# Patient Record
Sex: MALE | Race: WHITE | Employment: FULL TIME | URBAN - METROPOLITAN AREA
[De-identification: names, ages, dates, MRNs, and addresses within clinical notes are randomized per-mention and may not be internally consistent; named-entity substitution may affect disease eponyms.]

---

## 2017-07-20 ENCOUNTER — RECORDS - HEALTHEAST (OUTPATIENT)
Dept: LAB | Facility: CLINIC | Age: 57
End: 2017-07-20

## 2017-07-20 LAB
CHOLEST SERPL-MCNC: 205 MG/DL
FASTING STATUS PATIENT QL REPORTED: ABNORMAL
HDLC SERPL-MCNC: 45 MG/DL
LDLC SERPL CALC-MCNC: 147 MG/DL
PSA SERPL-MCNC: 0.8 NG/ML (ref 0–3.5)
TRIGL SERPL-MCNC: 64 MG/DL

## 2018-01-30 ENCOUNTER — OFFICE VISIT (OUTPATIENT)
Dept: FAMILY MEDICINE | Facility: CLINIC | Age: 58
End: 2018-01-30
Payer: COMMERCIAL

## 2018-01-30 VITALS
OXYGEN SATURATION: 98 % | SYSTOLIC BLOOD PRESSURE: 138 MMHG | HEIGHT: 72 IN | DIASTOLIC BLOOD PRESSURE: 99 MMHG | WEIGHT: 249.5 LBS | BODY MASS INDEX: 33.79 KG/M2 | TEMPERATURE: 98.2 F | HEART RATE: 76 BPM

## 2018-01-30 DIAGNOSIS — J20.9 ACUTE BRONCHITIS WITH SYMPTOMS > 10 DAYS: Primary | ICD-10-CM

## 2018-01-30 PROCEDURE — 99213 OFFICE O/P EST LOW 20 MIN: CPT | Performed by: FAMILY MEDICINE

## 2018-01-30 RX ORDER — CODEINE PHOSPHATE AND GUAIFENESIN 10; 100 MG/5ML; MG/5ML
1 SOLUTION ORAL EVERY 4 HOURS PRN
Qty: 120 ML | Refills: 0 | Status: SHIPPED | OUTPATIENT
Start: 2018-01-30 | End: 2019-09-25

## 2018-01-30 RX ORDER — BENZONATATE 200 MG/1
200 CAPSULE ORAL 3 TIMES DAILY PRN
Qty: 21 CAPSULE | Refills: 0 | Status: SHIPPED | OUTPATIENT
Start: 2018-01-30 | End: 2019-09-25

## 2018-01-30 NOTE — MR AVS SNAPSHOT
"              After Visit Summary   2018    Lance Hogue    MRN: [id number]           Patient Information     Date Of Birth          1960        Visit Information        Provider Department      2018 3:30 PM Aline De Guzman MD [facility]        Today's Diagnoses     Acute bronchitis with symptoms > 10 days    -  1       Follow-ups after your visit        Who to contact     Normal or non-critical lab and imaging results will be communicated to you by MyChart, letter or phone within 4 business days after the clinic has received the results. If you do not hear from us within 7 days, please contact the clinic through MyChart or phone. If you have a critical or abnormal lab result, we will notify you by phone as soon as possible.  Submit refill requests through [employer] or call your pharmacy and they will forward the refill request to us. Please allow 3 business days for your refill to be completed.          If you need to speak with a  for additional information , please call: [phone]             Additional Information About Your Visit        [employer] Information     [employer] lets you send messages to your doctor, view your test results, renew your prescriptions, schedule appointments and more. To sign up, go to [email or web address] . Click on \"Log in\" on the left side of the screen, which will take you to the Welcome page. Then click on \"Sign up Now\" on the right side of the page.     You will be asked to enter the access code listed below, as well as some personal information. Please follow the directions to create your username and password.     Your access code is: [email or web address]  Expires: 2018  4:06 PM     Your access code will  in 90 days. If you need help or a new code, please call your [place] clinic or [phone].        Care EveryWhere ID     This is your Care EveryWhere ID. This could be used by other organizations to access your [facility] " "medical records  [id number]        Your Vitals Were     Pulse Temperature Height Pulse Oximetry BMI (Body Mass Index)       76 98.2  F (36.8  C) (Tympanic) 5' 11.5\" (1.816 m) 98% 34.31 kg/m2        Blood Pressure from Last 3 Encounters:   01/30/18 (!) 138/99    Weight from Last 3 Encounters:   01/30/18 249 lb 8 oz (113.2 kg)              Today, you had the following     No orders found for display         Today's Medication Changes          These changes are accurate as of 1/30/18  4:06 PM.  If you have any questions, ask your nurse or doctor.               Start taking these medicines.        Dose/Directions    benzonatate 200 MG capsule   Commonly known as:  TESSALON   Used for:  Acute bronchitis with symptoms > 10 days   Started by:  Aline De Guzman MD        Dose:  200 mg   Take 1 capsule (200 mg) by mouth 3 times daily as needed for cough   Quantity:  21 capsule   Refills:  0       guaiFENesin-codeine 100-10 MG/5ML Soln solution   Commonly known as:  ROBITUSSIN AC   Used for:  Acute bronchitis with symptoms > 10 days   Started by:  Aline De Guzman MD        Dose:  1 tsp.   Take 5 mLs by mouth every 4 hours as needed for cough   Quantity:  120 mL   Refills:  0            Where to get your medicines      These medications were sent to [facility] [address] [address] [facility]  [address] [facility] [address]     Phone:  [phone]     benzonatate 200 MG capsule         Some of these will need a paper prescription and others can be bought over the counter.  Ask your nurse if you have questions.     Bring a paper prescription for each of these medications     guaiFENesin-codeine 100-10 MG/5ML Soln solution                Primary Care Provider    None Specified       No primary provider on file.        Equal Access to Services     JESSICA MATHEWS AH: Joshua Figueroa, ajay wang, erwin kaaldayana cash, loy shirley. So wa " [phone].    ATENCIÓN: Si alex velazquez, tiene a hardin disposición servicios gratuitos de asistencia lingüística. Jarad valera [phone].    We comply with applicable federal civil rights laws and [place] laws. We do not discriminate on the basis of race, color, national origin, age, disability, sex, sexual orientation, or gender identity.            Thank you!     Thank you for choosing [facility]  for your care. Our goal is always to provide you with excellent care. Hearing back from our patients is one way we can continue to improve our services. Please take a few minutes to complete the written survey that you may receive in the mail after your visit with us. Thank you!             Your Updated Medication List - Protect others around you: Learn how to safely use, store and throw away your medicines at www.disposemymeds.org.          This list is accurate as of 1/30/18  4:06 PM.  Always use your most recent med list.                   Brand Name Dispense Instructions for use Diagnosis    ATORVASTATIN CALCIUM PO           benzonatate 200 MG capsule    TESSALON    21 capsule    Take 1 capsule (200 mg) by mouth 3 times daily as needed for cough    Acute bronchitis with symptoms > 10 days       guaiFENesin-codeine 100-10 MG/5ML Soln solution    ROBITUSSIN AC    120 mL    Take 5 mLs by mouth every 4 hours as needed for cough    Acute bronchitis with symptoms > 10 days

## 2018-01-30 NOTE — PROGRESS NOTES
"  SUBJECTIVE:   Lance Hogue is a 57 year old male who presents to clinic today for the following health issues:      ENT Symptoms             Symptoms: cc Present Absent Comment   Fever/Chills   x    Fatigue   x    Muscle Aches   x    Eye Irritation   x    Sneezing   x    Nasal Jett/Drg   x    Sinus Pressure/Pain   x    Loss of smell   x    Dental pain   x    Sore Throat   x    Swollen Glands   x    Ear Pain/Fullness   x    Cough  x     Wheeze   x    Chest Pain  x  Chest congestion    Shortness of breath   x    Rash   x    Other   x      Symptom duration:  past 3 weeks    Symptom severity:  moderate    Treatments tried:  OTC cough medication and cough drops    Contacts:  none     Cough started 3 weeks ago, typical cold symptoms  No wheezing or shortness of breath  Says symptoms are getting better but his wife wanted him to be evaluated.     No cp   No f/c     Review of systems:  No n/v   No d/c   No rash     Problem list and histories reviewed & adjusted, as indicated.  Additional history: as documented    There is no problem list on file for this patient.    Current Outpatient Prescriptions   Medication     ATORVASTATIN CALCIUM PO     No current facility-administered medications for this visit.         Allergies   Allergen Reactions     Chocolate Diarrhea and Cramps     BP (!) 138/99 (BP Location: Right arm, Patient Position: Sitting, Cuff Size: Adult Large)  Pulse 76  Temp 98.2  F (36.8  C) (Tympanic)  Ht 5' 11.5\" (1.816 m)  Wt 249 lb 8 oz (113.2 kg)  SpO2 98%  BMI 34.31 kg/m2   BP Readings from Last 3 Encounters:   01/30/18 (!) 138/99       GENERAL - Pt is alert and oriented in no acute distress.  Affect is appropriate. Good eye contact.  HEET - Head is normocephalic, atraumatic.    PERRLA,EEMI. Conjunctiva are free of icterus or erythema.    TMs bilaterally normal.  Oropharynx free of masses and lesions, no tonsillar exudate or petechiae.    NECK - Neck is supple w/o LA or thyromegaly  RESPIRATORY - " Clear to auscultation bilaterally.  No wheezing noted  CV - RRR, no murmurs, rubs, gallops.     Assessment/Plan -  (J20.9) Acute bronchitis with symptoms > 10 days  (primary encounter diagnosis)  Comment: symptoms are improving overall and vitals and exam are normal. He is comfortable continuing to monitor the cough and will treat symptomatically with prn perles during the day and prn cough syrup at night. The patient indicates understanding of these issues and agrees with the plan. Return to clinic  If symptoms persist/change/worsen.   Plan: benzonatate (TESSALON) 200 MG capsule,         guaiFENesin-codeine (ROBITUSSIN AC) 100-10         MG/5ML SOLN solution            SANIA De Gumzan MD

## 2018-01-30 NOTE — NURSING NOTE
"Chief Complaint   Patient presents with     Cough       Initial BP (!) 138/99 (BP Location: Right arm, Patient Position: Sitting, Cuff Size: Adult Large)  Pulse 76  Temp 98.2  F (36.8  C) (Tympanic)  Ht 5' 11.5\" (1.816 m)  Wt 249 lb 8 oz (113.2 kg)  SpO2 98%  BMI 34.31 kg/m2 Estimated body mass index is 34.31 kg/(m^2) as calculated from the following:    Height as of this encounter: 5' 11.5\" (1.816 m).    Weight as of this encounter: 249 lb 8 oz (113.2 kg).  Medication Reconciliation: complete   Mdady Moreno LPN    "

## 2018-05-17 ENCOUNTER — TELEPHONE (OUTPATIENT)
Dept: OTHER | Facility: CLINIC | Age: 58
End: 2018-05-17

## 2018-05-17 NOTE — TELEPHONE ENCOUNTER
5/17/2018    Call Regarding Onboarding [employer] CHOICES    Attempt 1    Message on voicemail     Comments:           Outreach   AT

## 2018-06-08 NOTE — TELEPHONE ENCOUNTER
06/08/2018    Call Regarding Onboarding [employer] choices     Attempt 2    Message on voicemail    Comments:       Outreach   Nevaeh Renteria

## 2018-08-30 ENCOUNTER — RECORDS - HEALTHEAST (OUTPATIENT)
Dept: LAB | Facility: CLINIC | Age: 58
End: 2018-08-30

## 2018-08-30 LAB
CHOLEST SERPL-MCNC: 190 MG/DL
FASTING STATUS PATIENT QL REPORTED: ABNORMAL
HDLC SERPL-MCNC: 44 MG/DL
LDLC SERPL CALC-MCNC: 133 MG/DL
PSA SERPL-MCNC: 0.8 NG/ML (ref 0–3.5)
TRIGL SERPL-MCNC: 66 MG/DL

## 2019-09-25 ENCOUNTER — OFFICE VISIT (OUTPATIENT)
Dept: FAMILY MEDICINE | Facility: CLINIC | Age: 59
End: 2019-09-25
Payer: COMMERCIAL

## 2019-09-25 VITALS
HEART RATE: 64 BPM | WEIGHT: 243.9 LBS | TEMPERATURE: 97.5 F | BODY MASS INDEX: 33.03 KG/M2 | DIASTOLIC BLOOD PRESSURE: 86 MMHG | SYSTOLIC BLOOD PRESSURE: 136 MMHG | RESPIRATION RATE: 16 BRPM | HEIGHT: 72 IN

## 2019-09-25 DIAGNOSIS — N52.03 COMBINED ARTERIAL INSUFFICIENCY AND CORPORO-VENOUS OCCLUSIVE ERECTILE DYSFUNCTION: ICD-10-CM

## 2019-09-25 DIAGNOSIS — Z11.4 SCREENING FOR HIV (HUMAN IMMUNODEFICIENCY VIRUS): Primary | ICD-10-CM

## 2019-09-25 DIAGNOSIS — Z00.00 ROUTINE GENERAL MEDICAL EXAMINATION AT A HEALTH CARE FACILITY: ICD-10-CM

## 2019-09-25 DIAGNOSIS — Z00.01 ENCOUNTER FOR ROUTINE ADULT MEDICAL EXAM WITH ABNORMAL FINDINGS: ICD-10-CM

## 2019-09-25 LAB
BASOPHILS # BLD AUTO: 0 10E9/L (ref 0–0.2)
BASOPHILS NFR BLD AUTO: 0.3 %
DIFFERENTIAL METHOD BLD: ABNORMAL
EOSINOPHIL # BLD AUTO: 0.1 10E9/L (ref 0–0.7)
EOSINOPHIL NFR BLD AUTO: 1.6 %
ERYTHROCYTE [DISTWIDTH] IN BLOOD BY AUTOMATED COUNT: 12.2 % (ref 10–15)
GLUCOSE SERPL-MCNC: 89 MG/DL (ref 70–99)
HCT VFR BLD AUTO: 44.9 % (ref 40–53)
HGB BLD-MCNC: 15.1 G/DL (ref 13.3–17.7)
LYMPHOCYTES # BLD AUTO: 1.5 10E9/L (ref 0.8–5.3)
LYMPHOCYTES NFR BLD AUTO: 23.6 %
MCH RBC QN AUTO: 33.5 PG (ref 26.5–33)
MCHC RBC AUTO-ENTMCNC: 33.6 G/DL (ref 31.5–36.5)
MCV RBC AUTO: 100 FL (ref 78–100)
MONOCYTES # BLD AUTO: 0.7 10E9/L (ref 0–1.3)
MONOCYTES NFR BLD AUTO: 10.5 %
NEUTROPHILS # BLD AUTO: 4.1 10E9/L (ref 1.6–8.3)
NEUTROPHILS NFR BLD AUTO: 64 %
PLATELET # BLD AUTO: 162 10E9/L (ref 150–450)
RBC # BLD AUTO: 4.51 10E12/L (ref 4.4–5.9)
WBC # BLD AUTO: 6.4 10E9/L (ref 4–11)

## 2019-09-25 PROCEDURE — 36415 COLL VENOUS BLD VENIPUNCTURE: CPT | Performed by: FAMILY MEDICINE

## 2019-09-25 PROCEDURE — 85025 COMPLETE CBC W/AUTO DIFF WBC: CPT | Performed by: FAMILY MEDICINE

## 2019-09-25 PROCEDURE — 99396 PREV VISIT EST AGE 40-64: CPT | Performed by: FAMILY MEDICINE

## 2019-09-25 PROCEDURE — 82947 ASSAY GLUCOSE BLOOD QUANT: CPT | Performed by: FAMILY MEDICINE

## 2019-09-25 RX ORDER — ATORVASTATIN CALCIUM 40 MG/1
40 TABLET, FILM COATED ORAL DAILY
Qty: 90 TABLET | Refills: 3 | Status: SHIPPED | OUTPATIENT
Start: 2019-09-25 | End: 2020-09-28

## 2019-09-25 RX ORDER — TADALAFIL 5 MG/1
5 TABLET ORAL EVERY 24 HOURS
COMMUNITY
End: 2019-09-25

## 2019-09-25 RX ORDER — TADALAFIL 5 MG/1
5 TABLET ORAL EVERY 24 HOURS
Qty: 30 TABLET | Refills: 3 | Status: SHIPPED | OUTPATIENT
Start: 2019-09-25 | End: 2020-08-18

## 2019-09-25 ASSESSMENT — PAIN SCALES - GENERAL: PAINLEVEL: NO PAIN (0)

## 2019-09-25 ASSESSMENT — MIFFLIN-ST. JEOR: SCORE: 1969.32

## 2019-09-25 NOTE — PROGRESS NOTES
SUBJECTIVE:   CC: Lance Hogue is an 59 year old male who presents for preventive health visit.     Healthy Habits:    Do you get at least three servings of calcium containing foods daily (dairy, green leafy vegetables, etc.)? no, taking calcium and/or vitamin D supplement: no    Amount of exercise or daily activities, outside of work: 2-3 day(s) per week    Problems taking medications regularly No    Medication side effects: No    Have you had an eye exam in the past two years? yes    Do you see a dentist twice per year? yes    Do you have sleep apnea, excessive snoring or daytime drowsiness?no    Wt Readings from Last 10 Encounters:   09/25/19 110.6 kg (243 lb 14.4 oz)   01/30/18 113.2 kg (249 lb 8 oz)     Patient informed that anything we discuss that is not related to preventative medicine, may be billed for; patient verbalizes understanding.      Today's PHQ-2 Score:   PHQ-2 ( 1999 Pfizer) 9/25/2019 1/30/2018   Q1: Little interest or pleasure in doing things 0 0   Q2: Feeling down, depressed or hopeless 0 0   PHQ-2 Score 0 0     Abuse: Current or Past(Physical, Sexual or Emotional)- No  Do you feel safe in your environment? Yes    Social History     Tobacco Use     Smoking status: Never Smoker     Smokeless tobacco: Former User     Types: Snuff   Substance Use Topics     Alcohol use: Yes     If you drink alcohol do you typically have >3 drinks per day or >7 drinks per week? No                      Last PSA: No results found for: PSA    Reviewed orders with patient. Reviewed health maintenance and updated orders accordingly - Yes  Lab work is in process  Labs reviewed in EPIC  BP Readings from Last 3 Encounters:   09/25/19 (!) 139/93   01/30/18 (!) 138/99    Wt Readings from Last 3 Encounters:   09/25/19 110.6 kg (243 lb 14.4 oz)   01/30/18 113.2 kg (249 lb 8 oz)                There is no problem list on file for this patient.    History reviewed. No pertinent surgical history.    Social History      Tobacco Use     Smoking status: Never Smoker     Smokeless tobacco: Former User     Types: Snuff   Substance Use Topics     Alcohol use: Yes     Family History   Problem Relation Age of Onset     Alzheimer Disease Mother      Cancer Mother      Other - See Comments Father         blood disease - myldisplasia     Cancer Paternal Grandmother      Cancer Paternal Grandfather          Current Outpatient Medications   Medication Sig Dispense Refill     ATORVASTATIN CALCIUM PO        tadalafil (CIALIS) 5 MG tablet Take 5 mg by mouth every 24 hours         Reviewed and updated as needed this visit by clinical staff  Tobacco  Allergies  Meds  Med Hx  Surg Hx  Fam Hx  Soc Hx        Reviewed and updated as needed this visit by Provider          ROS:  CONSTITUTIONAL: NEGATIVE for fever, chills, change in weight  INTEGUMENTARY/SKIN: NEGATIVE for worrisome rashes, moles or lesions  EYES: NEGATIVE for vision changes or irritation  ENT: NEGATIVE for ear, mouth and throat problems  RESP: NEGATIVE for significant cough or SOB  CV: NEGATIVE for chest pain, palpitations or peripheral edema  GI: NEGATIVE for nausea, abdominal pain, heartburn, or change in bowel habits   male: negative for dysuria, hematuria, decreased urinary stream, erectile dysfunction, urethral discharge  MUSCULOSKELETAL: NEGATIVE for significant arthralgias or myalgia  NEURO: NEGATIVE for weakness, dizziness or paresthesias  PSYCHIATRIC: NEGATIVE for changes in mood or affect    OBJECTIVE:   BP (!) 139/93 (BP Location: Right arm, Patient Position: Sitting, Cuff Size: Adult Large)   Pulse 64   Temp 97.5  F (36.4  C) (Tympanic)   Resp 16   Ht 1.829 m (6')   Wt 110.6 kg (243 lb 14.4 oz)   BMI 33.08 kg/m    EXAM:  GENERAL: healthy, alert and no distress  NECK: no adenopathy, no asymmetry, masses, or scars and thyroid normal to palpation  RESP: lungs clear to auscultation - no rales, rhonchi or wheezes  CV: regular rate and rhythm, normal S1 S2, no S3  or S4, no murmur, click or rub, no peripheral edema and peripheral pulses strong  ABDOMEN: soft, nontender, no hepatosplenomegaly, no masses and bowel sounds normal  MS: no gross musculoskeletal defects noted, no edema    Diagnostic Test Results:  Labs reviewed in Epic    ASSESSMENT/PLAN:   1. Routine general medical examination at a health care facility    - GLUCOSE  - Lipid panel reflex to direct LDL Fasting; Future    2. Encounter for routine adult medical exam with abnormal findings    (Z00.01) Encounter for routine adult medical exam with abnormal findings  Comment:  Good control.  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  Continue currant medications, continue to monito   Plan: tadalafil (CIALIS) 5 MG tablet        3. Screening for HIV (human immunodeficiency virus)    - HIV Screening; Future    COUNSELING:  Reviewed preventive health counseling, as reflected in patient instructions    Estimated body mass index is 33.08 kg/m  as calculated from the following:    Height as of this encounter: 1.829 m (6').    Weight as of this encounter: 110.6 kg (243 lb 14.4 oz).    Weight management plan: Discussed healthy diet and exercise guidelines     reports that he has never smoked. He has quit using smokeless tobacco.  His smokeless tobacco use included snuff.        Counseling Resources:  ATP IV Guidelines  Pooled Cohorts Equation Calculator  FRAX Risk Assessment  ICSI Preventive Guidelines  Dietary Guidelines for Americans, 2010  USDA's MyPlate  ASA Prophylaxis  Lung CA Screening    Brian Perez MD  [facility]3]

## 2019-09-26 ENCOUNTER — TELEPHONE (OUTPATIENT)
Dept: FAMILY MEDICINE | Facility: CLINIC | Age: 59
End: 2019-09-26

## 2019-09-26 NOTE — TELEPHONE ENCOUNTER
Prior Authorization Retail Medication Request    Medication/Dose: Tadalafil  ICD code (if different than what is on RX):  Combined arterial insufficiency and corporo-venous occlusive erectile dysfunction [N52.03]  Previously Tried and Failed:  na  Rationale:      Insurance Name:  [employer]  Insurance ID:  [id number]      Pharmacy Information (if different than what is on RX)  [facility]  Phone:  [phone]

## 2019-10-01 NOTE — TELEPHONE ENCOUNTER
Central Prior Authorization Team   Phone: [phone]    PA Initiation    Medication: Tadalafil  Insurance Company: [employer] - Phone [phone] Fax [phone]  Pharmacy Filling the Rx: [id number] - [place], [place] - [address] [place] [address] E AT [facility] [address] [facility]  Filling Pharmacy Phone: [phone]  Filling Pharmacy Fax:    Start Date: 10/1/2019    Tracking Number is [id number]

## 2019-10-08 NOTE — TELEPHONE ENCOUNTER
Central Prior Authorization Team   Phone: [phone]    PRIOR AUTHORIZATION DENIED    Medication: Tadalafil    Denial Date: 10/4/2019    Denial Rational: This member's group does not have coverage for erectile dysfunction medications. Prior authorization is not possible. The patient may certainly get the medication, but would be responsible for 100% of the cost.    Appeal Information: N/A

## 2020-08-13 DIAGNOSIS — N52.03 COMBINED ARTERIAL INSUFFICIENCY AND CORPORO-VENOUS OCCLUSIVE ERECTILE DYSFUNCTION: ICD-10-CM

## 2020-08-18 RX ORDER — TADALAFIL 5 MG/1
5 TABLET ORAL EVERY 24 HOURS
Qty: 30 TABLET | Refills: 0 | Status: SHIPPED | OUTPATIENT
Start: 2020-08-18 | End: 2020-09-28

## 2020-09-28 ENCOUNTER — OFFICE VISIT (OUTPATIENT)
Dept: FAMILY MEDICINE | Facility: CLINIC | Age: 60
End: 2020-09-28
Payer: COMMERCIAL

## 2020-09-28 VITALS
BODY MASS INDEX: 32.47 KG/M2 | TEMPERATURE: 98.1 F | DIASTOLIC BLOOD PRESSURE: 95 MMHG | WEIGHT: 239.7 LBS | HEIGHT: 72 IN | HEART RATE: 67 BPM | SYSTOLIC BLOOD PRESSURE: 148 MMHG

## 2020-09-28 DIAGNOSIS — Z12.5 SCREENING FOR PROSTATE CANCER: Primary | ICD-10-CM

## 2020-09-28 DIAGNOSIS — Z11.59 NEED FOR HEPATITIS C SCREENING TEST: ICD-10-CM

## 2020-09-28 DIAGNOSIS — Z00.00 ROUTINE GENERAL MEDICAL EXAMINATION AT A HEALTH CARE FACILITY: ICD-10-CM

## 2020-09-28 DIAGNOSIS — Z11.4 SCREENING FOR HIV (HUMAN IMMUNODEFICIENCY VIRUS): ICD-10-CM

## 2020-09-28 DIAGNOSIS — Z00.01 ENCOUNTER FOR ROUTINE ADULT MEDICAL EXAM WITH ABNORMAL FINDINGS: ICD-10-CM

## 2020-09-28 DIAGNOSIS — Z13.220 SCREENING FOR HYPERLIPIDEMIA: ICD-10-CM

## 2020-09-28 DIAGNOSIS — N52.03 COMBINED ARTERIAL INSUFFICIENCY AND CORPORO-VENOUS OCCLUSIVE ERECTILE DYSFUNCTION: ICD-10-CM

## 2020-09-28 LAB
CHOLEST SERPL-MCNC: 198 MG/DL
HDLC SERPL-MCNC: 61 MG/DL
LDLC SERPL CALC-MCNC: 116 MG/DL
NONHDLC SERPL-MCNC: 137 MG/DL
PSA SERPL-ACNC: 1.34 UG/L (ref 0–4)
TRIGL SERPL-MCNC: 103 MG/DL

## 2020-09-28 PROCEDURE — 86803 HEPATITIS C AB TEST: CPT | Performed by: FAMILY MEDICINE

## 2020-09-28 PROCEDURE — G0103 PSA SCREENING: HCPCS | Performed by: FAMILY MEDICINE

## 2020-09-28 PROCEDURE — 99396 PREV VISIT EST AGE 40-64: CPT | Performed by: FAMILY MEDICINE

## 2020-09-28 PROCEDURE — 80061 LIPID PANEL: CPT | Performed by: FAMILY MEDICINE

## 2020-09-28 PROCEDURE — 87389 HIV-1 AG W/HIV-1&-2 AB AG IA: CPT | Performed by: FAMILY MEDICINE

## 2020-09-28 PROCEDURE — 36415 COLL VENOUS BLD VENIPUNCTURE: CPT | Performed by: FAMILY MEDICINE

## 2020-09-28 RX ORDER — TADALAFIL 5 MG/1
5 TABLET ORAL EVERY 24 HOURS
Qty: 30 TABLET | Refills: 0 | Status: SHIPPED | OUTPATIENT
Start: 2020-09-28 | End: 2021-01-11

## 2020-09-28 RX ORDER — ATORVASTATIN CALCIUM 40 MG/1
40 TABLET, FILM COATED ORAL DAILY
Qty: 90 TABLET | Refills: 3 | Status: SHIPPED | OUTPATIENT
Start: 2020-09-28 | End: 2021-10-06

## 2020-09-28 ASSESSMENT — MIFFLIN-ST. JEOR: SCORE: 1945.27

## 2020-09-28 ASSESSMENT — PAIN SCALES - GENERAL: PAINLEVEL: MODERATE PAIN (5)

## 2020-09-28 NOTE — PROGRESS NOTES
3  SUBJECTIVE:   CC: Lance Hogue is an 60 year old male who presents for preventive health visit.     Patient has been advised of split billing requirements and indicates understanding: Yes     Healthy Habits:    Do you get at least three servings of calcium containing foods daily (dairy, green leafy vegetables, etc.)? yes    Amount of exercise or daily activities, outside of work: 7 day(s) per week    Problems taking medications regularly No    Medication side effects: No    Have you had an eye exam in the past two years? no    Do you see a dentist twice per year? yes    Do you have sleep apnea, excessive snoring or daytime drowsiness?no    -He I having some stuff going on with his right knee and he is wanting to discuss this with you.    Today's PHQ-2 Score:   PHQ-2 ( 1999 Pfizer) 9/28/2020 9/25/2019   Q1: Little interest or pleasure in doing things 0 0   Q2: Feeling down, depressed or hopeless 0 0   PHQ-2 Score 0 0     Abuse: Current or Past(Physical, Sexual or Emotional)- No  Do you feel safe in your environment? Yes    Have you ever done Advance Care Planning? (For example, a Health Directive, POLST, or a discussion with a medical provider or your loved ones about your wishes): No, advance care planning information given to patient to review.  Patient declined advance care planning discussion at this time.    Social History     Tobacco Use     Smoking status: Never Smoker     Smokeless tobacco: Former User     Types: Snuff   Substance Use Topics     Alcohol use: Yes     If you drink alcohol do you typically have >3 drinks per day or >7 drinks per week? No                      Last PSA: No results found for: PSA    Reviewed orders with patient. Reviewed health maintenance and updated orders accordingly -  There is no problem list on file for this patient.    History reviewed. No pertinent surgical history.    Social History     Tobacco Use     Smoking status: Never Smoker     Smokeless tobacco: Former User      Types: Snuff   Substance Use Topics     Alcohol use: Yes     Family History   Problem Relation Age of Onset     Alzheimer Disease Mother      Cancer Mother      Other - See Comments Father         blood disease - myldisplasia     Cancer Paternal Grandmother      Cancer Paternal Grandfather          Current Outpatient Medications   Medication Sig Dispense Refill     atorvastatin (LIPITOR) 40 MG tablet Take 1 tablet (40 mg) by mouth daily 90 tablet 3     tadalafil (CIALIS) 5 MG tablet Take 1 tablet (5 mg) by mouth every 24 hours 30 tablet 0     Allergies   Allergen Reactions     Chocolate Diarrhea and Cramps       Reviewed and updated as needed this visit by clinical staff  Tobacco  Allergies  Meds  Med Hx  Surg Hx  Fam Hx  Soc Hx      Reviewed and updated as needed this visit by Provider       ROS:  CONSTITUTIONAL: NEGATIVE for fever, chills, change in weight  INTEGUMENTARY/SKIN: NEGATIVE for worrisome rashes, moles or lesions  EYES: NEGATIVE for vision changes or irritation  ENT: NEGATIVE for ear, mouth and throat problems  RESP: NEGATIVE for significant cough or SOB  CV: NEGATIVE for chest pain, palpitations or peripheral edema  GI: NEGATIVE for nausea, abdominal pain, heartburn, or change in bowel habits   male: negative for dysuria, hematuria, decreased urinary stream, erectile dysfunction, urethral discharge  MUSCULOSKELETAL: NEGATIVE for significant arthralgias or myalgia  NEURO: NEGATIVE for weakness, dizziness or paresthesias  PSYCHIATRIC: NEGATIVE for changes in mood or affect    OBJECTIVE:   BP (!) 148/95   Pulse 67   Temp 98.1  F (36.7  C) (Tympanic)   Ht 1.829 m (6')   Wt 108.7 kg (239 lb 11.2 oz)   BMI 32.51 kg/m    EXAM:  GENERAL: healthy, alert and no distress  NECK: no adenopathy, no asymmetry, masses, or scars and thyroid normal to palpation  RESP: lungs clear to auscultation - no rales, rhonchi or wheezes  CV: regular rate and rhythm, normal S1 S2, no S3 or S4, no murmur, click or  rub, no peripheral edema and peripheral pulses strong  ABDOMEN: soft, nontender, no hepatosplenomegaly, no masses and bowel sounds normal  MS: no gross musculoskeletal defects noted, no edema    Diagnostic Test Results:  Labs reviewed in Epic    ASSESSMENT/PLAN:   1. Routine general medical examination at a health care facility      2. Encounter for routine adult medical exam with abnormal findings    - atorvastatin (LIPITOR) 40 MG tablet; Take 1 tablet (40 mg) by mouth daily  Dispense: 90 tablet; Refill: 3    3. Combined arterial insufficiency and corporo-venous occlusive erectile dysfunction  bp recjhec was better buthe is instucted to monitor  - tadalafil (CIALIS) 5 MG tablet; Take 1 tablet (5 mg) by mouth every 24 hours  Dispense: 30 tablet; Refill: 0    4. Need for hepatitis C screening test  - Hepatitis C Screen Reflex to HCV RNA Quant and Genotype    5. Screening for HIV (human immunodeficiency virus)    6. Screening for hyperlipidemia    Patient has been advised of split billing requirements and indicates understanding: Yes  COUNSELING:  Reviewed preventive health counseling, as reflected in patient instructions       Regular exercise       Healthy diet/nutrition       Vision screening       Hearing screening       Colon cancer screening       Prostate cancer screening    Estimated body mass index is 32.51 kg/m  as calculated from the following:    Height as of this encounter: 1.829 m (6').    Weight as of this encounter: 108.7 kg (239 lb 11.2 oz).    Weight management plan: Discussed healthy diet and exercise guidelines    He reports that he has never smoked. He has quit using smokeless tobacco.  His smokeless tobacco use included snuff.      Counseling Resources:  ATP IV Guidelines  Pooled Cohorts Equation Calculator  FRAX Risk Assessment  ICSI Preventive Guidelines  Dietary Guidelines for Americans, 2010  USDA's MyPlate  ASA Prophylaxis  Lung CA Screening    Brian Perez MD  [facility]

## 2020-09-29 LAB
HCV AB SERPL QL IA: NONREACTIVE
HIV 1+2 AB+HIV1 P24 AG SERPL QL IA: NONREACTIVE

## 2020-10-26 ENCOUNTER — TELEPHONE (OUTPATIENT)
Dept: FAMILY MEDICINE | Facility: CLINIC | Age: 60
End: 2020-10-26

## 2020-10-26 DIAGNOSIS — M25.561 ACUTE PAIN OF RIGHT KNEE: Primary | ICD-10-CM

## 2020-10-26 NOTE — TELEPHONE ENCOUNTER
Reason for Call: Request for an order or referral:    Order or referral being requested: Lance was seen on 9/28/20 for yearly PE.  On examination it appeared that he may have a torn meniscus and a referral was suppose to be done.  Referral to orthopedic and spine for screening for prostate cancer but not for his Right knee.  Please review and advise. Thank you..Evi Mccauley    Date needed: as soon as possible    Has the patient been seen by the PCP for this problem? YES    Phone number Patient can be reached at:  Home number on file [phone] (home)    Best Time:  Any time    Can we leave a detailed message on this number?  YES    Call taken on 10/26/2020 at 3:30 PM by Evi Mccauley

## 2020-10-28 ENCOUNTER — ANCILLARY PROCEDURE (OUTPATIENT)
Dept: GENERAL RADIOLOGY | Facility: CLINIC | Age: 60
End: 2020-10-28
Attending: ORTHOPAEDIC SURGERY
Payer: COMMERCIAL

## 2020-10-28 ENCOUNTER — OFFICE VISIT (OUTPATIENT)
Dept: ORTHOPEDICS | Facility: CLINIC | Age: 60
End: 2020-10-28
Attending: FAMILY MEDICINE
Payer: COMMERCIAL

## 2020-10-28 VITALS
BODY MASS INDEX: 31.15 KG/M2 | HEART RATE: 65 BPM | WEIGHT: 230 LBS | SYSTOLIC BLOOD PRESSURE: 165 MMHG | HEIGHT: 72 IN | DIASTOLIC BLOOD PRESSURE: 90 MMHG

## 2020-10-28 DIAGNOSIS — G89.29 CHRONIC PAIN OF RIGHT KNEE: ICD-10-CM

## 2020-10-28 DIAGNOSIS — M25.561 ACUTE PAIN OF RIGHT KNEE: ICD-10-CM

## 2020-10-28 DIAGNOSIS — M17.11 PRIMARY OSTEOARTHRITIS OF RIGHT KNEE: Primary | ICD-10-CM

## 2020-10-28 DIAGNOSIS — M25.561 CHRONIC PAIN OF RIGHT KNEE: ICD-10-CM

## 2020-10-28 PROCEDURE — 73562 X-RAY EXAM OF KNEE 3: CPT | Mod: TC

## 2020-10-28 PROCEDURE — 99203 OFFICE O/P NEW LOW 30 MIN: CPT | Mod: 25 | Performed by: ORTHOPAEDIC SURGERY

## 2020-10-28 PROCEDURE — 20610 DRAIN/INJ JOINT/BURSA W/O US: CPT | Mod: RT | Performed by: ORTHOPAEDIC SURGERY

## 2020-10-28 RX ORDER — BUPIVACAINE HYDROCHLORIDE 2.5 MG/ML
4 INJECTION, SOLUTION INFILTRATION; PERINEURAL
Status: DISCONTINUED | OUTPATIENT
Start: 2020-10-28 | End: 2021-03-04

## 2020-10-28 RX ORDER — METHYLPREDNISOLONE ACETATE 80 MG/ML
80 INJECTION, SUSPENSION INTRA-ARTICULAR; INTRALESIONAL; INTRAMUSCULAR; SOFT TISSUE
Status: DISCONTINUED | OUTPATIENT
Start: 2020-10-28 | End: 2021-03-04

## 2020-10-28 RX ADMIN — BUPIVACAINE HYDROCHLORIDE 4 ML: 2.5 INJECTION, SOLUTION INFILTRATION; PERINEURAL at 14:50

## 2020-10-28 RX ADMIN — METHYLPREDNISOLONE ACETATE 80 MG: 80 INJECTION, SUSPENSION INTRA-ARTICULAR; INTRALESIONAL; INTRAMUSCULAR; SOFT TISSUE at 14:50

## 2020-10-28 ASSESSMENT — MIFFLIN-ST. JEOR: SCORE: 1901.27

## 2020-10-28 ASSESSMENT — PAIN SCALES - GENERAL: PAINLEVEL: SEVERE PAIN (6)

## 2020-10-28 NOTE — PROGRESS NOTES
CHIEF COMPLAINT:   Chief Complaint   Patient presents with     Right Knee - Pain     Onset: about 3 years but has gotten progressively worse. Pain is every day and night, pretty constant. If he bumps it wrong pain can increase. Pain is more medial. Later in the day things get tougher. He wears a brace at work. No tx.    .  Lance Hogue is seen today in the [facility] for evaluation of right knee pain at the request of Dr. Brian Perez            HISTORY OF PRESENT ILLNESS    Lance Hogue is a 60 year old male seen for evaluation of ongoing right knee pain with no known injury.   Pain has been present for 3 years, or so, progressively getting worse, especially the past 6 months. Pain is constant, day and night, gets worse as the day goes on. Aggravated with bumping the knee, catching the foot, prolonged weight bearing activities. Better with rest, elevation, pillow behind the knee. Treatment has been a brace at work, icy hot at night.    denies low back pain, denies numbness and tingling, denies hip pain.    Present symptoms: pain medially  and anteriorly, pain sharp, shooting, dull/achy , moderate pain, mild swelling.    Pain severity: 6/10  Frequency of symptoms: are constant  Exacerbating Factors: weight bearing, stairs down more than up, prolonged standing  Relieving Factors: rest, sitting  Night Pain: Yes  Pain while at rest: No   Numbness or tingling: No   Patient has tried:     NSAIDS: No      Physical Therapy: No      Activity modification: No      Bracing: Yes      Injections: No      Ice: No      Assistive device:  No     Other: icy hot      Other PMH:  has no past medical history on file.  There is no problem list on file for this patient.      Surgical Hx:  has no past surgical history on file.    Medications:   Current Outpatient Medications:      atorvastatin (LIPITOR) 40 MG tablet, Take 1 tablet (40 mg) by mouth daily, Disp: 90 tablet, Rfl: 3     tadalafil  (CIALIS) 5 MG tablet, Take 1 tablet (5 mg) by mouth every 24 hours, Disp: 30 tablet, Rfl: 0    Allergies:   Allergies   Allergen Reactions     Chocolate Diarrhea and Cramps       Social Hx: .   reports that he has never smoked. He has quit using smokeless tobacco.  His smokeless tobacco use included snuff. He reports current alcohol use. He reports that he does not use drugs.    Family Hx: family history includes Alzheimer Disease in his mother; Cancer in his mother, paternal grandfather, and paternal grandmother; Other - See Comments in his father.    REVIEW OF SYSTEMS: 10 point ROS neg other than the symptoms noted above in the HPI and PMH. Notables include  CONSTITUTIONAL:NEGATIVE for fever, chills, change in weight  INTEGUMENTARY/SKIN: NEGATIVE for worrisome rashes, moles or lesions  MUSCULOSKELETAL:See HPI above  NEURO: NEGATIVE for weakness, dizziness or paresthesias    PHYSICAL EXAM:  BP (!) 165/90   Pulse 65   Ht 1.829 m (6')   Wt 104.3 kg (230 lb)   BMI 31.19 kg/m     GENERAL APPEARANCE: healthy, alert, no distress; accompanied by his wife.  SKIN: no suspicious lesions or rashes  NEURO: Normal strength and tone, mentation intact and speech normal  PSYCH:  mentation appears normal and affect normal, not anxious  RESPIRATORY: No increased work of breathing.  HANDS: no clubbing, nail pitting  LYMPH: no palpable popliteal lymphadenopathy.    BILATERAL LOWER EXTREMITIES:  Gait: normal  Alignment: varus  No gross deformities or masses.  No Quad atrophy, strength normal.  Intact sensation deep peroneal nerve, superficial peroneal nerve, med/lat tibial nerve, sural nerve, saphenous nerve  Intact EHL, EDL, TA, FHL, GS, quadriceps hamstrings and hip flexors  Toes warm and well perfused, brisk capillary refill. Palpable 2+ dp pulses.  Bilateral calf soft and nttp or squeeze.  DTRs: achilles 2+, patella 2+.  Edema: trace    LEFT KNEE EXAM:    Skin: intact, no ecchymosis or erythema  Squat: 100 %,  "not limited by pain.     ROM: full extension to 125 flexion  Tight hamstrings on straight leg raise.  Effusion: none  Tender: NTTP med/lat joint line, anterior or posterior knee  McMurrays: negative    MCL: stable, and non-painful at both 0 and 30 degrees knee flexion  Varus stress: stable, and non-painful at both 0 and 30 degrees knee flexion  Lachmans: neg, firm endpoint  Posterior Drawer stable  Patellofemoral joint:                Apprehension: negative              Crepitations: minimal    RIGHT KNEE EXAM:    Skin: intact, no ecchymosis or erythema  Squat: 100 %, not limited by pain.   causes diffuse discomfort.  ROM: full extension to 115 flexion, anterior and medial discomfort.  Tight hamstrings on straight leg raise.  Effusion: trace  Tender: medial joint line, posterior knee   NTTP lateral joint line.  McMurrays: equivocal medially.    MCL: stable, and non-painful at both 0 and 30 degrees knee flexion  Varus stress: stable, and non-painful at both 0 and 30 degrees knee flexion  Lachmans: neg, firm endpoint  Posterior Drawer stable  Patellofemoral joint:                Apprehension: negative              Crepitations: mild   Grind: positive.    X-RAY:  3 views right knee from 10/28/2020 were reviewed in clinic today. On my review, no obvious fractures or dislocations. Moderate medial compartment narrowing with subchondral sclerosis, mild patello-femoral degenerative changes with marginal osteophytes.     ASSESSMENT/PLAN: Lance Hogue is a 60 year old male with chronic right knee pain, primary osteoarthritis.     * reviewed imaging studies with patient, showing arthritic changes, or wearing of the cartilage in the knee. This can be caused by normal \"wear and tear\" over the years or following prior injury to the knee.  * treatment usually nonsurgical to start, then can progress to surgical with total knee arthroplasty once nonsurgical management effective.    Non-surgical treatment for knee arthritis " "includes:    * rest, sitting  * Activity modification - avoid impact activities or activities that aggravate symptoms.  * NSAIDS (non-steroidal anti-inflammatory medications; e.g. Aleve, advil, motrin, ibuprofen) - regular use for inflammation ( twice daily or three times daily), with food, as long as no contra-indications Please discuss with primary care doctor if needed  * ice, 15-20 minutes at a time several times a day or as needed.  * Strengthening of quadriceps muscles  * Physical Therapy for strengthening, stretching and range of motion exercises of legs  * Tylenol as needed for pain, consider Tylenol arthritis or similar  * Weight loss: Weight loss:  Body mass index is 31.19 kg/m .. weight loss benefits, not only for the current pain symptoms, but also overall health. Recommend a good diet plan that works for the patient, with the assistance of a dietician or primary care doctor as needed. Also, a good, low-impact exercise program for at least 20 minutes per day, 3 times per week, such as exercise bike, elliptical , or pool.  * Exercise: low impact such as stationary bike, elliptical, pool.  * Injections: cortisone versus viscosupplementation (hyaluronic acid, \"rooster comb\", \"gel shots\"); risks and perceived benefits discussed today. Patient elects to proceed.  * Bracing: bracing the knee may offer some relief of symptoms when worn and provide some stability.  * over the counter supplements such as glucosamine and chondroitin sulfate may help with joint pain.  * topical ointments may help as well    * return to clinic as needed.      Flash Boggs M.D., M.S.  Dept. of Orthopaedic Surgery  [facility]    Large Joint Injection/Arthocentesis: R knee joint    Date/Time: 10/28/2020 2:50 PM  Performed by: Andrea Alvarez PA  Authorized by: Flash Boggs MD     Indications:  Pain and osteoarthritis  Needle Size:  22 G  Guidance: landmark guided    Approach:  Anteromedial  Location:  " Knee      Medications:  80 mg methylPREDNISolone 80 MG/ML; 4 mL bupivacaine 0.25 %  Outcome:  Tolerated well, no immediate complications  Procedure discussed: discussed risks, benefits, and alternatives    Consent Given by:  Patient  Prep: patient was prepped and draped in usual sterile fashion

## 2020-10-28 NOTE — LETTER
10/28/2020         RE: Lance Hogue  [address]  [place] [address]        Dear Colleague,    Thank you for referring your patient, Lance Hogue, to the [facility] [place]. Please see a copy of my visit note below.    CHIEF COMPLAINT:   Chief Complaint   Patient presents with     Right Knee - Pain     Onset: about 3 years but has gotten progressively worse. Pain is every day and night, pretty constant. If he bumps it wrong pain can increase. Pain is more medial. Later in the day things get tougher. He wears a brace at work. No tx.    .  Lance Hogue is seen today in the [facility] for evaluation of right knee pain at the request of Dr. Brina Perez            HISTORY OF PRESENT ILLNESS    Lance Hogue is a 60 year old male seen for evaluation of ongoing right knee pain with no known injury.   Pain has been present for 3 years, or so, progressively getting worse, especially the past 6 months. Pain is constant, day and night, gets worse as the day goes on. Aggravated with bumping the knee, catching the foot, prolonged weight bearing activities. Better with rest, elevation, pillow behind the knee. Treatment has been a brace at work, icy hot at night.    denies low back pain, denies numbness and tingling, denies hip pain.    Present symptoms: pain medially  and anteriorly, pain sharp, shooting, dull/achy , moderate pain, mild swelling.    Pain severity: 6/10  Frequency of symptoms: are constant  Exacerbating Factors: weight bearing, stairs down more than up, prolonged standing  Relieving Factors: rest, sitting  Night Pain: Yes  Pain while at rest: No   Numbness or tingling: No   Patient has tried:     NSAIDS: No      Physical Therapy: No      Activity modification: No      Bracing: Yes      Injections: No      Ice: No      Assistive device:  No     Other: icy hot      Other PMH:  has no past medical history on file.  There is no  problem list on file for this patient.      Surgical Hx:  has no past surgical history on file.    Medications:   Current Outpatient Medications:      atorvastatin (LIPITOR) 40 MG tablet, Take 1 tablet (40 mg) by mouth daily, Disp: 90 tablet, Rfl: 3     tadalafil (CIALIS) 5 MG tablet, Take 1 tablet (5 mg) by mouth every 24 hours, Disp: 30 tablet, Rfl: 0    Allergies:   Allergies   Allergen Reactions     Chocolate Diarrhea and Cramps       Social Hx: .   reports that he has never smoked. He has quit using smokeless tobacco.  His smokeless tobacco use included snuff. He reports current alcohol use. He reports that he does not use drugs.    Family Hx: family history includes Alzheimer Disease in his mother; Cancer in his mother, paternal grandfather, and paternal grandmother; Other - See Comments in his father.    REVIEW OF SYSTEMS: 10 point ROS neg other than the symptoms noted above in the HPI and PMH. Notables include  CONSTITUTIONAL:NEGATIVE for fever, chills, change in weight  INTEGUMENTARY/SKIN: NEGATIVE for worrisome rashes, moles or lesions  MUSCULOSKELETAL:See HPI above  NEURO: NEGATIVE for weakness, dizziness or paresthesias    PHYSICAL EXAM:  BP (!) 165/90   Pulse 65   Ht 1.829 m (6')   Wt 104.3 kg (230 lb)   BMI 31.19 kg/m     GENERAL APPEARANCE: healthy, alert, no distress; accompanied by his wife.  SKIN: no suspicious lesions or rashes  NEURO: Normal strength and tone, mentation intact and speech normal  PSYCH:  mentation appears normal and affect normal, not anxious  RESPIRATORY: No increased work of breathing.  HANDS: no clubbing, nail pitting  LYMPH: no palpable popliteal lymphadenopathy.    BILATERAL LOWER EXTREMITIES:  Gait: normal  Alignment: varus  No gross deformities or masses.  No Quad atrophy, strength normal.  Intact sensation deep peroneal nerve, superficial peroneal nerve, med/lat tibial nerve, sural nerve, saphenous nerve  Intact EHL, EDL, TA, FHL, GS, quadriceps  hamstrings and hip flexors  Toes warm and well perfused, brisk capillary refill. Palpable 2+ dp pulses.  Bilateral calf soft and nttp or squeeze.  DTRs: achilles 2+, patella 2+.  Edema: trace    LEFT KNEE EXAM:    Skin: intact, no ecchymosis or erythema  Squat: 100 %, not limited by pain.     ROM: full extension to 125 flexion  Tight hamstrings on straight leg raise.  Effusion: none  Tender: NTTP med/lat joint line, anterior or posterior knee  McMurrays: negative    MCL: stable, and non-painful at both 0 and 30 degrees knee flexion  Varus stress: stable, and non-painful at both 0 and 30 degrees knee flexion  Lachmans: neg, firm endpoint  Posterior Drawer stable  Patellofemoral joint:                Apprehension: negative              Crepitations: minimal    RIGHT KNEE EXAM:    Skin: intact, no ecchymosis or erythema  Squat: 100 %, not limited by pain.   causes diffuse discomfort.  ROM: full extension to 115 flexion, anterior and medial discomfort.  Tight hamstrings on straight leg raise.  Effusion: trace  Tender: medial joint line, posterior knee   NTTP lateral joint line.  McMurrays: equivocal medially.    MCL: stable, and non-painful at both 0 and 30 degrees knee flexion  Varus stress: stable, and non-painful at both 0 and 30 degrees knee flexion  Lachmans: neg, firm endpoint  Posterior Drawer stable  Patellofemoral joint:                Apprehension: negative              Crepitations: mild   Grind: positive.    X-RAY:  3 views right knee from 10/28/2020 were reviewed in clinic today. On my review, no obvious fractures or dislocations. Moderate medial compartment narrowing with subchondral sclerosis, mild patello-femoral degenerative changes with marginal osteophytes.     ASSESSMENT/PLAN: Lance Hogue is a 60 year old male with chronic right knee pain, primary osteoarthritis.     * reviewed imaging studies with patient, showing arthritic changes, or wearing of the cartilage in the knee. This can be caused  "by normal \"wear and tear\" over the years or following prior injury to the knee.  * treatment usually nonsurgical to start, then can progress to surgical with total knee arthroplasty once nonsurgical management effective.    Non-surgical treatment for knee arthritis includes:    * rest, sitting  * Activity modification - avoid impact activities or activities that aggravate symptoms.  * NSAIDS (non-steroidal anti-inflammatory medications; e.g. Aleve, advil, motrin, ibuprofen) - regular use for inflammation ( twice daily or three times daily), with food, as long as no contra-indications Please discuss with primary care doctor if needed  * ice, 15-20 minutes at a time several times a day or as needed.  * Strengthening of quadriceps muscles  * Physical Therapy for strengthening, stretching and range of motion exercises of legs  * Tylenol as needed for pain, consider Tylenol arthritis or similar  * Weight loss: Weight loss:  Body mass index is 31.19 kg/m .. weight loss benefits, not only for the current pain symptoms, but also overall health. Recommend a good diet plan that works for the patient, with the assistance of a dietician or primary care doctor as needed. Also, a good, low-impact exercise program for at least 20 minutes per day, 3 times per week, such as exercise bike, elliptical , or pool.  * Exercise: low impact such as stationary bike, elliptical, pool.  * Injections: cortisone versus viscosupplementation (hyaluronic acid, \"rooster comb\", \"gel shots\"); risks and perceived benefits discussed today. Patient elects to proceed.  * Bracing: bracing the knee may offer some relief of symptoms when worn and provide some stability.  * over the counter supplements such as glucosamine and chondroitin sulfate may help with joint pain.  * topical ointments may help as well    * return to clinic as needed.      Flash Boggs M.D., M.S.  Dept. of Orthopaedic Surgery  [facility]    Large Joint " Injection/Arthocentesis: R knee joint    Date/Time: 10/28/2020 2:50 PM  Performed by: Andrea Alvarez PA  Authorized by: Flash Boggs MD     Indications:  Pain and osteoarthritis  Needle Size:  22 G  Guidance: landmark guided    Approach:  Anteromedial  Location:  Knee      Medications:  80 mg methylPREDNISolone 80 MG/ML; 4 mL bupivacaine 0.25 %  Outcome:  Tolerated well, no immediate complications  Procedure discussed: discussed risks, benefits, and alternatives    Consent Given by:  Patient  Prep: patient was prepped and draped in usual sterile fashion              Again, thank you for allowing me to participate in the care of your patient.        Sincerely,        Flash Boggs MD

## 2021-01-11 ENCOUNTER — THERAPY VISIT (OUTPATIENT)
Dept: PHYSICAL THERAPY | Facility: CLINIC | Age: 61
End: 2021-01-11
Attending: PHYSICIAN ASSISTANT
Payer: COMMERCIAL

## 2021-01-11 DIAGNOSIS — M17.11 PRIMARY OSTEOARTHRITIS OF RIGHT KNEE: ICD-10-CM

## 2021-01-11 DIAGNOSIS — G89.29 CHRONIC PAIN OF RIGHT KNEE: ICD-10-CM

## 2021-01-11 DIAGNOSIS — N52.03 COMBINED ARTERIAL INSUFFICIENCY AND CORPORO-VENOUS OCCLUSIVE ERECTILE DYSFUNCTION: ICD-10-CM

## 2021-01-11 DIAGNOSIS — M25.561 CHRONIC PAIN OF RIGHT KNEE: ICD-10-CM

## 2021-01-11 PROCEDURE — 97161 PT EVAL LOW COMPLEX 20 MIN: CPT | Mod: GP | Performed by: PHYSICAL THERAPIST

## 2021-01-11 PROCEDURE — 97110 THERAPEUTIC EXERCISES: CPT | Mod: GP | Performed by: PHYSICAL THERAPIST

## 2021-01-11 RX ORDER — TADALAFIL 5 MG/1
TABLET ORAL
Qty: 30 TABLET | Refills: 0 | Status: SHIPPED | OUTPATIENT
Start: 2021-01-11 | End: 2021-03-04

## 2021-01-11 ASSESSMENT — ACTIVITIES OF DAILY LIVING (ADL)
SIT WITH YOUR KNEE BENT: ACTIVITY IS NOT DIFFICULT
WEAKNESS: THE SYMPTOM AFFECTS MY ACTIVITY MODERATELY
WALK: ACTIVITY IS SOMEWHAT DIFFICULT
GIVING WAY, BUCKLING OR SHIFTING OF KNEE: THE SYMPTOM AFFECTS MY ACTIVITY MODERATELY
KNEE_ACTIVITY_OF_DAILY_LIVING_SCORE: 62.86
KNEEL ON THE FRONT OF YOUR KNEE: ACTIVITY IS NOT DIFFICULT
STAND: ACTIVITY IS NOT DIFFICULT
HOW_WOULD_YOU_RATE_THE_CURRENT_FUNCTION_OF_YOUR_KNEE_DURING_YOUR_USUAL_DAILY_ACTIVITIES_ON_A_SCALE_FROM_0_TO_100_WITH_100_BEING_YOUR_LEVEL_OF_KNEE_FUNCTION_PRIOR_TO_YOUR_INJURY_AND_0_BEING_THE_INABILITY_TO_PERFORM_ANY_OF_YOUR_USUAL_DAILY_ACTIVITIES?: 50
LIMPING: THE SYMPTOM AFFECTS MY ACTIVITY MODERATELY
GO DOWN STAIRS: ACTIVITY IS FAIRLY DIFFICULT
STIFFNESS: THE SYMPTOM AFFECTS MY ACTIVITY SLIGHTLY
SQUAT: ACTIVITY IS SOMEWHAT DIFFICULT
HOW_WOULD_YOU_RATE_THE_OVERALL_FUNCTION_OF_YOUR_KNEE_DURING_YOUR_USUAL_DAILY_ACTIVITIES?: ABNORMAL
PAIN: THE SYMPTOM AFFECTS MY ACTIVITY MODERATELY
GO UP STAIRS: ACTIVITY IS FAIRLY DIFFICULT
KNEE_ACTIVITY_OF_DAILY_LIVING_SUM: 44
SWELLING: I HAVE THE SYMPTOM BUT IT DOES NOT AFFECT MY ACTIVITY
RAW_SCORE: 44
AS_A_RESULT_OF_YOUR_KNEE_INJURY,_HOW_WOULD_YOU_RATE_YOUR_CURRENT_LEVEL_OF_DAILY_ACTIVITY?: NORMAL
RISE FROM A CHAIR: ACTIVITY IS MINIMALLY DIFFICULT

## 2021-01-11 NOTE — PROGRESS NOTES
Patient seen for one time evaluation and treatment.  Patient is opting to continue care through [facility], and will call clinic as needed for follow up.

## 2021-01-11 NOTE — PROGRESS NOTES
[facility] Initial Evaluation  Subjective:  The history is provided by the patient.   Patient Health History  Lance Hogue being seen for R knee.     Date of Onset: unsure; Referral date 10/28/2020.   Problem occurred: over time   Pain is reported as 5/10 on pain scale.  General health as reported by patient is good.     Red flags:  None as reported by patient.  Medical allergies: none.   Surgeries include:  None.    Current medications:  None.    Current occupation is .   Primary job tasks include:  Driving, lifting/carrying and prolonged standing.                  Therapist Generated HPI Evaluation  Problem details: Original injury was mensicus many years ago. Recalls that he had a lot of medial knee OA and bone on bone here. Had injection on 10/28/2020. Moderate benefit from this. PT order for stretching, strengthening and ROM.     .         Type of problem:  Right knee.    This is a new condition.  Condition occurred with:  Degenerative joint disease.    Patient reports pain:  Medial.  Pain is described as aching and is intermittent.  Pain is worse in the P.M..    Associated symptoms:  Loss of motion/stiffness, loss of strength, buckling/giving out and locking. Symptoms are exacerbated by ascending stairs and descending stairs (ascending hills XC skiing; pivoting at work)  and relieved by rest (IcyHot mildly helps).      Barriers include:  None as reported by patient.                        Objective:    Gait:    Gait Type:  Normal                                                           Knee Evaluation:  ROM:  Arom wnl knee: lacking 4 deg ext on R actively, able to achieve 0 passively; full flexion vs L.  Strength wnl knee: 5/5 knee flx & ext, hip abd & flx; 3+/5 B hip ext.            Ligament Testing:  Normal                Special Tests: Special test for knee: mild positive symptoms with Johnna's.      Palpation:      Right knee tenderness present at:  Medial Joint  Line    Mobility Testing:      Patellofemoral Medial:  Right: hypomobile  Patellofemoral Lateral:  Right: hypomobile  Patellofemoral Superior:  Right: hypomobile  Patellofemoral Inferior:  Right: hypomobile  Functional Testing:  : quad dominant form with squat.                      General Evaluation:          Lower Extremity Flexibility:          Hip Flexors:  Decreased flexibility    Quadriceps:  Decreased flexibility                                                                    ROS    Assessment/Plan:    Patient is a 60 year old male with right side knee complaints.    Patient has the following significant findings with corresponding treatment plan.                Diagnosis 1:  R knee pain - OA with history of meniscus injury    Pain -  manual therapy  Decreased ROM/flexibility - manual therapy, therapeutic exercise and therapeutic activity  Decreased joint mobility - manual therapy, therapeutic exercise and therapeutic activity  Decreased strength - therapeutic exercise and therapeutic activities  Decreased function - therapeutic activities    Therapy Evaluation Codes:   1) History comprised of:   Personal factors that impact the plan of care:      Time since onset of symptoms.    Comorbidity factors that impact the plan of care are:      None.     Medications impacting care: None.  2) Examination of Body Systems comprised of:   Body structures and functions that impact the plan of care:      Hip and Knee.   Activity limitations that impact the plan of care are:      Stairs and Walking.  3) Clinical presentation characteristics are:   Stable/Uncomplicated.  4) Decision-Making    Low complexity using standardized patient assessment instrument and/or measureable assessment of functional outcome.  Cumulative Therapy Evaluation is: Low complexity.    Previous and current functional limitations:  (See Goal Flow Sheet for this information)    Short term and Long term goals: (See Goal Flow Sheet for this  information)     Communication ability:  Patient appears to be able to clearly communicate and understand verbal and written communication and follow directions correctly.  Treatment Explanation - The following has been discussed with the patient:   RX ordered/plan of care  Anticipated outcomes  Possible risks and side effects  This patient would benefit from PT intervention to resume normal activities.   Rehab potential is good.    Frequency:  1 X week, once daily  Duration:  for 1 weeks - follow up as needed. Will DC to [facility] per patient request at this point.    Discharge Plan:  Achieve all LTG.  Independent in home treatment program.  Reach maximal therapeutic benefit.    Please refer to the daily flowsheet for treatment today, total treatment time and time spent performing 1:1 timed codes.

## 2021-01-11 NOTE — TELEPHONE ENCOUNTER
Routing refill request to provider for review/approval because:  Would like Provider to decide.  Thank you.  Enzo Lake RN

## 2021-03-04 ENCOUNTER — OFFICE VISIT (OUTPATIENT)
Dept: ORTHOPEDICS | Facility: CLINIC | Age: 61
End: 2021-03-04
Payer: COMMERCIAL

## 2021-03-04 VITALS
HEIGHT: 72 IN | HEART RATE: 66 BPM | DIASTOLIC BLOOD PRESSURE: 94 MMHG | BODY MASS INDEX: 32.66 KG/M2 | WEIGHT: 241.1 LBS | SYSTOLIC BLOOD PRESSURE: 163 MMHG

## 2021-03-04 DIAGNOSIS — M25.561 CHRONIC PAIN OF RIGHT KNEE: ICD-10-CM

## 2021-03-04 DIAGNOSIS — M17.11 PRIMARY OSTEOARTHRITIS OF RIGHT KNEE: Primary | ICD-10-CM

## 2021-03-04 DIAGNOSIS — G89.29 CHRONIC PAIN OF RIGHT KNEE: ICD-10-CM

## 2021-03-04 PROCEDURE — 20610 DRAIN/INJ JOINT/BURSA W/O US: CPT | Mod: RT | Performed by: ORTHOPAEDIC SURGERY

## 2021-03-04 RX ORDER — BUPIVACAINE HYDROCHLORIDE 2.5 MG/ML
4 INJECTION, SOLUTION INFILTRATION; PERINEURAL
Status: DISCONTINUED | OUTPATIENT
Start: 2021-03-04 | End: 2022-08-17

## 2021-03-04 RX ORDER — METHYLPREDNISOLONE ACETATE 80 MG/ML
80 INJECTION, SUSPENSION INTRA-ARTICULAR; INTRALESIONAL; INTRAMUSCULAR; SOFT TISSUE
Status: DISCONTINUED | OUTPATIENT
Start: 2021-03-04 | End: 2022-08-17

## 2021-03-04 RX ADMIN — BUPIVACAINE HYDROCHLORIDE 4 ML: 2.5 INJECTION, SOLUTION INFILTRATION; PERINEURAL at 16:11

## 2021-03-04 RX ADMIN — METHYLPREDNISOLONE ACETATE 80 MG: 80 INJECTION, SUSPENSION INTRA-ARTICULAR; INTRALESIONAL; INTRAMUSCULAR; SOFT TISSUE at 16:11

## 2021-03-04 ASSESSMENT — PAIN SCALES - GENERAL: PAINLEVEL: SEVERE PAIN (7)

## 2021-03-04 ASSESSMENT — MIFFLIN-ST. JEOR: SCORE: 1951.62

## 2021-03-04 NOTE — PROGRESS NOTES
"CHIEF COMPLAINT:   Chief Complaint   Patient presents with     Right Knee - Pain     Last injection: 10/28/20. Injection worked good up until a couple weeks ago. Now he can feel it pop and grind. He would like another injection today.   .      HISTORY OF PRESENT ILLNESS    Lance Hogue is a 60 year old male seen for followup evaluation of ongoing right knee pain with no known injury. Last seen for injections 10/28/2020. Returns today stating the injection worked well up until a couple of weeks ago. Now he can feel the knee \"pop\" and \"grind\" again. He'd like another injection today.     Pain has been present for 3.5 years, or so, progressively getting worse. Pain is constant, day and night, gets worse as the day goes on. Aggravated with bumping the knee, catching the foot, prolonged weight bearing activities. Better with rest, elevation, pillow behind the knee. Treatment has been a brace at work, icy hot at night.    denies low back pain, denies numbness and tingling, denies hip pain.    Present symptoms: pain medially  and anteriorly, pain sharp, shooting, dull/achy , moderate pain, mild swelling.    Pain severity: 7/10  Frequency of symptoms: are constant  Exacerbating Factors: weight bearing, stairs down more than up, prolonged standing  Relieving Factors: rest, sitting  Night Pain: Yes  Pain while at rest: No   Numbness or tingling: No   Patient has tried:     NSAIDS: No      Physical Therapy: Yes      Activity modification: No      Bracing: Yes      Injections: Yes 10/2020      Ice: No      Assistive device:  No     Other: icy hot      Other PMH:  has no past medical history on file.  Patient Active Problem List   Diagnosis     Chronic pain of right knee     Primary osteoarthritis of right knee       Surgical Hx:  has no past surgical history on file.    Medications:   Current Outpatient Medications:      atorvastatin (LIPITOR) 40 MG tablet, Take 1 tablet (40 mg) by mouth daily, Disp: 90 tablet, Rfl: 3     " tadalafil (CIALIS) 5 MG tablet, TAKE ONE TABLET BY MOUTH EVERY 24 HOURS , Disp: 30 tablet, Rfl: 0    Current Facility-Administered Medications:      bupivacaine (MARCAINE) 0.25 % injection 4 mL, 4 mL, , , Flash Boggs MD, 4 mL at 10/28/20 1450     methylPREDNISolone (DEPO-MEDROL) injection 80 mg, 80 mg, , , Flash Boggs MD, 80 mg at 10/28/20 1450    Allergies:   Allergies   Allergen Reactions     Chocolate Diarrhea and Cramps       Social Hx: .   reports that he has never smoked. He has quit using smokeless tobacco.  His smokeless tobacco use included snuff. He reports current alcohol use. He reports that he does not use drugs.    Family Hx: family history includes Alzheimer Disease in his mother; Cancer in his mother, paternal grandfather, and paternal grandmother; Other - See Comments in his father.    REVIEW OF SYSTEMS:   CONSTITUTIONAL:NEGATIVE for fever, chills, change in weight  INTEGUMENTARY/SKIN: NEGATIVE for worrisome rashes, moles or lesions  MUSCULOSKELETAL:See HPI above  NEURO: NEGATIVE for weakness, dizziness or paresthesias    PHYSICAL EXAM:  BP (!) 163/94   Pulse 66   Ht 1.829 m (6')   Wt 109.4 kg (241 lb 1.6 oz)   BMI 32.70 kg/m     GENERAL APPEARANCE: healthy, alert, no distress; accompanied by his wife.  SKIN: no suspicious lesions or rashes  NEURO: Normal strength and tone, mentation intact and speech normal  PSYCH:  mentation appears normal and affect normal, not anxious  RESPIRATORY: No increased work of breathing.      BILATERAL LOWER EXTREMITIES:  Gait: normal  Alignment: varus  No gross deformities or masses.  No Quad atrophy, strength normal.  Intact sensation deep peroneal nerve, superficial peroneal nerve, med/lat tibial nerve, sural nerve, saphenous nerve  Intact EHL, EDL, TA, FHL, GS, quadriceps hamstrings and hip flexors  Toes warm and well perfused, brisk capillary refill. Palpable 2+ dp pulses.  Bilateral calf soft and nttp or squeeze.  Edema:  "trace    LEFT KNEE EXAM:    Skin: intact, no ecchymosis or erythema  Squat: 100 %, not limited by pain.     ROM: full extension to 125 flexion  Tight hamstrings on straight leg raise.  Effusion: none  Tender: NTTP med/lat joint line, anterior or posterior knee  McMurrays: negative    MCL: stable, and non-painful at both 0 and 30 degrees knee flexion  Varus stress: stable, and non-painful at both 0 and 30 degrees knee flexion  Lachmans: neg, firm endpoint  Posterior Drawer stable  Patellofemoral joint:                Apprehension: negative              Crepitations: minimal    RIGHT KNEE EXAM:    Skin: intact, no ecchymosis or erythema  Squat: 100 %, not limited by pain.   causes diffuse discomfort.  ROM: full extension to 115 flexion, anterior and medial discomfort. Medial crepitus with range of motion.  Tight hamstrings on straight leg raise.  Effusion: trace  Tender: medial joint line, posterior knee  NTTP lateral joint line.  McMurrays: positive medially.    MCL: stable, and non-painful at both 0 and 30 degrees knee flexion  Varus stress: stable, and non-painful at both 0 and 30 degrees knee flexion  Lachmans: neg, firm endpoint  Posterior Drawer stable  Patellofemoral joint:                Apprehension: negative              Crepitations: mild   Grind: positive.    X-RAY: no new images today. 3 views right knee from 10/28/2020 - no obvious fractures or dislocations. Moderate medial compartment narrowing with subchondral sclerosis, mild patello-femoral degenerative changes with marginal osteophytes.       ASSESSMENT/PLAN: Lance Hogue is a 60 year old male with chronic right knee pain, primary osteoarthritis.     * reviewed imaging studies with patient, showing arthritic changes, or wearing of the cartilage in the knee. This can be caused by normal \"wear and tear\" over the years or following prior injury to the knee.  * treatment usually nonsurgical to start, then can progress to surgical with total knee " "arthroplasty once nonsurgical management effective.  * suspect degenerative medial meniscus tear.    Non-surgical treatment for knee arthritis includes:    * rest, sitting  * Activity modification - avoid impact activities or activities that aggravate symptoms.  * NSAIDS (non-steroidal anti-inflammatory medications; e.g. Aleve, advil, motrin, ibuprofen) - regular use for inflammation ( twice daily or three times daily), with food, as long as no contra-indications Please discuss with primary care doctor if needed  * ice, 15-20 minutes at a time several times a day or as needed.  * Strengthening of quadriceps muscles  * Physical Therapy for strengthening, stretching and range of motion exercises of legs  * Tylenol as needed for pain, consider Tylenol arthritis or similar  * Weight loss: Weight loss:  Body mass index is 32.7 kg/m .. weight loss benefits, not only for the current pain symptoms, but also overall health. Recommend a good diet plan that works for the patient, with the assistance of a dietician or primary care doctor as needed. Also, a good, low-impact exercise program for at least 20 minutes per day, 3 times per week, such as exercise bike, elliptical , or pool.  * Exercise: low impact such as stationary bike, elliptical, pool.  * Injections: cortisone versus viscosupplementation (hyaluronic acid, \"rooster comb\", \"gel shots\"); risks and perceived benefits discussed today. Patient elects to proceed.  * Bracing: bracing the knee may offer some relief of symptoms when worn and provide some stability.  * over the counter supplements such as glucosamine and chondroitin sulfate may help with joint pain.  * topical ointments may help as well    * return to clinic as needed.      Flash Boggs M.D., M.S.  Dept. of Orthopaedic Surgery  [facility]    Large Joint Injection/Arthocentesis: R knee joint    Date/Time: 3/4/2021 4:11 PM  Performed by: Andrea Alvarez PA  Authorized by: [place], " Flash Pride MD     Indications:  Pain and osteoarthritis  Needle Size:  22 G  Guidance: landmark guided    Approach:  Anteromedial  Location:  Knee      Medications:  80 mg methylPREDNISolone 80 MG/ML; 4 mL bupivacaine 0.25 %  Outcome:  Tolerated well, no immediate complications  Procedure discussed: discussed risks, benefits, and alternatives    Consent Given by:  Patient  Prep: patient was prepped and draped in usual sterile fashion

## 2021-03-04 NOTE — LETTER
"    3/4/2021         RE: Lance Hogue  [id number]  [place] [address]        Dear Colleague,    Thank you for referring your patient, Lance Hogue, to the [facility] [place]. Please see a copy of my visit note below.    CHIEF COMPLAINT:   Chief Complaint   Patient presents with     Right Knee - Pain     Last injection: 10/28/20. Injection worked good up until a couple weeks ago. Now he can feel it pop and grind. He would like another injection today.   .      HISTORY OF PRESENT ILLNESS    Lance Hogue is a 60 year old male seen for followup evaluation of ongoing right knee pain with no known injury. Last seen for injections 10/28/2020. Returns today stating the injection worked well up until a couple of weeks ago. Now he can feel the knee \"pop\" and \"grind\" again. He'd like another injection today.     Pain has been present for 3.5 years, or so, progressively getting worse. Pain is constant, day and night, gets worse as the day goes on. Aggravated with bumping the knee, catching the foot, prolonged weight bearing activities. Better with rest, elevation, pillow behind the knee. Treatment has been a brace at work, icy hot at night.    denies low back pain, denies numbness and tingling, denies hip pain.    Present symptoms: pain medially  and anteriorly, pain sharp, shooting, dull/achy , moderate pain, mild swelling.    Pain severity: 7/10  Frequency of symptoms: are constant  Exacerbating Factors: weight bearing, stairs down more than up, prolonged standing  Relieving Factors: rest, sitting  Night Pain: Yes  Pain while at rest: No   Numbness or tingling: No   Patient has tried:     NSAIDS: No      Physical Therapy: Yes      Activity modification: No      Bracing: Yes      Injections: Yes 10/2020      Ice: No      Assistive device:  No     Other: icy hot      Other PMH:  has no past medical history on file.  Patient Active Problem List   Diagnosis     Chronic pain of right " knee     Primary osteoarthritis of right knee       Surgical Hx:  has no past surgical history on file.    Medications:   Current Outpatient Medications:      atorvastatin (LIPITOR) 40 MG tablet, Take 1 tablet (40 mg) by mouth daily, Disp: 90 tablet, Rfl: 3     tadalafil (CIALIS) 5 MG tablet, TAKE ONE TABLET BY MOUTH EVERY 24 HOURS , Disp: 30 tablet, Rfl: 0    Current Facility-Administered Medications:      bupivacaine (MARCAINE) 0.25 % injection 4 mL, 4 mL, , , Flash Boggs MD, 4 mL at 10/28/20 1450     methylPREDNISolone (DEPO-MEDROL) injection 80 mg, 80 mg, , , Flash Boggs MD, 80 mg at 10/28/20 1450    Allergies:   Allergies   Allergen Reactions     Chocolate Diarrhea and Cramps       Social Hx: .   reports that he has never smoked. He has quit using smokeless tobacco.  His smokeless tobacco use included snuff. He reports current alcohol use. He reports that he does not use drugs.    Family Hx: family history includes Alzheimer Disease in his mother; Cancer in his mother, paternal grandfather, and paternal grandmother; Other - See Comments in his father.    REVIEW OF SYSTEMS:   CONSTITUTIONAL:NEGATIVE for fever, chills, change in weight  INTEGUMENTARY/SKIN: NEGATIVE for worrisome rashes, moles or lesions  MUSCULOSKELETAL:See HPI above  NEURO: NEGATIVE for weakness, dizziness or paresthesias    PHYSICAL EXAM:  BP (!) 163/94   Pulse 66   Ht 1.829 m (6')   Wt 109.4 kg (241 lb 1.6 oz)   BMI 32.70 kg/m     GENERAL APPEARANCE: healthy, alert, no distress; accompanied by his wife.  SKIN: no suspicious lesions or rashes  NEURO: Normal strength and tone, mentation intact and speech normal  PSYCH:  mentation appears normal and affect normal, not anxious  RESPIRATORY: No increased work of breathing.      BILATERAL LOWER EXTREMITIES:  Gait: normal  Alignment: varus  No gross deformities or masses.  No Quad atrophy, strength normal.  Intact sensation deep peroneal nerve, superficial peroneal  nerve, med/lat tibial nerve, sural nerve, saphenous nerve  Intact EHL, EDL, TA, FHL, GS, quadriceps hamstrings and hip flexors  Toes warm and well perfused, brisk capillary refill. Palpable 2+ dp pulses.  Bilateral calf soft and nttp or squeeze.  Edema: trace    LEFT KNEE EXAM:    Skin: intact, no ecchymosis or erythema  Squat: 100 %, not limited by pain.     ROM: full extension to 125 flexion  Tight hamstrings on straight leg raise.  Effusion: none  Tender: NTTP med/lat joint line, anterior or posterior knee  McMurrays: negative    MCL: stable, and non-painful at both 0 and 30 degrees knee flexion  Varus stress: stable, and non-painful at both 0 and 30 degrees knee flexion  Lachmans: neg, firm endpoint  Posterior Drawer stable  Patellofemoral joint:                Apprehension: negative              Crepitations: minimal    RIGHT KNEE EXAM:    Skin: intact, no ecchymosis or erythema  Squat: 100 %, not limited by pain.   causes diffuse discomfort.  ROM: full extension to 115 flexion, anterior and medial discomfort. Medial crepitus with range of motion.  Tight hamstrings on straight leg raise.  Effusion: trace  Tender: medial joint line, posterior knee  NTTP lateral joint line.  McMurrays: positive medially.    MCL: stable, and non-painful at both 0 and 30 degrees knee flexion  Varus stress: stable, and non-painful at both 0 and 30 degrees knee flexion  Lachmans: neg, firm endpoint  Posterior Drawer stable  Patellofemoral joint:                Apprehension: negative              Crepitations: mild   Grind: positive.    X-RAY: no new images today. 3 views right knee from 10/28/2020 - no obvious fractures or dislocations. Moderate medial compartment narrowing with subchondral sclerosis, mild patello-femoral degenerative changes with marginal osteophytes.       ASSESSMENT/PLAN: Lance Hogue is a 60 year old male with chronic right knee pain, primary osteoarthritis.     * reviewed imaging studies with patient,  "showing arthritic changes, or wearing of the cartilage in the knee. This can be caused by normal \"wear and tear\" over the years or following prior injury to the knee.  * treatment usually nonsurgical to start, then can progress to surgical with total knee arthroplasty once nonsurgical management effective.  * suspect degenerative medial meniscus tear.    Non-surgical treatment for knee arthritis includes:    * rest, sitting  * Activity modification - avoid impact activities or activities that aggravate symptoms.  * NSAIDS (non-steroidal anti-inflammatory medications; e.g. Aleve, advil, motrin, ibuprofen) - regular use for inflammation ( twice daily or three times daily), with food, as long as no contra-indications Please discuss with primary care doctor if needed  * ice, 15-20 minutes at a time several times a day or as needed.  * Strengthening of quadriceps muscles  * Physical Therapy for strengthening, stretching and range of motion exercises of legs  * Tylenol as needed for pain, consider Tylenol arthritis or similar  * Weight loss: Weight loss:  Body mass index is 32.7 kg/m .. weight loss benefits, not only for the current pain symptoms, but also overall health. Recommend a good diet plan that works for the patient, with the assistance of a dietician or primary care doctor as needed. Also, a good, low-impact exercise program for at least 20 minutes per day, 3 times per week, such as exercise bike, elliptical , or pool.  * Exercise: low impact such as stationary bike, elliptical, pool.  * Injections: cortisone versus viscosupplementation (hyaluronic acid, \"rooster comb\", \"gel shots\"); risks and perceived benefits discussed today. Patient elects to proceed.  * Bracing: bracing the knee may offer some relief of symptoms when worn and provide some stability.  * over the counter supplements such as glucosamine and chondroitin sulfate may help with joint pain.  * topical ointments may help as well    * return " to clinic as needed.      Flash Boggs M.D., M.S.  Dept. of Orthopaedic Surgery  [facility]    Large Joint Injection/Arthocentesis: R knee joint    Date/Time: 3/4/2021 4:11 PM  Performed by: Andrea Alvarez PA  Authorized by: Flash Boggs MD     Indications:  Pain and osteoarthritis  Needle Size:  22 G  Guidance: landmark guided    Approach:  Anteromedial  Location:  Knee      Medications:  80 mg methylPREDNISolone 80 MG/ML; 4 mL bupivacaine 0.25 %  Outcome:  Tolerated well, no immediate complications  Procedure discussed: discussed risks, benefits, and alternatives    Consent Given by:  Patient  Prep: patient was prepped and draped in usual sterile fashion              Again, thank you for allowing me to participate in the care of your patient.        Sincerely,        Flash Boggs MD

## 2021-04-13 ENCOUNTER — OFFICE VISIT (OUTPATIENT)
Dept: FAMILY MEDICINE | Facility: CLINIC | Age: 61
End: 2021-04-13
Payer: COMMERCIAL

## 2021-04-13 VITALS
HEART RATE: 80 BPM | WEIGHT: 235 LBS | DIASTOLIC BLOOD PRESSURE: 76 MMHG | SYSTOLIC BLOOD PRESSURE: 150 MMHG | BODY MASS INDEX: 31.87 KG/M2 | TEMPERATURE: 97.6 F

## 2021-04-13 DIAGNOSIS — K59.09 OTHER CONSTIPATION: Primary | ICD-10-CM

## 2021-04-13 PROCEDURE — 99213 OFFICE O/P EST LOW 20 MIN: CPT | Performed by: NURSE PRACTITIONER

## 2021-04-13 NOTE — NURSING NOTE
Chief Complaint   Patient presents with     Bowel Problems       Initial BP (!) 150/76   Pulse 80   Temp 97.6  F (36.4  C) (Tympanic)   Wt 106.6 kg (235 lb)   BMI 31.87 kg/m   Estimated body mass index is 31.87 kg/m  as calculated from the following:    Height as of 3/4/21: 1.829 m (6').    Weight as of this encounter: 106.6 kg (235 lb).    Patient presents to the clinic using No DME    Health Maintenance that is potentially due pending provider review:  Tetanus     Possibly completing today per provider review.    Is there anyone who you would like to be able to receive your results? No  If yes have patient fill out YVONNE    Daphney Green M.A.

## 2021-04-13 NOTE — PROGRESS NOTES
Assessment & Plan     Other constipation  Increase fiber consumption to 40-50 grams in diet or supplement  Miralax twice daily for 1 week if no improvement in symptoms. I would recommend further imaging for further evaluation.        BMI:   Estimated body mass index is 31.87 kg/m  as calculated from the following:    Height as of 3/4/21: 1.829 m (6').    Weight as of this encounter: 106.6 kg (235 lb).   Weight management plan: Discussed healthy diet and exercise guidelines      Return in about 1 week (around 4/20/2021), or if symptoms worsen or fail to improve, for Follow up.    FRANKIE Gonzalez CNP  M [facility] RADHA Lucas is a 60 year old who presents for the following health issues     HPI     Concern - Bowel concerns   Onset: couple months   Description: Patient states that he has noticed that bowels went from 1-2 daily to 3+ daily with large amount.  Denies pain and also no blood in stools.  Intensity: mild  Progression of Symptoms:  worsening  Accompanying Signs & Symptoms: None   Previous history of similar problem: hx of hernia   Precipitating factors:        Worsened by: na   Alleviating factors:        Improved by: na   Therapies tried and outcome:  none     Patient is here with 2 months of different having different stools, his stools are described have more ribbon or pencil like size, smaller amounts but having to go more often, often feels like he has to go but then only has gas. No changes in eating habits, stress, activity or other aspects of his health. States he has a an inguinal hernia that is the same, it is still reproducible, no pain, goes in and out. Occasionally looser stools but usually normal formed stool. Denies nausea, vomiting, diarrhea, abdominal pain. No straining, or blood in stool.     Review of Systems   Constitutional: Negative.    Respiratory: Negative.    Cardiovascular: Negative.    Gastrointestinal: Negative for abdominal pain, diarrhea,  hematochezia, nausea, rectal pain and vomiting.   Genitourinary: Negative.    All other systems reviewed and are negative.           Objective    There were no vitals taken for this visit.  There is no height or weight on file to calculate BMI.  Physical Exam  Constitutional:       Appearance: Normal appearance.   Cardiovascular:      Rate and Rhythm: Normal rate and regular rhythm.      Heart sounds: Normal heart sounds.   Pulmonary:      Effort: Pulmonary effort is normal.      Breath sounds: Normal breath sounds.   Abdominal:      General: Abdomen is flat. Bowel sounds are normal. There is no distension.      Palpations: Abdomen is soft.      Tenderness: There is no abdominal tenderness. There is no guarding or rebound.      Hernia: A hernia is present.      Comments: Appears to have a diastasis recti superior abdomen and umbilical hernia, both no pain, and are reproducible without pain.    Skin:     General: Skin is warm and dry.   Neurological:      Mental Status: He is alert and oriented to person, place, and time.   Psychiatric:         Mood and Affect: Mood normal.         Behavior: Behavior normal.

## 2021-04-14 ASSESSMENT — ENCOUNTER SYMPTOMS
HEMATOCHEZIA: 0
CONSTITUTIONAL NEGATIVE: 1
DIARRHEA: 0
NAUSEA: 0
RECTAL PAIN: 0
VOMITING: 0
ABDOMINAL PAIN: 0
RESPIRATORY NEGATIVE: 1
CARDIOVASCULAR NEGATIVE: 1

## 2021-04-22 DIAGNOSIS — N52.03 COMBINED ARTERIAL INSUFFICIENCY AND CORPORO-VENOUS OCCLUSIVE ERECTILE DYSFUNCTION: ICD-10-CM

## 2021-04-22 NOTE — TELEPHONE ENCOUNTER
Routing refill request to provider for review/approval because:  Drug not active on patient's medication list    Mandeep Madison RN

## 2021-04-25 RX ORDER — TADALAFIL 5 MG/1
TABLET ORAL
Qty: 30 TABLET | Refills: 0 | Status: SHIPPED | OUTPATIENT
Start: 2021-04-25 | End: 2021-07-05

## 2021-05-10 ENCOUNTER — IMMUNIZATION (OUTPATIENT)
Dept: FAMILY MEDICINE | Facility: CLINIC | Age: 61
End: 2021-05-10
Payer: COMMERCIAL

## 2021-05-10 PROCEDURE — 91301 PR COVID VAC MODERNA 100 MCG/0.5 ML IM: CPT

## 2021-05-10 PROCEDURE — 0011A PR COVID VAC MODERNA 100 MCG/0.5 ML IM: CPT

## 2021-06-07 ENCOUNTER — IMMUNIZATION (OUTPATIENT)
Dept: FAMILY MEDICINE | Facility: CLINIC | Age: 61
End: 2021-06-07
Attending: FAMILY MEDICINE
Payer: COMMERCIAL

## 2021-06-07 PROCEDURE — 91301 PR COVID VAC MODERNA 100 MCG/0.5 ML IM: CPT

## 2021-06-07 PROCEDURE — 0012A PR COVID VAC MODERNA 100 MCG/0.5 ML IM: CPT

## 2021-07-02 DIAGNOSIS — N52.03 COMBINED ARTERIAL INSUFFICIENCY AND CORPORO-VENOUS OCCLUSIVE ERECTILE DYSFUNCTION: ICD-10-CM

## 2021-07-02 NOTE — TELEPHONE ENCOUNTER
Routing refill request to provider for review/approval because:  PCP to determine refill    Mandeep Madison RN

## 2021-07-05 RX ORDER — TADALAFIL 5 MG/1
TABLET ORAL
Qty: 30 TABLET | Refills: 0 | Status: SHIPPED | OUTPATIENT
Start: 2021-07-05 | End: 2021-10-06

## 2021-07-26 ENCOUNTER — OFFICE VISIT (OUTPATIENT)
Dept: ORTHOPEDICS | Facility: CLINIC | Age: 61
End: 2021-07-26
Payer: COMMERCIAL

## 2021-07-26 VITALS
HEIGHT: 72 IN | WEIGHT: 238 LBS | BODY MASS INDEX: 32.23 KG/M2 | DIASTOLIC BLOOD PRESSURE: 92 MMHG | SYSTOLIC BLOOD PRESSURE: 165 MMHG

## 2021-07-26 DIAGNOSIS — G89.29 CHRONIC PAIN OF RIGHT KNEE: ICD-10-CM

## 2021-07-26 DIAGNOSIS — M25.561 CHRONIC PAIN OF RIGHT KNEE: ICD-10-CM

## 2021-07-26 DIAGNOSIS — M17.11 PRIMARY OSTEOARTHRITIS OF RIGHT KNEE: Primary | ICD-10-CM

## 2021-07-26 PROCEDURE — 20610 DRAIN/INJ JOINT/BURSA W/O US: CPT | Mod: RT | Performed by: ORTHOPAEDIC SURGERY

## 2021-07-26 RX ORDER — METHYLPREDNISOLONE ACETATE 80 MG/ML
80 INJECTION, SUSPENSION INTRA-ARTICULAR; INTRALESIONAL; INTRAMUSCULAR; SOFT TISSUE
Status: DISCONTINUED | OUTPATIENT
Start: 2021-07-26 | End: 2022-08-17

## 2021-07-26 RX ORDER — BUPIVACAINE HYDROCHLORIDE 2.5 MG/ML
4 INJECTION, SOLUTION INFILTRATION; PERINEURAL
Status: DISCONTINUED | OUTPATIENT
Start: 2021-07-26 | End: 2022-08-17

## 2021-07-26 RX ADMIN — METHYLPREDNISOLONE ACETATE 80 MG: 80 INJECTION, SUSPENSION INTRA-ARTICULAR; INTRALESIONAL; INTRAMUSCULAR; SOFT TISSUE at 15:56

## 2021-07-26 RX ADMIN — BUPIVACAINE HYDROCHLORIDE 4 ML: 2.5 INJECTION, SOLUTION INFILTRATION; PERINEURAL at 15:56

## 2021-07-26 ASSESSMENT — PAIN SCALES - GENERAL: PAINLEVEL: SEVERE PAIN (7)

## 2021-07-26 ASSESSMENT — MIFFLIN-ST. JEOR: SCORE: 1937.56

## 2021-07-26 NOTE — PROGRESS NOTES
"CHIEF COMPLAINT:   Chief Complaint   Patient presents with     Right Knee - RECHECK     RECHECK     Would like to get another cortisone injection, states he has been going up and down the ladder quite a bit the last few days   .      HISTORY OF PRESENT ILLNESS    Lance Hogue is a 60 year old male seen for followup evaluation of ongoing right knee pain with no known injury. Last seen for injections 3/4/2021. Returns today stating the injection worked well up until a couple of weeks ago. He's been going up and down a lot of ladders the past few days working on plastering his garage.  Now he can feel the knee \"pop\" and \"grind\" again. He'd like another injection today.     Pain has been present for 3.5-4 years, or so, progressively getting worse. Pain is constant, day and night, gets worse as the day goes on. Aggravated with bumping the knee, catching the foot, prolonged weight bearing activities. Better with rest, elevation, pillow behind the knee. Treatment has been a brace at work, icy hot at night.    denies low back pain, denies numbness and tingling, denies hip pain.    Present symptoms: pain medially  and anteriorly, pain sharp, shooting, dull/achy , moderate pain, mild swelling.    Pain severity: 7/10  Frequency of symptoms: are constant  Exacerbating Factors: weight bearing, stairs down more than up, prolonged standing  Relieving Factors: rest, sitting  Night Pain: Yes  Pain while at rest: No   Numbness or tingling: No   Patient has tried:     NSAIDS: No      Physical Therapy: Yes      Activity modification: No      Bracing: Yes      Injections: Yes 3/4/2021, 10/2020      Ice: No      Assistive device:  No     Other: icy hot      Other PMH:  has no past medical history on file.  Patient Active Problem List   Diagnosis     Chronic pain of right knee     Primary osteoarthritis of right knee       Surgical Hx:  has no past surgical history on file.    Medications:   Current Outpatient Medications:      " atorvastatin (LIPITOR) 40 MG tablet, Take 1 tablet (40 mg) by mouth daily, Disp: 90 tablet, Rfl: 3     tadalafil (CIALIS) 5 MG tablet, TAKE ONE TABLET BY MOUTH EVERY 24 HOURS , Disp: 30 tablet, Rfl: 0    Current Facility-Administered Medications:      bupivacaine (MARCAINE) 0.25 % injection 4 mL, 4 mL, , , Flash Boggs MD, 4 mL at 03/04/21 1611     methylPREDNISolone (DEPO-MEDROL) injection 80 mg, 80 mg, , , Flash Boggs MD, 80 mg at 03/04/21 1611    Allergies:   Allergies   Allergen Reactions     Chocolate Diarrhea and Cramps       Social Hx: .   reports that he has never smoked. He has quit using smokeless tobacco.  His smokeless tobacco use included snuff. He reports current alcohol use. He reports that he does not use drugs.    Family Hx: family history includes Alcoholism in his brother; Alzheimer Disease in his mother; Cancer in his mother, paternal grandfather, and paternal grandmother; Other - See Comments in his father.    REVIEW OF SYSTEMS:   CONSTITUTIONAL:NEGATIVE for fever, chills, change in weight  INTEGUMENTARY/SKIN: NEGATIVE for worrisome rashes, moles or lesions  MUSCULOSKELETAL:See HPI above  NEURO: NEGATIVE for weakness, dizziness or paresthesias    PHYSICAL EXAM:  BP (!) 165/92   Ht 1.829 m (6')   Wt 108 kg (238 lb)   BMI 32.28 kg/m     GENERAL APPEARANCE: healthy, alert, no distress; accompanied by his wife.  SKIN: no suspicious lesions or rashes  NEURO: Normal strength and tone, mentation intact and speech normal  PSYCH:  mentation appears normal and affect normal, not anxious  RESPIRATORY: No increased work of breathing.      BILATERAL LOWER EXTREMITIES:  Gait: normal  Alignment: varus  No gross deformities or masses.  No Quad atrophy, strength normal.  Intact sensation deep peroneal nerve, superficial peroneal nerve, med/lat tibial nerve, sural nerve, saphenous nerve  Intact EHL, EDL, TA, FHL, GS, quadriceps hamstrings and hip flexors  Toes warm and well  "perfused, brisk capillary refill. Palpable 2+ dp pulses.  Bilateral calf soft and nttp or squeeze.  Edema: trace    LEFT KNEE EXAM:    Skin: intact, no ecchymosis or erythema  Squat: 100 %, not limited by pain.     ROM: full extension to 125 flexion  Tight hamstrings on straight leg raise.  Effusion: none  Tender: NTTP med/lat joint line, anterior or posterior knee  McMurrays: negative    MCL: stable, and non-painful at both 0 and 30 degrees knee flexion  Varus stress: stable, and non-painful at both 0 and 30 degrees knee flexion  Lachmans: neg, firm endpoint  Posterior Drawer stable  Patellofemoral joint:                Apprehension: negative              Crepitations: minimal    RIGHT KNEE EXAM:    Skin: intact, no ecchymosis or erythema  Squat: 100 %, not limited by pain.   causes diffuse discomfort.  ROM: full extension to 115 flexion, anterior and medial discomfort. Medial crepitus with range of motion.  Tight hamstrings on straight leg raise.  Effusion: trace  Tender: medial joint line, posterior knee  NTTP lateral joint line.  McMurrays: positive medially.    MCL: stable, and non-painful at both 0 and 30 degrees knee flexion  Varus stress: stable, and non-painful at both 0 and 30 degrees knee flexion  Lachmans: neg, firm endpoint  Posterior Drawer stable  Patellofemoral joint:                Apprehension: negative              Crepitations: mild   Grind: positive.    X-RAY: no new images today. 3 views right knee from 10/28/2020 - no obvious fractures or dislocations. Moderate medial compartment narrowing with subchondral sclerosis, mild patello-femoral degenerative changes with marginal osteophytes.       ASSESSMENT/PLAN: Lance Hogue is a 60 year old male with chronic right knee pain, primary osteoarthritis.     * reviewed imaging studies with patient, showing arthritic changes, or wearing of the cartilage in the knee. This can be caused by normal \"wear and tear\" over the years or following prior " "injury to the knee.  * treatment usually nonsurgical to start, then can progress to surgical with total knee arthroplasty once nonsurgical management effective.  * suspect degenerative medial meniscus tear.    Non-surgical treatment for knee arthritis includes:    * rest, sitting  * Activity modification - avoid impact activities or activities that aggravate symptoms.  * NSAIDS (non-steroidal anti-inflammatory medications; e.g. Aleve, advil, motrin, ibuprofen) - regular use for inflammation ( twice daily or three times daily), with food, as long as no contra-indications Please discuss with primary care doctor if needed  * ice, 15-20 minutes at a time several times a day or as needed.  * Strengthening of quadriceps muscles  * Physical Therapy for strengthening, stretching and range of motion exercises of legs  * Tylenol as needed for pain, consider Tylenol arthritis or similar  * Weight loss: Weight loss:  Body mass index is 32.28 kg/m .. weight loss benefits, not only for the current pain symptoms, but also overall health. Recommend a good diet plan that works for the patient, with the assistance of a dietician or primary care doctor as needed. Also, a good, low-impact exercise program for at least 20 minutes per day, 3 times per week, such as exercise bike, elliptical , or pool.  * Exercise: low impact such as stationary bike, elliptical, pool.  * Injections: cortisone versus viscosupplementation (hyaluronic acid, \"rooster comb\", \"gel shots\"); risks and perceived benefits discussed today. Patient elects to proceed.  * Bracing: bracing the knee may offer some relief of symptoms when worn and provide some stability.  * over the counter supplements such as glucosamine and chondroitin sulfate may help with joint pain.  * topical ointments may help as well    * return to clinic as needed.      Flash Boggs M.D., M.S.  Dept. of Orthopaedic Surgery  [facility]    Large Joint " Injection/Arthocentesis: R knee joint    Date/Time: 7/26/2021 3:56 PM  Performed by: Andrea Alvarez PA  Authorized by: Flash Boggs MD     Indications:  Pain  Needle Size:  22 G  Guidance: landmark guided    Location:  Knee      Medications:  80 mg methylPREDNISolone 80 MG/ML; 4 mL bupivacaine 0.25 %  Outcome:  Tolerated well, no immediate complications  Procedure discussed: discussed risks, benefits, and alternatives    Timeout: timeout called immediately prior to procedure    Prep: patient was prepped and draped in usual sterile fashion

## 2021-07-26 NOTE — LETTER
"    7/26/2021         RE: Lance Hogue  [address]  [place] [facility] [address]        Dear Colleague,    Thank you for referring your patient, Lance Hogue, to the [facility]. Please see a copy of my visit note below.    CHIEF COMPLAINT:   Chief Complaint   Patient presents with     Right Knee - RECHECK     RECHECK     Would like to get another cortisone injection, states he has been going up and down the ladder quite a bit the last few days   .      HISTORY OF PRESENT ILLNESS    Lance Hogue is a 60 year old male seen for followup evaluation of ongoing right knee pain with no known injury. Last seen for injections 3/4/2021. Returns today stating the injection worked well up until a couple of weeks ago. He's been going up and down a lot of ladders the past few days working on plastering his garage.  Now he can feel the knee \"pop\" and \"grind\" again. He'd like another injection today.     Pain has been present for 3.5-4 years, or so, progressively getting worse. Pain is constant, day and night, gets worse as the day goes on. Aggravated with bumping the knee, catching the foot, prolonged weight bearing activities. Better with rest, elevation, pillow behind the knee. Treatment has been a brace at work, icy hot at night.    denies low back pain, denies numbness and tingling, denies hip pain.    Present symptoms: pain medially  and anteriorly, pain sharp, shooting, dull/achy , moderate pain, mild swelling.    Pain severity: 7/10  Frequency of symptoms: are constant  Exacerbating Factors: weight bearing, stairs down more than up, prolonged standing  Relieving Factors: rest, sitting  Night Pain: Yes  Pain while at rest: No   Numbness or tingling: No   Patient has tried:     NSAIDS: No      Physical Therapy: Yes      Activity modification: No      Bracing: Yes      Injections: Yes 3/4/2021, 10/2020      Ice: No      Assistive device:  No     Other: icy hot      Other PMH:  has " no past medical history on file.  Patient Active Problem List   Diagnosis     Chronic pain of right knee     Primary osteoarthritis of right knee       Surgical Hx:  has no past surgical history on file.    Medications:   Current Outpatient Medications:      atorvastatin (LIPITOR) 40 MG tablet, Take 1 tablet (40 mg) by mouth daily, Disp: 90 tablet, Rfl: 3     tadalafil (CIALIS) 5 MG tablet, TAKE ONE TABLET BY MOUTH EVERY 24 HOURS , Disp: 30 tablet, Rfl: 0    Current Facility-Administered Medications:      bupivacaine (MARCAINE) 0.25 % injection 4 mL, 4 mL, , , Flash Boggs MD, 4 mL at 03/04/21 1611     methylPREDNISolone (DEPO-MEDROL) injection 80 mg, 80 mg, , , Flash Boggs MD, 80 mg at 03/04/21 1611    Allergies:   Allergies   Allergen Reactions     Chocolate Diarrhea and Cramps       Social Hx: .   reports that he has never smoked. He has quit using smokeless tobacco.  His smokeless tobacco use included snuff. He reports current alcohol use. He reports that he does not use drugs.    Family Hx: family history includes Alcoholism in his brother; Alzheimer Disease in his mother; Cancer in his mother, paternal grandfather, and paternal grandmother; Other - See Comments in his father.    REVIEW OF SYSTEMS:   CONSTITUTIONAL:NEGATIVE for fever, chills, change in weight  INTEGUMENTARY/SKIN: NEGATIVE for worrisome rashes, moles or lesions  MUSCULOSKELETAL:See HPI above  NEURO: NEGATIVE for weakness, dizziness or paresthesias    PHYSICAL EXAM:  BP (!) 165/92   Ht 1.829 m (6')   Wt 108 kg (238 lb)   BMI 32.28 kg/m     GENERAL APPEARANCE: healthy, alert, no distress; accompanied by his wife.  SKIN: no suspicious lesions or rashes  NEURO: Normal strength and tone, mentation intact and speech normal  PSYCH:  mentation appears normal and affect normal, not anxious  RESPIRATORY: No increased work of breathing.      BILATERAL LOWER EXTREMITIES:  Gait: normal  Alignment: varus  No gross deformities  or masses.  No Quad atrophy, strength normal.  Intact sensation deep peroneal nerve, superficial peroneal nerve, med/lat tibial nerve, sural nerve, saphenous nerve  Intact EHL, EDL, TA, FHL, GS, quadriceps hamstrings and hip flexors  Toes warm and well perfused, brisk capillary refill. Palpable 2+ dp pulses.  Bilateral calf soft and nttp or squeeze.  Edema: trace    LEFT KNEE EXAM:    Skin: intact, no ecchymosis or erythema  Squat: 100 %, not limited by pain.     ROM: full extension to 125 flexion  Tight hamstrings on straight leg raise.  Effusion: none  Tender: NTTP med/lat joint line, anterior or posterior knee  McMurrays: negative    MCL: stable, and non-painful at both 0 and 30 degrees knee flexion  Varus stress: stable, and non-painful at both 0 and 30 degrees knee flexion  Lachmans: neg, firm endpoint  Posterior Drawer stable  Patellofemoral joint:                Apprehension: negative              Crepitations: minimal    RIGHT KNEE EXAM:    Skin: intact, no ecchymosis or erythema  Squat: 100 %, not limited by pain.   causes diffuse discomfort.  ROM: full extension to 115 flexion, anterior and medial discomfort. Medial crepitus with range of motion.  Tight hamstrings on straight leg raise.  Effusion: trace  Tender: medial joint line, posterior knee  NTTP lateral joint line.  McMurrays: positive medially.    MCL: stable, and non-painful at both 0 and 30 degrees knee flexion  Varus stress: stable, and non-painful at both 0 and 30 degrees knee flexion  Lachmans: neg, firm endpoint  Posterior Drawer stable  Patellofemoral joint:                Apprehension: negative              Crepitations: mild   Grind: positive.    X-RAY: no new images today. 3 views right knee from 10/28/2020 - no obvious fractures or dislocations. Moderate medial compartment narrowing with subchondral sclerosis, mild patello-femoral degenerative changes with marginal osteophytes.       ASSESSMENT/PLAN: Lance Hogue is a 60 year old  "male with chronic right knee pain, primary osteoarthritis.     * reviewed imaging studies with patient, showing arthritic changes, or wearing of the cartilage in the knee. This can be caused by normal \"wear and tear\" over the years or following prior injury to the knee.  * treatment usually nonsurgical to start, then can progress to surgical with total knee arthroplasty once nonsurgical management effective.  * suspect degenerative medial meniscus tear.    Non-surgical treatment for knee arthritis includes:    * rest, sitting  * Activity modification - avoid impact activities or activities that aggravate symptoms.  * NSAIDS (non-steroidal anti-inflammatory medications; e.g. Aleve, advil, motrin, ibuprofen) - regular use for inflammation ( twice daily or three times daily), with food, as long as no contra-indications Please discuss with primary care doctor if needed  * ice, 15-20 minutes at a time several times a day or as needed.  * Strengthening of quadriceps muscles  * Physical Therapy for strengthening, stretching and range of motion exercises of legs  * Tylenol as needed for pain, consider Tylenol arthritis or similar  * Weight loss: Weight loss:  Body mass index is 32.28 kg/m .. weight loss benefits, not only for the current pain symptoms, but also overall health. Recommend a good diet plan that works for the patient, with the assistance of a dietician or primary care doctor as needed. Also, a good, low-impact exercise program for at least 20 minutes per day, 3 times per week, such as exercise bike, elliptical , or pool.  * Exercise: low impact such as stationary bike, elliptical, pool.  * Injections: cortisone versus viscosupplementation (hyaluronic acid, \"rooster comb\", \"gel shots\"); risks and perceived benefits discussed today. Patient elects to proceed.  * Bracing: bracing the knee may offer some relief of symptoms when worn and provide some stability.  * over the counter supplements such as " glucosamine and chondroitin sulfate may help with joint pain.  * topical ointments may help as well    * return to clinic as needed.      Flash Boggs M.D., M.S.  Dept. of Orthopaedic Surgery  [facility]    Large Joint Injection/Arthocentesis: R knee joint    Date/Time: 7/26/2021 3:56 PM  Performed by: Andrea Alvarez PA  Authorized by: Flash Boggs MD     Indications:  Pain  Needle Size:  22 G  Guidance: landmark guided    Location:  Knee      Medications:  80 mg methylPREDNISolone 80 MG/ML; 4 mL bupivacaine 0.25 %  Outcome:  Tolerated well, no immediate complications  Procedure discussed: discussed risks, benefits, and alternatives    Timeout: timeout called immediately prior to procedure    Prep: patient was prepped and draped in usual sterile fashion              Again, thank you for allowing me to participate in the care of your patient.        Sincerely,        Flash Boggs MD

## 2021-10-01 ENCOUNTER — TRANSFERRED RECORDS (OUTPATIENT)
Dept: HEALTH INFORMATION MANAGEMENT | Facility: CLINIC | Age: 61
End: 2021-10-01

## 2021-10-06 ENCOUNTER — OFFICE VISIT (OUTPATIENT)
Dept: FAMILY MEDICINE | Facility: CLINIC | Age: 61
End: 2021-10-06
Payer: COMMERCIAL

## 2021-10-06 VITALS
TEMPERATURE: 97.5 F | WEIGHT: 239 LBS | SYSTOLIC BLOOD PRESSURE: 139 MMHG | OXYGEN SATURATION: 98 % | BODY MASS INDEX: 32.37 KG/M2 | HEIGHT: 72 IN | HEART RATE: 62 BPM | DIASTOLIC BLOOD PRESSURE: 86 MMHG

## 2021-10-06 DIAGNOSIS — E78.5 HYPERLIPIDEMIA LDL GOAL <100: ICD-10-CM

## 2021-10-06 DIAGNOSIS — N52.03 COMBINED ARTERIAL INSUFFICIENCY AND CORPORO-VENOUS OCCLUSIVE ERECTILE DYSFUNCTION: ICD-10-CM

## 2021-10-06 DIAGNOSIS — Z00.00 ROUTINE GENERAL MEDICAL EXAMINATION AT A HEALTH CARE FACILITY: Primary | ICD-10-CM

## 2021-10-06 DIAGNOSIS — Z00.01 ENCOUNTER FOR ROUTINE ADULT MEDICAL EXAM WITH ABNORMAL FINDINGS: ICD-10-CM

## 2021-10-06 DIAGNOSIS — K40.90 RIGHT INGUINAL HERNIA: ICD-10-CM

## 2021-10-06 PROCEDURE — 90715 TDAP VACCINE 7 YRS/> IM: CPT | Performed by: FAMILY MEDICINE

## 2021-10-06 PROCEDURE — 99396 PREV VISIT EST AGE 40-64: CPT | Mod: 25 | Performed by: FAMILY MEDICINE

## 2021-10-06 PROCEDURE — 90471 IMMUNIZATION ADMIN: CPT | Performed by: FAMILY MEDICINE

## 2021-10-06 RX ORDER — LANOLIN ALCOHOL/MO/W.PET/CERES
CREAM (GRAM) TOPICAL
COMMUNITY
End: 2023-01-15

## 2021-10-06 RX ORDER — CHLORAL HYDRATE 500 MG
CAPSULE ORAL
COMMUNITY

## 2021-10-06 RX ORDER — TADALAFIL 5 MG/1
TABLET ORAL
Qty: 30 TABLET | Refills: 4 | Status: SHIPPED | OUTPATIENT
Start: 2021-10-06 | End: 2021-12-05

## 2021-10-06 RX ORDER — ATORVASTATIN CALCIUM 40 MG/1
40 TABLET, FILM COATED ORAL DAILY
Qty: 90 TABLET | Refills: 3 | Status: SHIPPED | OUTPATIENT
Start: 2021-10-06 | End: 2021-10-29

## 2021-10-06 ASSESSMENT — ENCOUNTER SYMPTOMS
EYE PAIN: 0
JOINT SWELLING: 1
NERVOUS/ANXIOUS: 0
CHILLS: 0
HEMATOCHEZIA: 0
ABDOMINAL PAIN: 0
FEVER: 0
HEARTBURN: 0
MYALGIAS: 0
DIARRHEA: 0
FREQUENCY: 0
PALPITATIONS: 0
DYSURIA: 0
HEADACHES: 0
CONSTIPATION: 0
HEMATURIA: 0
SHORTNESS OF BREATH: 0
PARESTHESIAS: 0
WEAKNESS: 0
DIZZINESS: 0
COUGH: 0
NAUSEA: 0
ARTHRALGIAS: 1
SORE THROAT: 0

## 2021-10-06 ASSESSMENT — MIFFLIN-ST. JEOR: SCORE: 1937.1

## 2021-10-06 NOTE — PROGRESS NOTES
SUBJECTIVE:   CC: Lance Hogue is an 61 year old male who presents for preventative health visit.       Patient has been advised of split billing requirements and indicates understanding: Yes  Healthy Habits:     Getting at least 3 servings of Calcium per day:  Yes    Bi-annual eye exam:  NO    Dental care twice a year:  Yes    Sleep apnea or symptoms of sleep apnea:  None    Diet:  Regular (no restrictions)    Frequency of exercise:  6-7 days/week    Duration of exercise:  Less than 15 minutes    Taking medications regularly:  Yes    Medication side effects:  None    PHQ-2 Total Score: 0    Additional concerns today:  Yes    Dr. Farah.   Colonoscopy last Friday - 3 diminutive polyps   Told to return in 5 years.     Today's PHQ-2 Score:   PHQ-2 ( 1999 [employer]) 10/6/2021   Q1: Little interest or pleasure in doing things 0   Q2: Feeling down, depressed or hopeless 0   PHQ-2 Score 0   Q1: Little interest or pleasure in doing things Not at all   Q2: Feeling down, depressed or hopeless Not at all   PHQ-2 Score 0       Abuse: Current or Past(Physical, Sexual or Emotional)- No  Do you feel safe in your environment? Yes      Social History     Tobacco Use     Smoking status: Never Smoker     Smokeless tobacco: Former User     Types: Snuff   Substance Use Topics     Alcohol use: Yes     If you drink alcohol do you typically have >3 drinks per day or >7 drinks per week? yes    Alcohol Use 10/6/2021   Prescreen: >3 drinks/day or >7 drinks/week? Yes   Prescreen: >3 drinks/day or >7 drinks/week? -   AUDIT SCORE  4     AUDIT - Alcohol Use Disorders Identification Test - Reproduced from the World Health Organization Audit 2001 (Second Edition) 10/6/2021   1.  How often do you have a drink containing alcohol? 4 or more times a week   2.  How many drinks containing alcohol do you have on a typical day when you are drinking? 1 or 2   3.  How often do you have five or more drinks on one occasion? Never   4.  How often during the  last year have you found that you were not able to stop drinking once you had started? Never   5.  How often during the last year have you failed to do what was normally expected of you because of drinking? Never   6.  How often during the last year have you needed a first drink in the morning to get yourself going after a heavy drinking session? Never   7.  How often during the last year have you had a feeling of guilt or remorse after drinking? Never   8.  How often during the last year have you been unable to remember what happened the night before because of your drinking? Never   9.  Have you or someone else been injured because of your drinking? No   10. Has a relative, friend, doctor or other health care worker been concerned about your drinking or suggested you cut down? No   TOTAL SCORE 4       Last PSA:   PSA   Date Value Ref Range Status   09/28/2020 1.34 0 - 4 ug/L Final     Comment:     Assay Method:  Chemiluminescence using Siemens Vista analyzer       Reviewed orders with patient. Reviewed health maintenance and updated orders accordingly - Yes  BP Readings from Last 3 Encounters:   10/14/21 (!) 150/94   10/06/21 139/86   07/26/21 (!) 165/92    Wt Readings from Last 3 Encounters:   10/06/21 108.4 kg (239 lb)   07/26/21 108 kg (238 lb)   04/13/21 106.6 kg (235 lb)                  Patient Active Problem List   Diagnosis     Chronic pain of right knee     Primary osteoarthritis of right knee     History reviewed. No pertinent surgical history.    Social History     Tobacco Use     Smoking status: Never Smoker     Smokeless tobacco: Former User     Types: Snuff   Substance Use Topics     Alcohol use: Yes     Family History   Problem Relation Age of Onset     Alzheimer Disease Mother      Cancer Mother      Other - See Comments Father         blood disease - myldisplasia     Cancer Paternal Grandmother      Cancer Paternal Grandfather      Alcoholism Brother          Current Outpatient Medications    Medication Sig Dispense Refill     atorvastatin (LIPITOR) 40 MG tablet Take 1 tablet (40 mg) by mouth daily 90 tablet 3     tadalafil (CIALIS) 5 MG tablet TAKE ONE TABLET BY MOUTH EVERY 24 HOURS 30 tablet 4     fish oil-omega-3 fatty acids 1000 MG capsule 1 cap(s)       Multiple Vitamin (MULTI VITAMIN) TABS 1 tab(s)       niacin 250 MG CR capsule 1 cap(s) (Patient not taking: Reported on 10/14/2021)       Allergies   Allergen Reactions     Chocolate Diarrhea and Cramps       Reviewed and updated as needed this visit by clinical staff  Tobacco  Allergies  Meds   Med Hx  Surg Hx  Fam Hx  Soc Hx        Reviewed and updated as needed this visit by Provider                    Review of Systems   Constitutional: Negative for chills and fever.   HENT: Negative for congestion, ear pain, hearing loss and sore throat.    Eyes: Negative for pain and visual disturbance.   Respiratory: Negative for cough and shortness of breath.    Cardiovascular: Negative for chest pain, palpitations and peripheral edema.   Gastrointestinal: Negative for abdominal pain, constipation, diarrhea, heartburn, hematochezia and nausea.   Genitourinary: Negative for discharge, dysuria, frequency, genital sores, hematuria and urgency.   Musculoskeletal: Positive for arthralgias and joint swelling. Negative for myalgias.   Skin: Negative for rash.   Neurological: Negative for dizziness, weakness, headaches and paresthesias.   Psychiatric/Behavioral: Negative for mood changes. The patient is not nervous/anxious.      CONSTITUTIONAL: NEGATIVE for fever, chills, change in weight  INTEGUMENTARY/SKIN: NEGATIVE for worrisome rashes, moles or lesions  EYES: NEGATIVE for vision changes or irritation  ENT: NEGATIVE for ear, mouth and throat problems  RESP: NEGATIVE for significant cough or SOB  CV: NEGATIVE for chest pain, palpitations or peripheral edema  GI: NEGATIVE for nausea, abdominal pain, heartburn, or change in bowel habits   male: negative for  dysuria, hematuria, decreased urinary stream, erectile dysfunction, urethral discharge  MUSCULOSKELETAL: NEGATIVE for significant arthralgias or myalgia  NEURO: NEGATIVE for weakness, dizziness or paresthesias  PSYCHIATRIC: NEGATIVE for changes in mood or affect    OBJECTIVE:   /86   Pulse 62   Temp 97.5  F (36.4  C) (Tympanic)   Ht 1.829 m (6')   Wt 108.4 kg (239 lb)   SpO2 98%   BMI 32.41 kg/m      Physical Exam  GENERAL: healthy, alert and no distress  NECK: no adenopathy, no asymmetry, masses, or scars and thyroid normal to palpation  RESP: lungs clear to auscultation - no rales, rhonchi or wheezes  CV: regular rate and rhythm, normal S1 S2, no S3 or S4, no murmur, click or rub, no peripheral edema and peripheral pulses strong  ABDOMEN: Right bulge with coughing soft, nontender, no hepatosplenomegaly, no masses and bowel sounds normal  MS: no gross musculoskeletal defects noted, no edema    Diagnostic Test Results:  Labs reviewed in Epic    ASSESSMENT/PLAN:   (Z00.00) Routine general medical examination at a health care facility  (primary encounter diagnosis)  Plan: GLUCOSE    (Z00.01) Encounter for routine adult medical exam with abnormal findings  LDL Cholesterol Calculated   Date Value Ref Range Status   09/28/2020 116 (H) <100 mg/dL Final     Comment:     Above desirable:  100-129 mg/dl  Borderline High:  130-159 mg/dL  High:             160-189 mg/dL  Very high:       >189 mg/dl       lipitor increased from 20  To 40 mg daily   Plan: atorvastatin (LIPITOR) 40 MG tablet      (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    Weight as of this encounter: 108.4 kg (239 lb).     Weight management plan: Discussed healthy diet and exercise guidelines    He reports that he has never smoked. He has quit using smokeless tobacco.  His smokeless tobacco use included snuff.      Counseling Resources:  ATP IV Guidelines  Pooled Cohorts Equation Calculator  FRAX Risk Assessment  ICSI Preventive Guidelines  Dietary Guidelines for Americans, 2010  USDA's MyPlate  ASA Prophylaxis  Lung CA Screening    Brian Perez MD  [facility]

## 2021-10-14 ENCOUNTER — OFFICE VISIT (OUTPATIENT)
Dept: SURGERY | Facility: CLINIC | Age: 61
End: 2021-10-14
Payer: COMMERCIAL

## 2021-10-14 VITALS — RESPIRATION RATE: 18 BRPM | SYSTOLIC BLOOD PRESSURE: 150 MMHG | DIASTOLIC BLOOD PRESSURE: 94 MMHG | HEART RATE: 81 BPM

## 2021-10-14 DIAGNOSIS — K40.90 RIGHT INGUINAL HERNIA: ICD-10-CM

## 2021-10-14 DIAGNOSIS — Z11.59 ENCOUNTER FOR SCREENING FOR OTHER VIRAL DISEASES: ICD-10-CM

## 2021-10-14 DIAGNOSIS — K42.9 UMBILICAL HERNIA WITHOUT OBSTRUCTION AND WITHOUT GANGRENE: Primary | ICD-10-CM

## 2021-10-14 PROCEDURE — 99203 OFFICE O/P NEW LOW 30 MIN: CPT | Performed by: SURGERY

## 2021-10-14 NOTE — PATIENT INSTRUCTIONS
Per physician instructions.    If you have questions or concerns on any instructions given to you by your provider today or if you need to schedule an appointment, you can reach us at [phone].  Listen to the menu for the Specialty Clinic option.      Thank you!

## 2021-10-14 NOTE — NURSING NOTE
Chief Complaint   Patient presents with     Consult     Right inguinal hernia, umbilical hernia        Initial BP (!) 150/94 (BP Location: Right arm, Patient Position: Chair, Cuff Size: Adult Regular)   Pulse 81   Resp 18  Estimated body mass index is 32.41 kg/m  as calculated from the following:    Height as of 10/6/21: 1.829 m (6').    Weight as of 10/6/21: 108.4 kg (239 lb).  BP completed using cuff size: regular long   Medications and allergies reviewed.      Val DAVIES CMA

## 2021-10-14 NOTE — PROGRESS NOTES
Surgical Consultation/History and Physical  [facility]    Lance is seen in consultation for right inguinal hernia, at the request of Physician No Ref-Primary.    Chief Complaint:  Right inguinal hernia    History of Present Illness: Lance Hogue is a 61 year old male presents with right groin bulge.  Patient admits bulge in right groin for past 15 years, worsening.  He does do heavy lifting at work.  Bulge comes and goes depending on activity.  Denies nausea, vomiting.  Denies chronic constipation.      Patient Active Problem List   Diagnosis     Chronic pain of right knee     Primary osteoarthritis of right knee     History reviewed. No pertinent past medical history.    History reviewed. No pertinent surgical history.    Family History   Problem Relation Age of Onset     Alzheimer Disease Mother      Cancer Mother      Other - See Comments Father         blood disease - myldisplasia     Cancer Paternal Grandmother      Cancer Paternal Grandfather      Alcoholism Brother      Social History     Tobacco Use     Smoking status: Never Smoker     Smokeless tobacco: Former User     Types: Snuff   Substance Use Topics     Alcohol use: Yes      History   Drug Use No     Current Outpatient Medications   Medication Sig Dispense Refill     atorvastatin (LIPITOR) 40 MG tablet Take 1 tablet (40 mg) by mouth daily 90 tablet 3     fish oil-omega-3 fatty acids 1000 MG capsule 1 cap(s)       Multiple Vitamin (MULTI VITAMIN) TABS 1 tab(s)       tadalafil (CIALIS) 5 MG tablet TAKE ONE TABLET BY MOUTH EVERY 24 HOURS 30 tablet 4     niacin 250 MG CR capsule 1 cap(s) (Patient not taking: Reported on 10/14/2021)       Allergies   Allergen Reactions     Chocolate Diarrhea and Cramps     Review of Systems:   10 point ROS otherwise negative    Physical Exam:  BP (!) 150/94 (BP Location: Right arm, Patient Position: Chair, Cuff Size: Adult Regular)   Pulse 81   Resp 18     Constitutional- No acute distress, well  nourished, non-toxic  Eyes: Anicteric, no injection.  PERRL  ENT:  Normocephalic, atraumatic, Nose midline, moist mucus membranes  Neck - supple, no LAD  Respiratory- Clear to auscultation bilaterally, good inspiratory effort  Cardiovascular - Heart RRR, no lift's, thrills, murmurs, rubs, or gallop.  No peripheral edema.  No clubbing.  Abdomen - Soft, non-tender, +BS, no hepatosplenomegaly, reducible umbilical hernia  Groins - 2+ pulses bilaterally and no LAD, easily reducible right inguinal hernia.  ? Laxity left inguinal canal  Neuro - No focal neuro deficits, Alert and oriented x 3  Psych: Appropriate mood and affect  Musculoskeletal: Normal gait, symmetric strength.  FROM upper and lower extremities.  Skin: Warm, Dry    Assessment:  1. Umbilical hernia without obstruction and without gangrene    2. Right inguinal hernia      Plan:   Lance Hogue presents with symptomatic right inguinal hernia, incidental umbilical hernia and possible left inguinal hernia. Symptoms are progressively worsening recently. The patient may benefit from hernia repair, and the indications, risks, benefits and alternatives to surgery were discussed in detail, and the patient understood the counseling offered and wishes to proceed as planned and outlined. Risks specifically discussed include bleeding, infection, seroma, need for additional treatment, nontherapeutic intervention, recurrent hernia, potential need for mesh, potential need for temporary surgical drain, damage to testicular structures, wound complication (such as dehiscence), and rare complications related to surgery and/or anesthesia such as venous thromboembolism and cardiorespiratory complications.    Zay Bright DO on 10/14/2021 at 2:42 PM

## 2021-10-14 NOTE — LETTER
10/14/2021         RE: Lance Hogue  [address]  [place] [address]        Dear Colleague,    Thank you for referring your patient, Lance Hogue, to the [facility]. Please see a copy of my visit note below.    Surgical Consultation/History and Physical  [facility]    Lance is seen in consultation for right inguinal hernia, at the request of Physician No Ref-Primary.    Chief Complaint:  Right inguinal hernia    History of Present Illness: Lance Hogue is a 61 year old male presents with right groin bulge.  Patient admits bulge in right groin for past 15 years, worsening.  He does do heavy lifting at work.  Bulge comes and goes depending on activity.  Denies nausea, vomiting.  Denies chronic constipation.      Patient Active Problem List   Diagnosis     Chronic pain of right knee     Primary osteoarthritis of right knee     History reviewed. No pertinent past medical history.    History reviewed. No pertinent surgical history.    Family History   Problem Relation Age of Onset     Alzheimer Disease Mother      Cancer Mother      Other - See Comments Father         blood disease - myldisplasia     Cancer Paternal Grandmother      Cancer Paternal Grandfather      Alcoholism Brother      Social History     Tobacco Use     Smoking status: Never Smoker     Smokeless tobacco: Former User     Types: Snuff   Substance Use Topics     Alcohol use: Yes      History   Drug Use No     Current Outpatient Medications   Medication Sig Dispense Refill     atorvastatin (LIPITOR) 40 MG tablet Take 1 tablet (40 mg) by mouth daily 90 tablet 3     fish oil-omega-3 fatty acids 1000 MG capsule 1 cap(s)       Multiple Vitamin (MULTI VITAMIN) TABS 1 tab(s)       tadalafil (CIALIS) 5 MG tablet TAKE ONE TABLET BY MOUTH EVERY 24 HOURS 30 tablet 4     niacin 250 MG CR capsule 1 cap(s) (Patient not taking: Reported on 10/14/2021)       Allergies   Allergen Reactions      Chocolate Diarrhea and Cramps     Review of Systems:   10 point ROS otherwise negative    Physical Exam:  BP (!) 150/94 (BP Location: Right arm, Patient Position: Chair, Cuff Size: Adult Regular)   Pulse 81   Resp 18     Constitutional- No acute distress, well nourished, non-toxic  Eyes: Anicteric, no injection.  PERRL  ENT:  Normocephalic, atraumatic, Nose midline, moist mucus membranes  Neck - supple, no LAD  Respiratory- Clear to auscultation bilaterally, good inspiratory effort  Cardiovascular - Heart RRR, no lift's, thrills, murmurs, rubs, or gallop.  No peripheral edema.  No clubbing.  Abdomen - Soft, non-tender, +BS, no hepatosplenomegaly, reducible umbilical hernia  Groins - 2+ pulses bilaterally and no LAD, easily reducible right inguinal hernia.  ? Laxity left inguinal canal  Neuro - No focal neuro deficits, Alert and oriented x 3  Psych: Appropriate mood and affect  Musculoskeletal: Normal gait, symmetric strength.  FROM upper and lower extremities.  Skin: Warm, Dry    Assessment:  1. Umbilical hernia without obstruction and without gangrene    2. Right inguinal hernia      Plan:   Lance Hogue presents with symptomatic right inguinal hernia, incidental umbilical hernia and possible left inguinal hernia. Symptoms are progressively worsening recently. The patient may benefit from hernia repair, and the indications, risks, benefits and alternatives to surgery were discussed in detail, and the patient understood the counseling offered and wishes to proceed as planned and outlined. Risks specifically discussed include bleeding, infection, seroma, need for additional treatment, nontherapeutic intervention, recurrent hernia, potential need for mesh, potential need for temporary surgical drain, damage to testicular structures, wound complication (such as dehiscence), and rare complications related to surgery and/or anesthesia such as venous thromboembolism and cardiorespiratory complications.    Zay  DICKSON Bright DO on 10/14/2021 at 2:42 PM        Again, thank you for allowing me to participate in the care of your patient.        Sincerely,        Zay Bright DO

## 2021-10-22 DIAGNOSIS — N52.03 COMBINED ARTERIAL INSUFFICIENCY AND CORPORO-VENOUS OCCLUSIVE ERECTILE DYSFUNCTION: ICD-10-CM

## 2021-10-22 RX ORDER — TADALAFIL 5 MG/1
TABLET ORAL
Qty: 30 TABLET | Refills: 0 | OUTPATIENT
Start: 2021-10-22

## 2021-10-22 NOTE — TELEPHONE ENCOUNTER
Refused.  10/6/21 refilled 30 tabs with 4 refills to [facility].  Morenita Reis RN

## 2021-10-29 ENCOUNTER — OFFICE VISIT (OUTPATIENT)
Dept: FAMILY MEDICINE | Facility: CLINIC | Age: 61
End: 2021-10-29
Payer: COMMERCIAL

## 2021-10-29 VITALS
SYSTOLIC BLOOD PRESSURE: 146 MMHG | BODY MASS INDEX: 33.05 KG/M2 | WEIGHT: 244 LBS | TEMPERATURE: 97.8 F | DIASTOLIC BLOOD PRESSURE: 80 MMHG | HEIGHT: 72 IN | OXYGEN SATURATION: 98 % | HEART RATE: 70 BPM

## 2021-10-29 DIAGNOSIS — K40.90 RIGHT INGUINAL HERNIA: ICD-10-CM

## 2021-10-29 DIAGNOSIS — Z01.818 PREOP GENERAL PHYSICAL EXAM: Primary | ICD-10-CM

## 2021-10-29 DIAGNOSIS — K42.9 UMBILICAL HERNIA WITHOUT OBSTRUCTION AND WITHOUT GANGRENE: ICD-10-CM

## 2021-10-29 LAB — HGB BLD-MCNC: 14.5 G/DL (ref 13.3–17.7)

## 2021-10-29 PROCEDURE — 36415 COLL VENOUS BLD VENIPUNCTURE: CPT | Performed by: PHYSICIAN ASSISTANT

## 2021-10-29 PROCEDURE — 85018 HEMOGLOBIN: CPT | Performed by: PHYSICIAN ASSISTANT

## 2021-10-29 PROCEDURE — 99213 OFFICE O/P EST LOW 20 MIN: CPT | Performed by: PHYSICIAN ASSISTANT

## 2021-10-29 RX ORDER — ATORVASTATIN CALCIUM 40 MG/1
40 TABLET, FILM COATED ORAL DAILY
Qty: 90 TABLET | Refills: 3 | Status: SHIPPED | OUTPATIENT
Start: 2021-10-29 | End: 2022-11-29

## 2021-10-29 ASSESSMENT — MIFFLIN-ST. JEOR: SCORE: 1959.78

## 2021-10-29 ASSESSMENT — PAIN SCALES - GENERAL: PAINLEVEL: NO PAIN (0)

## 2021-10-29 NOTE — H&P (VIEW-ONLY)
[facility]  [address] [address]  Phone: [phone]  Primary Provider: No Ref-Primary, Physician  Pre-op Performing Provider: SHIRLEY DIAS      PREOPERATIVE EVALUATION:  Today's date: 10/29/2021    Lance Hogue is a 61 year old male who presents for a preoperative evaluation.    Surgical Information:  Surgery/Procedure: HERNIORRHAPHY, INGUINAL, LAPAROSCOPIC; Possible bilateral and HERNIORRHAPHY, UMBILICAL, OPEN  Surgery Location: [facility]  Surgeon: Dr. Bright   Surgery Date: 11/8/2021  Time of Surgery: 9:30am  Where patient plans to recover: At home with family  Fax number for surgical facility: Note does not need to be faxed, will be available electronically in Epic.    Type of Anesthesia Anticipated: General    Assessment & Plan     The proposed surgical procedure is considered INTERMEDIATE risk.      ICD-10-CM    1. Preop general physical exam  Z01.818 Hemoglobin     Hemoglobin   2. Right inguinal hernia  K40.90 Hemoglobin     Hemoglobin   3. Umbilical hernia without obstruction and without gangrene  K42.9 Hemoglobin     Hemoglobin              Risks and Recommendations:  The patient has the following additional risks and recommendations for perioperative complications:   - No identified additional risk factors other than previously addressed      RECOMMENDATION:  APPROVAL GIVEN to proceed with proposed procedure, without further diagnostic evaluation.      Subjective     HPI related to upcoming procedure: inguinal/umbilical hernia    Preop Questions 10/29/2021   1. Have you ever had a heart attack or stroke? No   2. Have you ever had surgery on your heart or blood vessels, such as a stent placement, a coronary artery bypass, or surgery on an artery in your head, neck, heart, or legs? No   3. Do you have chest pain with activity? No   4. Do you have a history of  heart failure? No   5. Do you currently have a cold,  bronchitis or symptoms of other infection? No   6. Do you have a cough, shortness of breath, or wheezing? No   7. Do you or anyone in your family have previous history of blood clots? No   8. Do you or does anyone in your family have a serious bleeding problem such as prolonged bleeding following surgeries or cuts? No   9. Have you ever had problems with anemia or been told to take iron pills? No   10. Have you had any abnormal blood loss such as black, tarry or bloody stools? No   11. Have you ever had a blood transfusion? No   12. Are you willing to have a blood transfusion if it is medically needed before, during, or after your surgery? Yes   13. Have you or any of your relatives ever had problems with anesthesia? No   14. Do you have sleep apnea, excessive snoring or daytime drowsiness? No   15. Do you have any artifical heart valves or other implanted medical devices like a pacemaker, defibrillator, or continuous glucose monitor? No   16. Do you have artificial joints? No   17. Are you allergic to latex? No         Preoperative Review of :   reviewed - no record of controlled substances prescribed.      Status of Chronic Conditions:  See problem list for active medical problems.  Problems all longstanding and stable, except as noted/documented.  See ROS for pertinent symptoms related to these conditions.      Review of Systems  CONSTITUTIONAL: NEGATIVE for fever, chills, change in weight  INTEGUMENTARY/SKIN: NEGATIVE for worrisome rashes, moles or lesions  EYES: NEGATIVE for vision changes or irritation  ENT/MOUTH: NEGATIVE for ear, mouth and throat problems  RESP: NEGATIVE for significant cough or SOB  CV: NEGATIVE for chest pain, palpitations or peripheral edema  GI: NEGATIVE for nausea, abdominal pain, heartburn, or change in bowel habits  : NEGATIVE for frequency, dysuria, or hematuria  MUSCULOSKELETAL: NEGATIVE for significant arthralgias or myalgia  NEURO: NEGATIVE for weakness, dizziness or  paresthesias  ENDOCRINE: NEGATIVE for temperature intolerance, skin/hair changes  HEME: NEGATIVE for bleeding problems  PSYCHIATRIC: NEGATIVE for changes in mood or affect    Patient Active Problem List    Diagnosis Date Noted     Chronic pain of right knee 01/11/2021     Priority: Medium     Primary osteoarthritis of right knee 01/11/2021     Priority: Medium      History reviewed. No pertinent past medical history.  History reviewed. No pertinent surgical history.  Current Outpatient Medications   Medication Sig Dispense Refill     atorvastatin (LIPITOR) 40 MG tablet Take 1 tablet (40 mg) by mouth daily 90 tablet 3     fish oil-omega-3 fatty acids 1000 MG capsule 1 cap(s)       Multiple Vitamin (MULTI VITAMIN) TABS 1 tab(s)       niacin 250 MG CR capsule 1 cap(s) (Patient not taking: Reported on 10/14/2021)       tadalafil (CIALIS) 5 MG tablet TAKE ONE TABLET BY MOUTH EVERY 24 HOURS 30 tablet 4       Allergies   Allergen Reactions     Chocolate Diarrhea and Cramps        Social History     Tobacco Use     Smoking status: Never Smoker     Smokeless tobacco: Former User     Types: Snuff   Substance Use Topics     Alcohol use: Yes     Family History   Problem Relation Age of Onset     Alzheimer Disease Mother      Cancer Mother      Other - See Comments Father         blood disease - myldisplasia     Cancer Paternal Grandmother      Cancer Paternal Grandfather      Alcoholism Brother      History   Drug Use No         Objective     BP (!) 146/80   Pulse 70   Temp 97.8  F (36.6  C) (Tympanic)   Ht 1.829 m (6')   Wt 110.7 kg (244 lb)   SpO2 98%   BMI 33.09 kg/m      Physical Exam    GENERAL APPEARANCE: healthy, alert and no distress     EYES: EOMI,  PERRL     HENT: ear canals and TM's normal and nose and mouth without ulcers or lesions     NECK: no adenopathy, no asymmetry, masses, or scars and thyroid normal to palpation     RESP: lungs clear to auscultation - no rales, rhonchi or wheezes     CV: regular rates  and rhythm, normal S1 S2, no S3 or S4 and no murmur, click or rub     ABDOMEN:  soft, nontender, no HSM or masses and bowel sounds normal     MS: extremities normal- no gross deformities noted, no evidence of inflammation in joints, FROM in all extremities.     SKIN: no suspicious lesions or rashes     NEURO: Normal strength and tone, sensory exam grossly normal, mentation intact and speech normal     PSYCH: mentation appears normal. and affect normal/bright     LYMPHATICS: No cervical adenopathy    No results for input(s): HGB, PLT, INR, NA, POTASSIUM, CR, A1C in the last 02164 hours.     Diagnostics:  Recent Results (from the past 168 hour(s))   Hemoglobin    Collection Time: 10/29/21  3:09 PM   Result Value Ref Range    Hemoglobin 14.5 13.3 - 17.7 g/dL      No EKG required, no history of coronary heart disease, significant arrhythmia, peripheral arterial disease or other structural heart disease.    Revised Cardiac Risk Index (RCRI):  The patient has the following serious cardiovascular risks for perioperative complications:   - No serious cardiac risks = 0 points     RCRI Interpretation: 0 points: Class I (very low risk - 0.4% complication rate)           Signed Electronically by: Betty Blair PA-C  Copy of this evaluation report is provided to requesting physician.

## 2021-10-29 NOTE — PATIENT INSTRUCTIONS

## 2021-10-29 NOTE — PROGRESS NOTES
[facility]  [address] [address]  Phone: [phone]  Primary Provider: No Ref-Primary, Physician  Pre-op Performing Provider: SHIRLEY DIAS      PREOPERATIVE EVALUATION:  Today's date: 10/29/2021    Lance Hogue is a 61 year old male who presents for a preoperative evaluation.    Surgical Information:  Surgery/Procedure: HERNIORRHAPHY, INGUINAL, LAPAROSCOPIC; Possible bilateral and HERNIORRHAPHY, UMBILICAL, OPEN  Surgery Location: [facility]  Surgeon: Dr. Bright   Surgery Date: 11/8/2021  Time of Surgery: 9:30am  Where patient plans to recover: At home with family  Fax number for surgical facility: Note does not need to be faxed, will be available electronically in Epic.    Type of Anesthesia Anticipated: General    Assessment & Plan     The proposed surgical procedure is considered INTERMEDIATE risk.      ICD-10-CM    1. Preop general physical exam  Z01.818 Hemoglobin     Hemoglobin   2. Right inguinal hernia  K40.90 Hemoglobin     Hemoglobin   3. Umbilical hernia without obstruction and without gangrene  K42.9 Hemoglobin     Hemoglobin              Risks and Recommendations:  The patient has the following additional risks and recommendations for perioperative complications:   - No identified additional risk factors other than previously addressed      RECOMMENDATION:  APPROVAL GIVEN to proceed with proposed procedure, without further diagnostic evaluation.      Subjective     HPI related to upcoming procedure: inguinal/umbilical hernia    Preop Questions 10/29/2021   1. Have you ever had a heart attack or stroke? No   2. Have you ever had surgery on your heart or blood vessels, such as a stent placement, a coronary artery bypass, or surgery on an artery in your head, neck, heart, or legs? No   3. Do you have chest pain with activity? No   4. Do you have a history of  heart failure? No   5. Do you currently have a cold,  bronchitis or symptoms of other infection? No   6. Do you have a cough, shortness of breath, or wheezing? No   7. Do you or anyone in your family have previous history of blood clots? No   8. Do you or does anyone in your family have a serious bleeding problem such as prolonged bleeding following surgeries or cuts? No   9. Have you ever had problems with anemia or been told to take iron pills? No   10. Have you had any abnormal blood loss such as black, tarry or bloody stools? No   11. Have you ever had a blood transfusion? No   12. Are you willing to have a blood transfusion if it is medically needed before, during, or after your surgery? Yes   13. Have you or any of your relatives ever had problems with anesthesia? No   14. Do you have sleep apnea, excessive snoring or daytime drowsiness? No   15. Do you have any artifical heart valves or other implanted medical devices like a pacemaker, defibrillator, or continuous glucose monitor? No   16. Do you have artificial joints? No   17. Are you allergic to latex? No         Preoperative Review of :   reviewed - no record of controlled substances prescribed.      Status of Chronic Conditions:  See problem list for active medical problems.  Problems all longstanding and stable, except as noted/documented.  See ROS for pertinent symptoms related to these conditions.      Review of Systems  CONSTITUTIONAL: NEGATIVE for fever, chills, change in weight  INTEGUMENTARY/SKIN: NEGATIVE for worrisome rashes, moles or lesions  EYES: NEGATIVE for vision changes or irritation  ENT/MOUTH: NEGATIVE for ear, mouth and throat problems  RESP: NEGATIVE for significant cough or SOB  CV: NEGATIVE for chest pain, palpitations or peripheral edema  GI: NEGATIVE for nausea, abdominal pain, heartburn, or change in bowel habits  : NEGATIVE for frequency, dysuria, or hematuria  MUSCULOSKELETAL: NEGATIVE for significant arthralgias or myalgia  NEURO: NEGATIVE for weakness, dizziness or  paresthesias  ENDOCRINE: NEGATIVE for temperature intolerance, skin/hair changes  HEME: NEGATIVE for bleeding problems  PSYCHIATRIC: NEGATIVE for changes in mood or affect    Patient Active Problem List    Diagnosis Date Noted     Chronic pain of right knee 01/11/2021     Priority: Medium     Primary osteoarthritis of right knee 01/11/2021     Priority: Medium      History reviewed. No pertinent past medical history.  History reviewed. No pertinent surgical history.  Current Outpatient Medications   Medication Sig Dispense Refill     atorvastatin (LIPITOR) 40 MG tablet Take 1 tablet (40 mg) by mouth daily 90 tablet 3     fish oil-omega-3 fatty acids 1000 MG capsule 1 cap(s)       Multiple Vitamin (MULTI VITAMIN) TABS 1 tab(s)       niacin 250 MG CR capsule 1 cap(s) (Patient not taking: Reported on 10/14/2021)       tadalafil (CIALIS) 5 MG tablet TAKE ONE TABLET BY MOUTH EVERY 24 HOURS 30 tablet 4       Allergies   Allergen Reactions     Chocolate Diarrhea and Cramps        Social History     Tobacco Use     Smoking status: Never Smoker     Smokeless tobacco: Former User     Types: Snuff   Substance Use Topics     Alcohol use: Yes     Family History   Problem Relation Age of Onset     Alzheimer Disease Mother      Cancer Mother      Other - See Comments Father         blood disease - myldisplasia     Cancer Paternal Grandmother      Cancer Paternal Grandfather      Alcoholism Brother      History   Drug Use No         Objective     BP (!) 146/80   Pulse 70   Temp 97.8  F (36.6  C) (Tympanic)   Ht 1.829 m (6')   Wt 110.7 kg (244 lb)   SpO2 98%   BMI 33.09 kg/m      Physical Exam    GENERAL APPEARANCE: healthy, alert and no distress     EYES: EOMI,  PERRL     HENT: ear canals and TM's normal and nose and mouth without ulcers or lesions     NECK: no adenopathy, no asymmetry, masses, or scars and thyroid normal to palpation     RESP: lungs clear to auscultation - no rales, rhonchi or wheezes     CV: regular rates  and rhythm, normal S1 S2, no S3 or S4 and no murmur, click or rub     ABDOMEN:  soft, nontender, no HSM or masses and bowel sounds normal     MS: extremities normal- no gross deformities noted, no evidence of inflammation in joints, FROM in all extremities.     SKIN: no suspicious lesions or rashes     NEURO: Normal strength and tone, sensory exam grossly normal, mentation intact and speech normal     PSYCH: mentation appears normal. and affect normal/bright     LYMPHATICS: No cervical adenopathy    No results for input(s): HGB, PLT, INR, NA, POTASSIUM, CR, A1C in the last 58815 hours.     Diagnostics:  Recent Results (from the past 168 hour(s))   Hemoglobin    Collection Time: 10/29/21  3:09 PM   Result Value Ref Range    Hemoglobin 14.5 13.3 - 17.7 g/dL      No EKG required, no history of coronary heart disease, significant arrhythmia, peripheral arterial disease or other structural heart disease.    Revised Cardiac Risk Index (RCRI):  The patient has the following serious cardiovascular risks for perioperative complications:   - No serious cardiac risks = 0 points     RCRI Interpretation: 0 points: Class I (very low risk - 0.4% complication rate)           Signed Electronically by: Betty Blair PA-C  Copy of this evaluation report is provided to requesting physician.

## 2021-11-04 ENCOUNTER — LAB (OUTPATIENT)
Dept: LAB | Facility: CLINIC | Age: 61
End: 2021-11-04
Payer: COMMERCIAL

## 2021-11-04 DIAGNOSIS — Z11.59 ENCOUNTER FOR SCREENING FOR OTHER VIRAL DISEASES: ICD-10-CM

## 2021-11-04 PROCEDURE — U0003 INFECTIOUS AGENT DETECTION BY NUCLEIC ACID (DNA OR RNA); SEVERE ACUTE RESPIRATORY SYNDROME CORONAVIRUS 2 (SARS-COV-2) (CORONAVIRUS DISEASE [COVID-19]), AMPLIFIED PROBE TECHNIQUE, MAKING USE OF HIGH THROUGHPUT TECHNOLOGIES AS DESCRIBED BY CMS-2020-01-R: HCPCS

## 2021-11-04 PROCEDURE — U0005 INFEC AGEN DETEC AMPLI PROBE: HCPCS

## 2021-11-05 ENCOUNTER — ANESTHESIA EVENT (OUTPATIENT)
Dept: SURGERY | Facility: CLINIC | Age: 61
End: 2021-11-05
Payer: COMMERCIAL

## 2021-11-05 LAB — SARS-COV-2 RNA RESP QL NAA+PROBE: NEGATIVE

## 2021-11-05 NOTE — ANESTHESIA PREPROCEDURE EVALUATION
Anesthesia Pre-Procedure Evaluation    Patient: Lance Hogue   MRN: [id number] : 1960        Preoperative Diagnosis: Right inguinal hernia [K40.90]  Umbilical hernia without obstruction and without gangrene [K42.9]    Procedure : Procedure(s):  HERNIORRHAPHY, INGUINAL, LAPAROSCOPIC; Possible bilateral  HERNIORRHAPHY, UMBILICAL, OPEN          No past medical history on file.   Past Surgical History:   Procedure Laterality Date     ENT SURGERY      Tonsils, wisdom teeth     GENITOURINARY SURGERY      vasectomy      Allergies   Allergen Reactions     Chocolate Diarrhea and Cramps      Social History     Tobacco Use     Smoking status: Never Smoker     Smokeless tobacco: Former User     Types: Snuff   Substance Use Topics     Alcohol use: Yes      Wt Readings from Last 1 Encounters:   10/29/21 110.7 kg (244 lb)        Anesthesia Evaluation   Pt has had prior anesthetic. Type: General.    No history of anesthetic complications       ROS/MED HX  ENT/Pulmonary:  - neg pulmonary ROS     Neurologic:  - neg neurologic ROS     Cardiovascular:     (+) Dyslipidemia -----    METS/Exercise Tolerance: >4 METS    Hematologic:  - neg hematologic  ROS     Musculoskeletal: Comment: Chronic knee pain  (+) arthritis,     GI/Hepatic: Comment: Inguinal hernia - neg GI/hepatic ROS     Renal/Genitourinary:  - neg Renal ROS     Endo:     (+) Obesity,     Psychiatric/Substance Use:  - neg psychiatric ROS     Infectious Disease:  - neg infectious disease ROS     Malignancy:  - neg malignancy ROS     Other:  - neg other ROS          Physical Exam    Airway        Mallampati: II   TM distance: > 3 FB   Neck ROM: full   Mouth opening: > 3 cm    Respiratory Devices and Support         Dental  no notable dental history         Cardiovascular   cardiovascular exam normal          Pulmonary   pulmonary exam normal                OUTSIDE LABS:  CBC:   Lab Results   Component Value Date    WBC 6.4 2019    HGB 14.5 10/29/2021    HGB  15.1 09/25/2019    HCT 44.9 09/25/2019     09/25/2019     BMP:   Lab Results   Component Value Date    GLC 89 09/25/2019     COAGS: No results found for: PTT, INR, FIBR  POC: No results found for: BGM, HCG, HCGS  HEPATIC: No results found for: ALBUMIN, PROTTOTAL, ALT, AST, GGT, ALKPHOS, BILITOTAL, BILIDIRECT, JOB  OTHER: No results found for: PH, LACT, A1C, STEPHEN, PHOS, MAG, LIPASE, AMYLASE, TSH, T4, T3, CRP, SED    Anesthesia Plan    ASA Status:  1   NPO Status:  NPO Appropriate    Anesthesia Type: General.     - Airway: ETT   Induction: Intravenous, Propofol.   Maintenance: Balanced.   Techniques and Equipment:     - Airway: Video-Laryngoscope         Consents    Anesthesia Plan(s) and associated risks, benefits, and realistic alternatives discussed. Questions answered and patient/representative(s) expressed understanding.     - Discussed with:  Patient      - Extended Intubation/Ventilatory Support Discussed: No.      - Patient is DNR/DNI Status: No    Use of blood products discussed: No .     Postoperative Care    Pain management: IV analgesics, Oral pain medications, Multi-modal analgesia.   PONV prophylaxis: Ondansetron (or other 5HT-3), Dexamethasone or Solumedrol     Comments:                FRANKIE Denney CRNA

## 2021-11-08 ENCOUNTER — HOSPITAL ENCOUNTER (OUTPATIENT)
Facility: CLINIC | Age: 61
Discharge: HOME | End: 2021-11-08
Attending: SURGERY | Admitting: SURGERY
Payer: COMMERCIAL

## 2021-11-08 ENCOUNTER — ANESTHESIA (OUTPATIENT)
Dept: SURGERY | Facility: CLINIC | Age: 61
End: 2021-11-08
Payer: COMMERCIAL

## 2021-11-08 VITALS
SYSTOLIC BLOOD PRESSURE: 157 MMHG | OXYGEN SATURATION: 96 % | HEIGHT: 72 IN | WEIGHT: 233 LBS | RESPIRATION RATE: 16 BRPM | BODY MASS INDEX: 31.56 KG/M2 | HEART RATE: 62 BPM | TEMPERATURE: 97.7 F | DIASTOLIC BLOOD PRESSURE: 104 MMHG

## 2021-11-08 DIAGNOSIS — K40.20 NON-RECURRENT BILATERAL INGUINAL HERNIA WITHOUT OBSTRUCTION OR GANGRENE: Primary | ICD-10-CM

## 2021-11-08 DIAGNOSIS — K40.90 RIGHT INGUINAL HERNIA: ICD-10-CM

## 2021-11-08 DIAGNOSIS — K42.9 UMBILICAL HERNIA WITHOUT OBSTRUCTION AND WITHOUT GANGRENE: ICD-10-CM

## 2021-11-08 PROCEDURE — 710N000009 HC RECOVERY PHASE 1, LEVEL 1, PER MIN: Performed by: SURGERY

## 2021-11-08 PROCEDURE — 250N000009 HC RX 250: Performed by: NURSE ANESTHETIST, CERTIFIED REGISTERED

## 2021-11-08 PROCEDURE — 271N000001 HC OR GENERAL SUPPLY NON-STERILE: Performed by: SURGERY

## 2021-11-08 PROCEDURE — 250N000011 HC RX IP 250 OP 636: Performed by: SURGERY

## 2021-11-08 PROCEDURE — 49650 LAP ING HERNIA REPAIR INIT: CPT | Mod: 50 | Performed by: SURGERY

## 2021-11-08 PROCEDURE — 999N000141 HC STATISTIC PRE-PROCEDURE NURSING ASSESSMENT: Performed by: SURGERY

## 2021-11-08 PROCEDURE — 250N000013 HC RX MED GY IP 250 OP 250 PS 637: Performed by: NURSE ANESTHETIST, CERTIFIED REGISTERED

## 2021-11-08 PROCEDURE — 370N000017 HC ANESTHESIA TECHNICAL FEE, PER MIN: Performed by: SURGERY

## 2021-11-08 PROCEDURE — 710N000012 HC RECOVERY PHASE 2, PER MINUTE: Performed by: SURGERY

## 2021-11-08 PROCEDURE — 250N000009 HC RX 250: Performed by: SURGERY

## 2021-11-08 PROCEDURE — 250N000011 HC RX IP 250 OP 636: Performed by: NURSE ANESTHETIST, CERTIFIED REGISTERED

## 2021-11-08 PROCEDURE — 258N000003 HC RX IP 258 OP 636: Performed by: NURSE ANESTHETIST, CERTIFIED REGISTERED

## 2021-11-08 PROCEDURE — 272N000001 HC OR GENERAL SUPPLY STERILE: Performed by: SURGERY

## 2021-11-08 PROCEDURE — C1781 MESH (IMPLANTABLE): HCPCS | Performed by: SURGERY

## 2021-11-08 PROCEDURE — 250N000025 HC SEVOFLURANE, PER MIN: Performed by: SURGERY

## 2021-11-08 PROCEDURE — 360N000077 HC SURGERY LEVEL 4, PER MIN: Performed by: SURGERY

## 2021-11-08 DEVICE — MESH DEXTILE ANATOMICAL 15CM X 10CM LG RIGHT DXT1510AR: Type: IMPLANTABLE DEVICE | Site: GROIN | Status: FUNCTIONAL

## 2021-11-08 DEVICE — MESH DEXTILE ANATOMICAL 15CM X 10CM LG LEFT DXT1510AL: Type: IMPLANTABLE DEVICE | Site: GROIN | Status: FUNCTIONAL

## 2021-11-08 RX ORDER — OXYCODONE HYDROCHLORIDE 5 MG/1
5 TABLET ORAL EVERY 6 HOURS PRN
Qty: 12 TABLET | Refills: 0 | Status: SHIPPED | OUTPATIENT
Start: 2021-11-08 | End: 2021-11-11

## 2021-11-08 RX ORDER — SODIUM CHLORIDE, SODIUM LACTATE, POTASSIUM CHLORIDE, CALCIUM CHLORIDE 600; 310; 30; 20 MG/100ML; MG/100ML; MG/100ML; MG/100ML
INJECTION, SOLUTION INTRAVENOUS CONTINUOUS
Status: DISCONTINUED | OUTPATIENT
Start: 2021-11-08 | End: 2021-11-08 | Stop reason: HOSPADM

## 2021-11-08 RX ORDER — ALBUTEROL SULFATE 0.83 MG/ML
2.5 SOLUTION RESPIRATORY (INHALATION)
Status: DISCONTINUED | OUTPATIENT
Start: 2021-11-08 | End: 2021-11-08 | Stop reason: HOSPADM

## 2021-11-08 RX ORDER — ACETAMINOPHEN 325 MG/1
975 TABLET ORAL ONCE
Status: COMPLETED | OUTPATIENT
Start: 2021-11-08 | End: 2021-11-08

## 2021-11-08 RX ORDER — LIDOCAINE HYDROCHLORIDE AND EPINEPHRINE 10; 10 MG/ML; UG/ML
INJECTION, SOLUTION INFILTRATION; PERINEURAL PRN
Status: DISCONTINUED | OUTPATIENT
Start: 2021-11-08 | End: 2021-11-08 | Stop reason: HOSPADM

## 2021-11-08 RX ORDER — PROPOFOL 10 MG/ML
INJECTION, EMULSION INTRAVENOUS PRN
Status: DISCONTINUED | OUTPATIENT
Start: 2021-11-08 | End: 2021-11-08

## 2021-11-08 RX ORDER — CEFAZOLIN SODIUM 2 G/100ML
2 INJECTION, SOLUTION INTRAVENOUS SEE ADMIN INSTRUCTIONS
Status: DISCONTINUED | OUTPATIENT
Start: 2021-11-08 | End: 2021-11-08 | Stop reason: HOSPADM

## 2021-11-08 RX ORDER — ONDANSETRON 4 MG/1
4 TABLET, ORALLY DISINTEGRATING ORAL EVERY 30 MIN PRN
Status: DISCONTINUED | OUTPATIENT
Start: 2021-11-08 | End: 2021-11-08 | Stop reason: HOSPADM

## 2021-11-08 RX ORDER — HYDROMORPHONE HCL IN WATER/PF 6 MG/30 ML
0.2 PATIENT CONTROLLED ANALGESIA SYRINGE INTRAVENOUS EVERY 5 MIN PRN
Status: DISCONTINUED | OUTPATIENT
Start: 2021-11-08 | End: 2021-11-08 | Stop reason: HOSPADM

## 2021-11-08 RX ORDER — MAGNESIUM SULFATE HEPTAHYDRATE 40 MG/ML
2 INJECTION, SOLUTION INTRAVENOUS ONCE
Status: COMPLETED | OUTPATIENT
Start: 2021-11-08 | End: 2021-11-08

## 2021-11-08 RX ORDER — BUPIVACAINE HYDROCHLORIDE 5 MG/ML
INJECTION, SOLUTION PERINEURAL PRN
Status: DISCONTINUED | OUTPATIENT
Start: 2021-11-08 | End: 2021-11-08 | Stop reason: HOSPADM

## 2021-11-08 RX ORDER — KETOROLAC TROMETHAMINE 30 MG/ML
INJECTION, SOLUTION INTRAMUSCULAR; INTRAVENOUS PRN
Status: DISCONTINUED | OUTPATIENT
Start: 2021-11-08 | End: 2021-11-08

## 2021-11-08 RX ORDER — LIDOCAINE 40 MG/G
CREAM TOPICAL
Status: DISCONTINUED | OUTPATIENT
Start: 2021-11-08 | End: 2021-11-08 | Stop reason: HOSPADM

## 2021-11-08 RX ORDER — MEPERIDINE HYDROCHLORIDE 25 MG/ML
12.5 INJECTION INTRAMUSCULAR; INTRAVENOUS; SUBCUTANEOUS
Status: DISCONTINUED | OUTPATIENT
Start: 2021-11-08 | End: 2021-11-08 | Stop reason: HOSPADM

## 2021-11-08 RX ORDER — KETAMINE HYDROCHLORIDE 10 MG/ML
INJECTION INTRAMUSCULAR; INTRAVENOUS PRN
Status: DISCONTINUED | OUTPATIENT
Start: 2021-11-08 | End: 2021-11-08

## 2021-11-08 RX ORDER — DOCUSATE SODIUM 100 MG/1
100 CAPSULE, LIQUID FILLED ORAL 2 TIMES DAILY
Refills: 0 | COMMUNITY
Start: 2021-11-08 | End: 2024-02-04

## 2021-11-08 RX ORDER — GLYCOPYRROLATE 0.2 MG/ML
INJECTION, SOLUTION INTRAMUSCULAR; INTRAVENOUS PRN
Status: DISCONTINUED | OUTPATIENT
Start: 2021-11-08 | End: 2021-11-08

## 2021-11-08 RX ORDER — ONDANSETRON 2 MG/ML
4 INJECTION INTRAMUSCULAR; INTRAVENOUS EVERY 30 MIN PRN
Status: DISCONTINUED | OUTPATIENT
Start: 2021-11-08 | End: 2021-11-08 | Stop reason: HOSPADM

## 2021-11-08 RX ORDER — LIDOCAINE HYDROCHLORIDE 10 MG/ML
INJECTION, SOLUTION EPIDURAL; INFILTRATION; INTRACAUDAL; PERINEURAL PRN
Status: DISCONTINUED | OUTPATIENT
Start: 2021-11-08 | End: 2021-11-08

## 2021-11-08 RX ORDER — DEXAMETHASONE SODIUM PHOSPHATE 4 MG/ML
INJECTION, SOLUTION INTRA-ARTICULAR; INTRALESIONAL; INTRAMUSCULAR; INTRAVENOUS; SOFT TISSUE PRN
Status: DISCONTINUED | OUTPATIENT
Start: 2021-11-08 | End: 2021-11-08

## 2021-11-08 RX ORDER — GABAPENTIN 300 MG/1
300 CAPSULE ORAL
Status: COMPLETED | OUTPATIENT
Start: 2021-11-08 | End: 2021-11-08

## 2021-11-08 RX ORDER — DIMENHYDRINATE 50 MG/ML
25 INJECTION, SOLUTION INTRAMUSCULAR; INTRAVENOUS
Status: DISCONTINUED | OUTPATIENT
Start: 2021-11-08 | End: 2021-11-08 | Stop reason: HOSPADM

## 2021-11-08 RX ORDER — FENTANYL CITRATE 50 UG/ML
25 INJECTION, SOLUTION INTRAMUSCULAR; INTRAVENOUS
Status: DISCONTINUED | OUTPATIENT
Start: 2021-11-08 | End: 2021-11-08 | Stop reason: HOSPADM

## 2021-11-08 RX ORDER — OXYCODONE HYDROCHLORIDE 5 MG/1
5 TABLET ORAL EVERY 4 HOURS PRN
Status: DISCONTINUED | OUTPATIENT
Start: 2021-11-08 | End: 2021-11-08 | Stop reason: HOSPADM

## 2021-11-08 RX ORDER — ONDANSETRON 2 MG/ML
INJECTION INTRAMUSCULAR; INTRAVENOUS PRN
Status: DISCONTINUED | OUTPATIENT
Start: 2021-11-08 | End: 2021-11-08

## 2021-11-08 RX ORDER — FENTANYL CITRATE 50 UG/ML
INJECTION, SOLUTION INTRAMUSCULAR; INTRAVENOUS PRN
Status: DISCONTINUED | OUTPATIENT
Start: 2021-11-08 | End: 2021-11-08

## 2021-11-08 RX ORDER — CEFAZOLIN SODIUM 2 G/100ML
2 INJECTION, SOLUTION INTRAVENOUS
Status: COMPLETED | OUTPATIENT
Start: 2021-11-08 | End: 2021-11-08

## 2021-11-08 RX ORDER — FENTANYL CITRATE 50 UG/ML
25 INJECTION, SOLUTION INTRAMUSCULAR; INTRAVENOUS EVERY 5 MIN PRN
Status: DISCONTINUED | OUTPATIENT
Start: 2021-11-08 | End: 2021-11-08 | Stop reason: HOSPADM

## 2021-11-08 RX ADMIN — GABAPENTIN 300 MG: 300 CAPSULE ORAL at 08:44

## 2021-11-08 RX ADMIN — KETOROLAC TROMETHAMINE 15 MG: 30 INJECTION, SOLUTION INTRAMUSCULAR at 11:11

## 2021-11-08 RX ADMIN — KETAMINE HYDROCHLORIDE 50 MG: 10 INJECTION, SOLUTION INTRAMUSCULAR; INTRAVENOUS at 10:30

## 2021-11-08 RX ADMIN — FENTANYL CITRATE 150 MCG: 50 INJECTION, SOLUTION INTRAMUSCULAR; INTRAVENOUS at 10:06

## 2021-11-08 RX ADMIN — SUGAMMADEX 200 MG: 100 INJECTION, SOLUTION INTRAVENOUS at 11:22

## 2021-11-08 RX ADMIN — ROCURONIUM BROMIDE 10 MG: 50 INJECTION, SOLUTION INTRAVENOUS at 10:05

## 2021-11-08 RX ADMIN — MIDAZOLAM 2 MG: 1 INJECTION INTRAMUSCULAR; INTRAVENOUS at 10:00

## 2021-11-08 RX ADMIN — ROCURONIUM BROMIDE 40 MG: 50 INJECTION, SOLUTION INTRAVENOUS at 10:06

## 2021-11-08 RX ADMIN — GLYCOPYRROLATE 0.2 MG: 0.2 INJECTION, SOLUTION INTRAMUSCULAR; INTRAVENOUS at 10:06

## 2021-11-08 RX ADMIN — LIDOCAINE HYDROCHLORIDE 50 MG: 10 INJECTION, SOLUTION EPIDURAL; INFILTRATION; INTRACAUDAL; PERINEURAL at 10:06

## 2021-11-08 RX ADMIN — PROPOFOL 200 MG: 10 INJECTION, EMULSION INTRAVENOUS at 10:06

## 2021-11-08 RX ADMIN — MAGNESIUM SULFATE HEPTAHYDRATE 2 G: 40 INJECTION, SOLUTION INTRAVENOUS at 10:14

## 2021-11-08 RX ADMIN — SODIUM CHLORIDE, POTASSIUM CHLORIDE, SODIUM LACTATE AND CALCIUM CHLORIDE: 600; 310; 30; 20 INJECTION, SOLUTION INTRAVENOUS at 10:57

## 2021-11-08 RX ADMIN — FENTANYL CITRATE 100 MCG: 50 INJECTION, SOLUTION INTRAMUSCULAR; INTRAVENOUS at 11:11

## 2021-11-08 RX ADMIN — LIDOCAINE HYDROCHLORIDE 0.1 ML: 10 INJECTION, SOLUTION EPIDURAL; INFILTRATION; INTRACAUDAL; PERINEURAL at 08:54

## 2021-11-08 RX ADMIN — ONDANSETRON 4 MG: 2 INJECTION INTRAMUSCULAR; INTRAVENOUS at 10:06

## 2021-11-08 RX ADMIN — SODIUM CHLORIDE, POTASSIUM CHLORIDE, SODIUM LACTATE AND CALCIUM CHLORIDE: 600; 310; 30; 20 INJECTION, SOLUTION INTRAVENOUS at 08:54

## 2021-11-08 RX ADMIN — DEXAMETHASONE SODIUM PHOSPHATE 4 MG: 4 INJECTION, SOLUTION INTRA-ARTICULAR; INTRALESIONAL; INTRAMUSCULAR; INTRAVENOUS; SOFT TISSUE at 10:06

## 2021-11-08 RX ADMIN — ACETAMINOPHEN 975 MG: 325 TABLET, FILM COATED ORAL at 08:44

## 2021-11-08 RX ADMIN — CEFAZOLIN SODIUM 2 G: 2 INJECTION, SOLUTION INTRAVENOUS at 10:00

## 2021-11-08 ASSESSMENT — MIFFLIN-ST. JEOR: SCORE: 1909.88

## 2021-11-08 NOTE — ANESTHESIA POSTPROCEDURE EVALUATION
Patient: Lance Hogue    Procedure: Procedure(s):  HERNIORRHAPHY, INGUINAL, LAPAROSCOPIC; BILATERAL, UMBILICAL HERNIA REPAIR  HERNIORRHAPHY, UMBILICAL, OPEN       Diagnosis:Right inguinal hernia [K40.90]  Umbilical hernia without obstruction and without gangrene [K42.9]  Diagnosis Additional Information: No value filed.    Anesthesia Type:  General    Note:  Disposition: Outpatient   Postop Pain Control: Uneventful            Sign Out: Well controlled pain   PONV: No   Neuro/Psych: Uneventful            Sign Out: Acceptable/Baseline neuro status   Airway/Respiratory: Uneventful            Sign Out: Acceptable/Baseline resp. status   CV/Hemodynamics: Uneventful            Sign Out: Acceptable CV status; No obvious hypovolemia; No obvious fluid overload   Other NRE: NONE   DID A NON-ROUTINE EVENT OCCUR? No           Last vitals:  Vitals Value Taken Time   /95 11/08/21 1227   Temp 36.5  C (97.7  F) 11/08/21 1225   Pulse 57 11/08/21 1229   Resp 4 11/08/21 1229   SpO2 95 % 11/08/21 1229   Vitals shown include unvalidated device data.    Electronically Signed By: FRANKIE Melendrez CRNA  November 8, 2021  12:40 PM

## 2021-11-08 NOTE — OP NOTE
Operative Note     Pre-Operative Diagnosis: Bilateral Inguinal Hernia, Reducible umbilical hernia  Post-Operative Diagnosis: Same     Procedure:  Laparoscopic Bilateral Inguinal Hernia Repair with Mesh; Primary umbilical hernia     Surgeon: Zay Bright DO     Anesthesia: General Endotracheal Anesthesia, Local Anesthesia (1% Lidocaine with epinephrine, 0.25% Marcaine)     EBL: 3cc     Operative Date: 11/08/21      Indications: Lance Hogue presents with pain related to an enlarging bulge of the Bilateral groin and incidental umbilical hernia, and is found to have areducible, tender Right inguinal hernia on examination. He was counseled about the indications, risks, benefits and alternatives to surgery, and his questions were addressed. He understands and wishes to proceed.      Procedure: After informed consent was obtained, the patient was brought to the operating room and placed supine on the operating room table. Bilateral lower extremity sequential compression devices were placed and functioning prior to induction of anesthesia, which was then administered and included an endotracheal tube. A urinary catheter was not deemed necessary as the patient urinated just prior to transport to the operating room. The abdominal wall was prepped and draped in the standard fashion.      A supraumbilical incision was made and dissection carried out to the anterior rectus fascia.  The umbilical stalk was encircled and transected. Once intra-abdominal entry was visually established, an 11mm trochar was delivered and pneumoperitoneum administered. Exploration of the abdominal cavity confirmed a small right pantaloon and small direct left inguinal hernia. There were no other concerning findings on exploration. Additional working ports were placed in the bilateral upper quadrant abdomen,  - and each measured 5mm. The patient was positioned in the Trendelenberg positon.     The peritoneum was scored anterior to the right  hernia, and the preperitoneal space dissected carefully. The hernia sac was carefully dissected free from the spermatic cord contents, taking care to avoid injuring the spermatic cord vessels and vas deferens. Once dissection appeared adequate, a Dextile mesh prosthesis was delivered intra-abdominal and situated in the preperitoneal space. Transfixion was then accomplished first at the pubic tubercle and once lateral using the absorbatacker, and the repair was reassessed and appeared satisfactory.     The peritoneum was scored anterior to the lefthernia, and the preperitoneal space dissected carefully. The hernia sac was carefully dissected free from the spermatic cord contents, taking care to avoid injuring the spermatic cord vessels and vas deferens.  A large cord lipoma was dissected free. Once dissection appeared adequate, a Dextile mesh prosthesis was delivered intra-abdominal and situated in the preperitoneal space. Transfixion was then accomplished first at the pubic tubercle and once lateral using the absorbatacker, and the repair was reassessed and appeared satisfactory.    Hemostasis was assured, and the peritoneum was closed using a absorbatacker. Each of the trochars was then removed, and an instrument count (including laparotomy pads, sponges and needles) was performed and found to be correct.      The umbilical defect was small, approximately 5mm and appeared appropriate for primary closure.  0-0 prolene suture was used to close the hernia defect.  The umbilical stalk was re-approximated with a 3-0 Vicryl. The subcutaneous closure was accomplished in layers using 3-0 Vicryl, and all the skin incisions were closed using 4-0 Monocryl. The wounds were cleansed and dried, and dressed with sterile glue.      The patient tolerated the procedure well, was allowed to recover from anesthesia, extubated without incident and transferred to the Recovery Room in stable condition after verifying the location of both  testes within the scrotum at the conclusion of the operation.     Zay Bright DO on 11/8/2021 at 12:08 PM

## 2021-11-08 NOTE — DISCHARGE INSTRUCTIONS
HOME CARE FOLLOWING ABDOMINAL SURGERY    INCISIONAL CARE:  Replace the bandage over your incision (or incisions) until all drainage stops, or if more comfortable to have in place.  If present, leave the steri-strips (white paper tapes) in place for 14 days after surgery.  If you have staples in your incision at the time of discharge, they will be removed at your follow-up appointment.  If Dermabond (a type of skin glue) is present, leave in place until it wears/flakes off.     BATHING:  Avoid baths for 1 week after surgery.  Showers are okay.  You may wash your hair at any time.  Gently pat your incision dry after bathing.    ACTIVITY:  Light Activity -- you may immediately be up and about as tolerated.  Driving -- you may drive when comfortable and off narcotic pain medications (example: Norco, Percocet, Hydrocodone).  Light Work -- resume when comfortable off pain medications.  (If you can drive, you probably can work.)  Strenuous Work/Activity -- limit lifting to 20 pounds for 4 weeks.  Then, progressively increase with time.  Active Sports (running, biking, etc.) -- cautiously resume after 6 weeks.  Most importantly listen to your body.  If an activity causes pain or discomfort please stop and try in a few days or decrease your intensity.    DISCOMFORT:  Use pain medications as prescribed by your surgeon.  Take the pain medication with some food, when possible, to minimize side effects.  Expect gradual improvement.      Narcotic Pain Medications:  Do not drive, make important decisions or operate heavy machinery while on narcotic pain medications.  Narcotic pain medications can be associated with nausea, sleep disturbance, and constipation.  Unless directed otherwise, please take an over the counter stool softener (Colace 100mg twice a day) unless directed otherwise.  As soon as you are able to stop narcotic pain medications the better. Long term use of narcotics is associated with tolerance (the need for more  medication for effect) and potential addiction.    DIET:  Return to diet you were on before surgery, unless you are given specific diet instructions.  Drink plenty of fluids.  While taking pain medications, increase dietary fiber or add a fiber supplementation like Metamucil or Citrucel to help prevent constipation - a possible side effect of pain medications.    NAUSEA:  If nauseated from the anesthetic/pain meds; rest in bed, get up cautiously with assistance, and drink clear liquids (juice, tea, broth).    RETURN APPOINTMENT:  Schedule a follow-up visit 2-3 weeks after discharge from the hospital.  Office Phone: [phone]     CONTACT US IF THE FOLLOWING DEVELOPS:   1. A fever that is above 101     2. If there is a large amount of drainage, bleeding, or swelling.   3. Severe pain that is not relieved by your prescription.   4. Drainage that is thick, cloudy, yellow, green or white.   5. Any other questions not answered by  Frequently Asked Questions  sheet.      FREQUENTLY ASKED QUESTIONS:    Q:  How should my incision look?    A:  Normally your incision will appear slightly swollen with light redness directly along the incision itself as it heals.  It may feel like a bump or ridge as the healing/scarring happens, and over time (3-4 months) this bump or ridge feeling should slowly go away.  In general, clear or pink watery drainage can be normal at first as your incision heals, but should decrease over time.    Q:  How do I know if my incision is infected?  A:  Look at your incision for signs of infection, like redness around the incision spreading to surrounding skin, or drainage of cloudy or foul-smelling drainage.  If you feel warm, check your temperature to see if you are running a fever.    **If any of these things occur, please notify the nurse at our office.  We may need you to come into the office for an incision check.      Q:  How do I take care of my incision?  A:  If you have a dressing in place -  Starting the day after surgery, replace the dressing 1-2 times a day until there is no further drainage from the incision.  At that time, a dressing is no longer needed.  Try to minimize tape on the skin if irritation is occurring at the tape sites.  If you have significant irritation from tape on the skin, please call the office to discuss other method of dressing your incision.    Small pieces of tape called  steri-strips  may be present directly overlying your incision; these may be removed 10 days after surgery unless otherwise specified by your surgeon.  If these tapes start to loosen at the ends, you may trim them back until they fall off or are removed.    A:  If you had  Dermabond  tissue glue used as a dressing (this causes your incision to look shiny with a clear covering over it) - This type of dressing wears off with time and does not require more dressings over the top unless it is draining around the glue as it wears off.  Do not apply ointments or lotions over the incisions until the glue has completely worn off.    Q:  There is a piece of tape or a sticky  lead  still on my skin.  Can I remove this?  A:  Sometimes the sticky  leads  used for monitoring during surgery or for evaluation in the emergency department are not all removed while you are in the hospital.  These sometimes have a tab or metal dot on them.  You can easily remove these on your own, like taking off a band-aid.  If there is a gel substance under the  lead , simply wipe/clean it off with a washcloth or paper towel.      Q:  What can I do to minimize constipation (very hard stools, or lack of stools)?  A:  Stay well hydrated.  Increase your dietary fiber intake or take a fiber supplement -with plenty of water.  Walk around frequently.  You may consider an over-the-counter stool-softener.  Your Pharmacist can assist you with choosing one that is stocked at your pharmacy.  Constipation is also one of the most common side effects of  pain medication.  If you are using pain medication, be pro-active and try to PREVENT problems with constipation by taking the steps above BEFORE constipation becomes a problem.    Q:  What do I do if I need more pain medications?  A:  Call the office to receive refills.  Be aware that certain pain meds cannot be called into a pharmacy and actually require a paper prescription.  A change may be made in your pain med as you progress thru your recovery period or if you have side effects to certain meds.    --Pain meds are NOT refilled after 5pm on weekdays, and NOT AT ALL on the weekends, so please look ahead to prevent problems.      Q:  Why am I having a hard time sleeping now that I am at home?  A:  Many medications you receive while you are in the hospital can impact your sleep for a number of days after your surgery/hospitalization.  Decreased level of activity and naps during the day may also make sleeping at night difficult.  Try to minimize day-time naps, and get up frequently during the day to walk around your home during your recovery time.  Sleep aides may be of some help, but are not recommended for long-term use.      Q:  I am having some back discomfort.  What should I do?  A:  This may be related to certain positioning that was required for your surgery, extended periods of time in bed, or other changes in your overall activity level.  You may try ice, heat, acetaminophen, or ibuprofen to treat this temporarily.  Note that many pain medications have acetaminophen in them and would state this on the prescription bottle.  Be sure not to exceed the maximum of 4000mg per day of acetaminophen.     **If the pain you are having does not resolve, is severe, or is a flare of back pain you have had on other occasions prior to surgery, please contact your primary physician for further recommendations or for an appointment to be examined at their office.    Q:  Why am I having headaches?  A:  Headaches can be caused  by many things:  caffeine withdrawal, use of pain meds, dehydration, high blood pressure, lack of sleep, over-activity/exhaustion, flare-up of usual migraine headaches.  If you feel this is related to muscle tension (a band-like feeling around the head, or a pressure at the low-back of the head) you may try ice or heat to this area.  You may need to drink more fluids (try electrolyte drink like Gatorade), rest, or take your usual migraine medications.   **If your headaches do not resolve, worsen, are accompanied by other symptoms, or if your blood pressure is high, please call your primary physician for recommendation and/or examination.    Q:  I am unable to urinate.  What do I do?  A:  A small percentage of people can have difficulty urinating initially after surgery.  This includes being able to urinate only a very small amount at a time and feeling discomfort or pressure in the very low abdomen.  This is called  urinary retention , and is actually an urgent situation.  Proceed to your nearest Emergency department for evaluation (not an Urgent Care Center).  Sometimes the bladder does not work correctly after certain medications you receive during surgery, or related to certain procedures.  You may need to have a catheter placed until your bladder recovers.  When planning to go to an Emergency department, it may help to call the ER to let them know you are coming in for this problem after a surgery.  This may help you get in quicker to be evaluated.  **If you have symptoms of a urinary tract infection, please contact your primary physician for the proper evaluation and treatment.    If you have other questions, please call the office Monday thru Friday between 8am and 5pm to discuss with the nurse.  # [phone]    There is a surgeon ON CALL on weekday evenings and over the weekend in case of urgent need only, and may be contacted at the same number.    If you are having an emergency, call 911 or proceed to  your nearest emergency department.                          Same Day Surgery Discharge Instructions  Special Precautions After Surgery - Adult    1. It is not unusual to feel lightheaded or faint, up to 24 hours after surgery or while taking pain medication.  If you have these symptoms; sit for a few minutes before standing and have someone assist you when getting up.  2. You should rest and relax for the next 24 hours and must have someone stay with you for at least 24 hours after your discharge.  3. DO NOT DRIVE any vehicle or operate mechanical equipment for 24 hours following the end of your surgery.  DO NOT DRIVE while taking narcotic pain medications that have been prescribed by your physician.  If you had a limb operated on, you must be able to use it fully to drive.  4. DO NOT drink alcoholic beverages for 24 hours following surgery or while taking prescription pain medication.  5. Drink clear liquids (apple juice, ginger ale, broth, 7-Up, etc.).  Progress to your regular diet as you feel able.  6. Any questions call your physician and do not make important decisions for 24 hours.    ACTIVITY  ? Per above MD recommendations.     INCISIONAL CARE  ? Be alert for signs of infection:  redness, swelling, heat, drainage of pus, and/or elevated temperature.  Contact your doctor if these occur.   Follow MD incision care instructions, as listed above.     Medications:  ? Acetaminophen (Tylenol):  Next dose: 2:45PM.  ?  Oxycodone:  Next dose: as needed.  ? Ibuprofen (Motrin, Advil):  Next dose: 5:15PM.  ? Docusate sodium/colace: Stool softener. Start tonight to prevent constipation, if taking opiates (oxy).  ? Follow the instructions on the bottle.     __________________________________________________________________________________________________________________________________  IMPORTANT NUMBERS:    [facility] [place]  [phone], [phone]  Pharmacy:  [phone]  Same Day Surgery:  [phone], Monday -  Friday until 8:30 p.m.  Urgent Care:  [phone]  Emergency Room:  [phone]

## 2021-11-08 NOTE — ANESTHESIA PROCEDURE NOTES
Airway       Patient location during procedure: OR       Procedure Start/Stop Times: 11/8/2021 10:07 AM and 11/8/2021 10:07 AM  Staff -        Performed By: CRNA  Consent for Airway        Urgency: elective  Indications and Patient Condition       Indications for airway management: mary jo-procedural       Induction type:intravenous       Mask difficulty assessment: 1 - vent by mask    Final Airway Details       Final airway type: endotracheal airway       Successful airway: ETT - single and Oral  Endotracheal Airway Details        ETT size (mm): 7.5       Cuffed: yes       Cuff volume (mL): 8       Successful intubation technique: video laryngoscopy       VL Blade Size: Randle 3       Grade View of Cords: 1       Adjucts: stylet       Position: Right       Measured from: lips       Secured at (cm): 25       Bite block used: None    Post intubation assessment        Placement verified by: capnometry, equal breath sounds and chest rise        Number of attempts at approach: 1       Number of other approaches attempted: 0       Secured with: silk tape       Ease of procedure: easy       Dentition: Intact

## 2021-11-08 NOTE — ANESTHESIA CARE TRANSFER NOTE
Patient: Lance Hogue    Procedure: Procedure(s):  HERNIORRHAPHY, INGUINAL, LAPAROSCOPIC; BILATERAL, UMBILICAL HERNIA REPAIR  HERNIORRHAPHY, UMBILICAL, OPEN       Diagnosis: Right inguinal hernia [K40.90]  Umbilical hernia without obstruction and without gangrene [K42.9]  Diagnosis Additional Information: No value filed.    Anesthesia Type:   General     Note:    Oropharynx: oropharynx clear of all foreign objects  Level of Consciousness: drowsy  Oxygen Supplementation: face mask    Independent Airway: airway patency satisfactory and stable  Dentition: dentition unchanged  Vital Signs Stable: post-procedure vital signs reviewed and stable  Report to RN Given: handoff report given  Patient transferred to: PACU    Handoff Report: Identifed the Patient, Identified the Reponsible Provider, Reviewed the pertinent medical history, Discussed the surgical course, Reviewed Intra-OP anesthesia mangement and issues during anesthesia, Set expectations for post-procedure period and Allowed opportunity for questions and acknowledgement of understanding      Vitals:  Vitals Value Taken Time   BP     Temp     Pulse     Resp     SpO2         Electronically Signed By: FRANKIE Melendrez CRNA  November 8, 2021  11:47 AM

## 2021-11-23 ENCOUNTER — OFFICE VISIT (OUTPATIENT)
Dept: SURGERY | Facility: CLINIC | Age: 61
End: 2021-11-23
Payer: COMMERCIAL

## 2021-11-23 VITALS
TEMPERATURE: 98.4 F | DIASTOLIC BLOOD PRESSURE: 87 MMHG | HEART RATE: 76 BPM | SYSTOLIC BLOOD PRESSURE: 131 MMHG | HEIGHT: 72 IN | BODY MASS INDEX: 31.56 KG/M2 | WEIGHT: 233.03 LBS

## 2021-11-23 DIAGNOSIS — Z98.890 S/P LAPAROSCOPIC HERNIA REPAIR: Primary | ICD-10-CM

## 2021-11-23 DIAGNOSIS — Z87.19 S/P LAPAROSCOPIC HERNIA REPAIR: Primary | ICD-10-CM

## 2021-11-23 PROCEDURE — 99024 POSTOP FOLLOW-UP VISIT: CPT | Performed by: SURGERY

## 2021-11-23 ASSESSMENT — MIFFLIN-ST. JEOR: SCORE: 1910

## 2021-11-23 NOTE — PROGRESS NOTES
General Surgery Post Op    Pt returns for follow up visit s/p Umbilical and  Bilateral inguinal hernia repair on 11/8/21.    Patient has been doing well, tolerating diet. Bowels moving well. Pain controlled. No issues with wound healing/redness/drainage. No fevers.      Physical exam: Vitals: /87 (BP Location: Right arm, Patient Position: Sitting, Cuff Size: Adult Large)   Pulse 76   Temp 98.4  F (36.9  C) (Tympanic)   Ht 1.829 m (6')   Wt 105.7 kg (233 lb 0.4 oz)   BMI 31.60 kg/m    BMI= Body mass index is 31.6 kg/m .    Exam:  Constitutional: healthy, alert and no distress  Cardiovascular: negative  Respiratory: negative  Gastrointestinal: Abdomen soft, non-tender. BS normal. No masses, organomegaly  Incisions C/D/I.  No testicular swelling.  Minimal scrotal ecchymosis    Path:  none    Assessment:     ICD-10-CM    1. S/P laparoscopic hernia repair  Z98.890     Z87.19      Plan: Lance Hogue was seen for follow-up after laparoscopic bilateral inguinal hernia repair.  Patient is doing well and recovering without issue at this time.  We reviewed his surgical images We discussed routine post-operative care of wounds including avoiding sun exposure to wounds to limit scarring, no need for overlying ointments, and weight restrictions going forward.  Patient was instructed to call with any questions or concerns.  Lance Hogue can follow-up on an as needed basis.    Zay Bright, DO on 11/23/2021 at 7:33 AM

## 2021-11-23 NOTE — NURSING NOTE
Initial /87 (BP Location: Right arm, Patient Position: Sitting, Cuff Size: Adult Large)   Pulse 76   Temp 98.4  F (36.9  C) (Tympanic)   Ht 1.829 m (6')   Wt 105.7 kg (233 lb 0.4 oz)   BMI 31.60 kg/m   Estimated body mass index is 31.6 kg/m  as calculated from the following:    Height as of this encounter: 1.829 m (6').    Weight as of this encounter: 105.7 kg (233 lb 0.4 oz). .    Christine Sun MA

## 2021-11-23 NOTE — LETTER
11/23/2021         RE: Lance Hogue  [address]  [place] [address]        Dear Colleague,    Thank you for referring your patient, Lance Hogue, to the [facility]. Please see a copy of my visit note below.    General Surgery Post Op    Pt returns for follow up visit s/p Umbilical and  Bilateral inguinal hernia repair on 11/8/21.    Patient has been doing well, tolerating diet. Bowels moving well. Pain controlled. No issues with wound healing/redness/drainage. No fevers.      Physical exam: Vitals: /87 (BP Location: Right arm, Patient Position: Sitting, Cuff Size: Adult Large)   Pulse 76   Temp 98.4  F (36.9  C) (Tympanic)   Ht 1.829 m (6')   Wt 105.7 kg (233 lb 0.4 oz)   BMI 31.60 kg/m    BMI= Body mass index is 31.6 kg/m .    Exam:  Constitutional: healthy, alert and no distress  Cardiovascular: negative  Respiratory: negative  Gastrointestinal: Abdomen soft, non-tender. BS normal. No masses, organomegaly  Incisions C/D/I.  No testicular swelling.  Minimal scrotal ecchymosis    Path:  none    Assessment:     ICD-10-CM    1. S/P laparoscopic hernia repair  Z98.890     Z87.19      Plan: Lance Hogue was seen for follow-up after laparoscopic bilateral inguinal hernia repair.  Patient is doing well and recovering without issue at this time.  We reviewed his surgical images We discussed routine post-operative care of wounds including avoiding sun exposure to wounds to limit scarring, no need for overlying ointments, and weight restrictions going forward.  Patient was instructed to call with any questions or concerns.  Lance Hogue can follow-up on an as needed basis.    Zay Bright DO on 11/23/2021 at 7:33 AM          Again, thank you for allowing me to participate in the care of your patient.        Sincerely,        Zay Bright DO

## 2021-12-03 DIAGNOSIS — N52.03 COMBINED ARTERIAL INSUFFICIENCY AND CORPORO-VENOUS OCCLUSIVE ERECTILE DYSFUNCTION: ICD-10-CM

## 2021-12-05 RX ORDER — TADALAFIL 5 MG/1
TABLET ORAL
Qty: 30 TABLET | Refills: 0 | Status: SHIPPED | OUTPATIENT
Start: 2021-12-05 | End: 2022-03-03

## 2021-12-27 ENCOUNTER — OFFICE VISIT (OUTPATIENT)
Dept: ORTHOPEDICS | Facility: CLINIC | Age: 61
End: 2021-12-27
Payer: COMMERCIAL

## 2021-12-27 VITALS
HEART RATE: 73 BPM | OXYGEN SATURATION: 97 % | WEIGHT: 233 LBS | SYSTOLIC BLOOD PRESSURE: 150 MMHG | DIASTOLIC BLOOD PRESSURE: 88 MMHG | HEIGHT: 72 IN | BODY MASS INDEX: 31.56 KG/M2

## 2021-12-27 DIAGNOSIS — M17.11 PRIMARY OSTEOARTHRITIS OF RIGHT KNEE: Primary | ICD-10-CM

## 2021-12-27 DIAGNOSIS — M25.561 CHRONIC PAIN OF RIGHT KNEE: ICD-10-CM

## 2021-12-27 DIAGNOSIS — G89.29 CHRONIC PAIN OF RIGHT KNEE: ICD-10-CM

## 2021-12-27 PROCEDURE — 20610 DRAIN/INJ JOINT/BURSA W/O US: CPT | Mod: RT | Performed by: ORTHOPAEDIC SURGERY

## 2021-12-27 RX ORDER — METHYLPREDNISOLONE ACETATE 80 MG/ML
80 INJECTION, SUSPENSION INTRA-ARTICULAR; INTRALESIONAL; INTRAMUSCULAR; SOFT TISSUE
Status: DISCONTINUED | OUTPATIENT
Start: 2021-12-27 | End: 2022-08-17

## 2021-12-27 RX ORDER — BUPIVACAINE HYDROCHLORIDE 2.5 MG/ML
4 INJECTION, SOLUTION INFILTRATION; PERINEURAL
Status: DISCONTINUED | OUTPATIENT
Start: 2021-12-27 | End: 2022-08-17

## 2021-12-27 RX ADMIN — BUPIVACAINE HYDROCHLORIDE 4 ML: 2.5 INJECTION, SOLUTION INFILTRATION; PERINEURAL at 16:09

## 2021-12-27 RX ADMIN — METHYLPREDNISOLONE ACETATE 80 MG: 80 INJECTION, SUSPENSION INTRA-ARTICULAR; INTRALESIONAL; INTRAMUSCULAR; SOFT TISSUE at 16:09

## 2021-12-27 ASSESSMENT — MIFFLIN-ST. JEOR: SCORE: 1909.88

## 2021-12-27 ASSESSMENT — PAIN SCALES - GENERAL: PAINLEVEL: SEVERE PAIN (7)

## 2021-12-27 NOTE — PROGRESS NOTES
CHIEF COMPLAINT:   Chief Complaint   Patient presents with     Right Knee - Medication Injection     RECHECK     [facility] knee injection         HISTORY OF PRESENT ILLNESS    Lance Hogue is a 61 year old male seen for followup evaluation of ongoing right knee pain with no known injury. Last seen for injections 7/26/2021 and prior to that on 3/4/2021. Returns today stating the injection worked well up until last month or so. Had surgery last month for something else (hernia) so was told to hold off until healed.     He'd like another injection today. No problems with previous injections.    Pain has been present for ~4 years, or so, progressively getting worse. Pain is constant, day and night, gets worse as the day goes on. Aggravated with bumping the knee, catching the foot, prolonged weight bearing activities. Better with rest, elevation, pillow behind the knee. Treatment has been a brace at work, icy hot at night.    denies low back pain, denies numbness and tingling, denies hip pain.    Present symptoms: pain medially  and anteriorly, pain sharp, shooting, dull/achy , moderate pain, mild swelling.    Pain severity: 7/10  Frequency of symptoms: are constant  Exacerbating Factors: weight bearing, stairs down more than up, prolonged standing  Relieving Factors: rest, sitting  Night Pain: Yes  Pain while at rest: No   Numbness or tingling: No   Patient has tried:     NSAIDS: No      Physical Therapy: Yes      Activity modification: No      Bracing: Yes      Injections: Yes 7/2021, 3/4/2021, 10/2020      Ice: No      Assistive device:  No     Other: icy hot      Other PMH:  has a past medical history of Hyperlipidemia.  Patient Active Problem List   Diagnosis     Chronic pain of right knee     Primary osteoarthritis of right knee       Surgical Hx:  has a past surgical history that includes ENT surgery; Abdomen surgery; tonsillectomy; Laparoscopic herniorrhaphy inguinal (Bilateral, 11/8/2021); and Herniorrhaphy  umbilical (N/A, 11/8/2021).    Medications:   Current Outpatient Medications:      atorvastatin (LIPITOR) 40 MG tablet, Take 1 tablet (40 mg) by mouth daily, Disp: 90 tablet, Rfl: 3     docusate sodium (COLACE) 100 MG capsule, Take 1 capsule (100 mg) by mouth 2 times daily Take while on opiate to prevent constipation (Patient not taking: Reported on 11/23/2021), Disp: , Rfl: 0     fish oil-omega-3 fatty acids 1000 MG capsule, 1 cap(s), Disp: , Rfl:      Multiple Vitamin (MULTI VITAMIN) TABS, 1 tab(s), Disp: , Rfl:      niacin 250 MG CR capsule, 1 cap(s), Disp: , Rfl:      tadalafil (CIALIS) 5 MG tablet, TAKE ONE TABLET BY MOUTH EVERY 24 HOURS, Disp: 30 tablet, Rfl: 0    Current Facility-Administered Medications:      bupivacaine (MARCAINE) 0.25 % injection 4 mL, 4 mL, , , Flash Boggs MD, 4 mL at 07/26/21 1556     bupivacaine (MARCAINE) 0.25 % injection 4 mL, 4 mL, , , Flash Boggs MD, 4 mL at 03/04/21 1611     methylPREDNISolone (DEPO-MEDROL) injection 80 mg, 80 mg, , , Flash Boggs MD, 80 mg at 07/26/21 1556     methylPREDNISolone (DEPO-MEDROL) injection 80 mg, 80 mg, , , Flash Boggs MD, 80 mg at 03/04/21 1611    Allergies:   Allergies   Allergen Reactions     Chocolate Diarrhea and Cramps     Cocoa      Other reaction(s): Abdominal cramping       Social Hx: .   reports that he has never smoked. He has quit using smokeless tobacco.  His smokeless tobacco use included snuff. He reports current alcohol use. He reports that he does not use drugs.    Family Hx: family history includes Alcoholism in his brother; Alzheimer Disease in his mother; Cancer in his mother, paternal grandfather, and paternal grandmother; Other - See Comments in his father.    REVIEW OF SYSTEMS:   CONSTITUTIONAL:NEGATIVE for fever, chills, change in weight  INTEGUMENTARY/SKIN: NEGATIVE for worrisome rashes, moles or lesions  MUSCULOSKELETAL:See HPI above  NEURO: NEGATIVE for weakness, dizziness or  paresthesias    PHYSICAL EXAM:  BP (!) 150/88 (BP Location: Right arm, Patient Position: Sitting, Cuff Size: Adult Regular)   Pulse 73   Ht 1.829 m (6')   Wt 105.7 kg (233 lb)   SpO2 97%   BMI 31.60 kg/m     GENERAL APPEARANCE: healthy, alert, no distress; accompanied by his wife.  SKIN: no suspicious lesions or rashes  NEURO: Normal strength and tone, mentation intact and speech normal  PSYCH:  mentation appears normal and affect normal, not anxious  RESPIRATORY: No increased work of breathing.      BILATERAL LOWER EXTREMITIES:  Gait: normal  Alignment: varus  No gross deformities or masses.  No Quad atrophy, strength normal.  Intact sensation deep peroneal nerve, superficial peroneal nerve, med/lat tibial nerve, sural nerve, saphenous nerve  Intact EHL, EDL, TA, FHL, GS, quadriceps hamstrings and hip flexors  Toes warm and well perfused, brisk capillary refill. Palpable 2+ dp pulses.  Bilateral calf soft and nttp or squeeze.  Edema: trace      LEFT KNEE EXAM:    Skin: intact, no ecchymosis or erythema  Squat: 100 %, not limited by pain.     ROM: full extension to 125 flexion  Tight hamstrings on straight leg raise.  Effusion: none  Tender: NTTP med/lat joint line, anterior or posterior knee  McMurrays: negative    MCL: stable, and non-painful at both 0 and 30 degrees knee flexion  Varus stress: stable, and non-painful at both 0 and 30 degrees knee flexion  Lachmans: neg, firm endpoint  Posterior Drawer stable  Patellofemoral joint:                Apprehension: negative              Crepitations: minimal      RIGHT KNEE EXAM:    Skin: intact, no ecchymosis or erythema  Squat: 100 %, not limited by pain.   causes diffuse discomfort.  ROM: full extension to 115 flexion, anterior and medial discomfort. Medial crepitus with range of motion.  Tight hamstrings on straight leg raise.  Effusion: trace  Tender: severe medial joint line, posterior knee  NTTP lateral joint line.  McMurrays: positive medially.    MCL:  "stable, and non-painful at both 0 and 30 degrees knee flexion  Varus stress: stable, and non-painful at both 0 and 30 degrees knee flexion  Lachmans: neg, firm endpoint  Posterior Drawer stable  Patellofemoral joint:                Apprehension: negative              Crepitations: mild   Grind: positive.    X-RAY: no new images today. 3 views right knee from 10/28/2020 - no obvious fractures or dislocations. Moderate medial compartment narrowing with subchondral sclerosis, mild patello-femoral degenerative changes with marginal osteophytes.       ASSESSMENT/PLAN: Lance Hogue is a 61 year old male with chronic right knee pain, primary osteoarthritis.     * reviewed imaging studies with patient, showing arthritic changes, or wearing of the cartilage in the knee. This can be caused by normal \"wear and tear\" over the years or following prior injury to the knee.  * treatment usually nonsurgical to start, then can progress to surgical with total knee arthroplasty once nonsurgical management effective.  * suspect degenerative medial meniscus tear.    Non-surgical treatment for knee arthritis includes:    * rest, sitting  * Activity modification - avoid impact activities or activities that aggravate symptoms.  * NSAIDS (non-steroidal anti-inflammatory medications; e.g. Aleve, advil, motrin, ibuprofen) - regular use for inflammation ( twice daily or three times daily), with food, as long as no contra-indications Please discuss with primary care doctor if needed  * ice, 15-20 minutes at a time several times a day or as needed.  * Strengthening of quadriceps muscles  * Physical Therapy for strengthening, stretching and range of motion exercises of legs  * Tylenol as needed for pain, consider Tylenol arthritis or similar  * Weight loss: Weight loss:  Body mass index is 31.6 kg/m .. weight loss benefits, not only for the current pain symptoms, but also overall health. Recommend a good diet plan that works for the patient, " "with the assistance of a dietician or primary care doctor as needed. Also, a good, low-impact exercise program for at least 20 minutes per day, 3 times per week, such as exercise bike, elliptical , or pool.  * Exercise: low impact such as stationary bike, elliptical, pool.  * Injections: cortisone versus viscosupplementation (hyaluronic acid, \"rooster comb\", \"gel shots\"); risks and perceived benefits discussed today. Patient elects to proceed.  * Bracing: bracing the knee may offer some relief of symptoms when worn and provide some stability.  * over the counter supplements such as glucosamine and chondroitin sulfate may help with joint pain.  * topical ointments may help as well    * return to clinic as needed.      Flash Boggs M.D., M.S.  Dept. of Orthopaedic Surgery  [facility]    Large Joint Injection/Arthocentesis: R knee joint    Date/Time: 12/27/2021 4:09 PM  Performed by: Andrea Alvarez PA  Authorized by: Flash Boggs MD     Indications:  Pain and osteoarthritis  Needle Size:  22 G  Guidance: landmark guided    Approach:  Anteromedial  Location:  Knee      Medications:  80 mg methylPREDNISolone 80 MG/ML; 4 mL bupivacaine 0.25 %  Outcome:  Tolerated well, no immediate complications  Procedure discussed: discussed risks, benefits, and alternatives    Consent Given by:  Patient  Prep: patient was prepped and draped in usual sterile fashion          "

## 2021-12-27 NOTE — LETTER
12/27/2021         RE: Lance Hogue  [address]  [place] [address]        Dear Colleague,    Thank you for referring your patient, Lance Hogue, to the [facility]. Please see a copy of my visit note below.    CHIEF COMPLAINT:   Chief Complaint   Patient presents with     Right Knee - Medication Injection     RECHECK     riht knee injection         HISTORY OF PRESENT ILLNESS    Lance Hogue is a 61 year old male seen for followup evaluation of ongoing right knee pain with no known injury. Last seen for injections 7/26/2021 and prior to that on 3/4/2021. Returns today stating the injection worked well up until last month or so. Had surgery last month for something else (hernia) so was told to hold off until healed.     He'd like another injection today. No problems with previous injections.    Pain has been present for ~4 years, or so, progressively getting worse. Pain is constant, day and night, gets worse as the day goes on. Aggravated with bumping the knee, catching the foot, prolonged weight bearing activities. Better with rest, elevation, pillow behind the knee. Treatment has been a brace at work, icy hot at night.    denies low back pain, denies numbness and tingling, denies hip pain.    Present symptoms: pain medially  and anteriorly, pain sharp, shooting, dull/achy , moderate pain, mild swelling.    Pain severity: 7/10  Frequency of symptoms: are constant  Exacerbating Factors: weight bearing, stairs down more than up, prolonged standing  Relieving Factors: rest, sitting  Night Pain: Yes  Pain while at rest: No   Numbness or tingling: No   Patient has tried:     NSAIDS: No      Physical Therapy: Yes      Activity modification: No      Bracing: Yes      Injections: Yes 7/2021, 3/4/2021, 10/2020      Ice: No      Assistive device:  No     Other: icy hot      Other PMH:  has a past medical history of Hyperlipidemia.  Patient Active Problem List    Diagnosis     Chronic pain of right knee     Primary osteoarthritis of right knee       Surgical Hx:  has a past surgical history that includes ENT surgery; Abdomen surgery; tonsillectomy; Laparoscopic herniorrhaphy inguinal (Bilateral, 11/8/2021); and Herniorrhaphy umbilical (N/A, 11/8/2021).    Medications:   Current Outpatient Medications:      atorvastatin (LIPITOR) 40 MG tablet, Take 1 tablet (40 mg) by mouth daily, Disp: 90 tablet, Rfl: 3     docusate sodium (COLACE) 100 MG capsule, Take 1 capsule (100 mg) by mouth 2 times daily Take while on opiate to prevent constipation (Patient not taking: Reported on 11/23/2021), Disp: , Rfl: 0     fish oil-omega-3 fatty acids 1000 MG capsule, 1 cap(s), Disp: , Rfl:      Multiple Vitamin (MULTI VITAMIN) TABS, 1 tab(s), Disp: , Rfl:      niacin 250 MG CR capsule, 1 cap(s), Disp: , Rfl:      tadalafil (CIALIS) 5 MG tablet, TAKE ONE TABLET BY MOUTH EVERY 24 HOURS, Disp: 30 tablet, Rfl: 0    Current Facility-Administered Medications:      bupivacaine (MARCAINE) 0.25 % injection 4 mL, 4 mL, , , Flash Boggs MD, 4 mL at 07/26/21 1556     bupivacaine (MARCAINE) 0.25 % injection 4 mL, 4 mL, , , Flash Boggs MD, 4 mL at 03/04/21 1611     methylPREDNISolone (DEPO-MEDROL) injection 80 mg, 80 mg, , , Flash Boggs MD, 80 mg at 07/26/21 1556     methylPREDNISolone (DEPO-MEDROL) injection 80 mg, 80 mg, , , Flash Boggs MD, 80 mg at 03/04/21 1611    Allergies:   Allergies   Allergen Reactions     Chocolate Diarrhea and Cramps     Cocoa      Other reaction(s): Abdominal cramping       Social Hx: .   reports that he has never smoked. He has quit using smokeless tobacco.  His smokeless tobacco use included snuff. He reports current alcohol use. He reports that he does not use drugs.    Family Hx: family history includes Alcoholism in his brother; Alzheimer Disease in his mother; Cancer in his mother, paternal grandfather, and paternal grandmother;  Other - See Comments in his father.    REVIEW OF SYSTEMS:   CONSTITUTIONAL:NEGATIVE for fever, chills, change in weight  INTEGUMENTARY/SKIN: NEGATIVE for worrisome rashes, moles or lesions  MUSCULOSKELETAL:See HPI above  NEURO: NEGATIVE for weakness, dizziness or paresthesias    PHYSICAL EXAM:  BP (!) 150/88 (BP Location: Right arm, Patient Position: Sitting, Cuff Size: Adult Regular)   Pulse 73   Ht 1.829 m (6')   Wt 105.7 kg (233 lb)   SpO2 97%   BMI 31.60 kg/m     GENERAL APPEARANCE: healthy, alert, no distress; accompanied by his wife.  SKIN: no suspicious lesions or rashes  NEURO: Normal strength and tone, mentation intact and speech normal  PSYCH:  mentation appears normal and affect normal, not anxious  RESPIRATORY: No increased work of breathing.      BILATERAL LOWER EXTREMITIES:  Gait: normal  Alignment: varus  No gross deformities or masses.  No Quad atrophy, strength normal.  Intact sensation deep peroneal nerve, superficial peroneal nerve, med/lat tibial nerve, sural nerve, saphenous nerve  Intact EHL, EDL, TA, FHL, GS, quadriceps hamstrings and hip flexors  Toes warm and well perfused, brisk capillary refill. Palpable 2+ dp pulses.  Bilateral calf soft and nttp or squeeze.  Edema: trace      LEFT KNEE EXAM:    Skin: intact, no ecchymosis or erythema  Squat: 100 %, not limited by pain.     ROM: full extension to 125 flexion  Tight hamstrings on straight leg raise.  Effusion: none  Tender: NTTP med/lat joint line, anterior or posterior knee  McMurrays: negative    MCL: stable, and non-painful at both 0 and 30 degrees knee flexion  Varus stress: stable, and non-painful at both 0 and 30 degrees knee flexion  Lachmans: neg, firm endpoint  Posterior Drawer stable  Patellofemoral joint:                Apprehension: negative              Crepitations: minimal      RIGHT KNEE EXAM:    Skin: intact, no ecchymosis or erythema  Squat: 100 %, not limited by pain.   causes diffuse discomfort.  ROM: full  "extension to 115 flexion, anterior and medial discomfort. Medial crepitus with range of motion.  Tight hamstrings on straight leg raise.  Effusion: trace  Tender: severe medial joint line, posterior knee  NTTP lateral joint line.  McMurrays: positive medially.    MCL: stable, and non-painful at both 0 and 30 degrees knee flexion  Varus stress: stable, and non-painful at both 0 and 30 degrees knee flexion  Lachmans: neg, firm endpoint  Posterior Drawer stable  Patellofemoral joint:                Apprehension: negative              Crepitations: mild   Grind: positive.    X-RAY: no new images today. 3 views right knee from 10/28/2020 - no obvious fractures or dislocations. Moderate medial compartment narrowing with subchondral sclerosis, mild patello-femoral degenerative changes with marginal osteophytes.       ASSESSMENT/PLAN: Lance Hogue is a 61 year old male with chronic right knee pain, primary osteoarthritis.     * reviewed imaging studies with patient, showing arthritic changes, or wearing of the cartilage in the knee. This can be caused by normal \"wear and tear\" over the years or following prior injury to the knee.  * treatment usually nonsurgical to start, then can progress to surgical with total knee arthroplasty once nonsurgical management effective.  * suspect degenerative medial meniscus tear.    Non-surgical treatment for knee arthritis includes:    * rest, sitting  * Activity modification - avoid impact activities or activities that aggravate symptoms.  * NSAIDS (non-steroidal anti-inflammatory medications; e.g. Aleve, advil, motrin, ibuprofen) - regular use for inflammation ( twice daily or three times daily), with food, as long as no contra-indications Please discuss with primary care doctor if needed  * ice, 15-20 minutes at a time several times a day or as needed.  * Strengthening of quadriceps muscles  * Physical Therapy for strengthening, stretching and range of motion exercises of legs  * " "Tylenol as needed for pain, consider Tylenol arthritis or similar  * Weight loss: Weight loss:  Body mass index is 31.6 kg/m .. weight loss benefits, not only for the current pain symptoms, but also overall health. Recommend a good diet plan that works for the patient, with the assistance of a dietician or primary care doctor as needed. Also, a good, low-impact exercise program for at least 20 minutes per day, 3 times per week, such as exercise bike, elliptical , or pool.  * Exercise: low impact such as stationary bike, elliptical, pool.  * Injections: cortisone versus viscosupplementation (hyaluronic acid, \"rooster comb\", \"gel shots\"); risks and perceived benefits discussed today. Patient elects to proceed.  * Bracing: bracing the knee may offer some relief of symptoms when worn and provide some stability.  * over the counter supplements such as glucosamine and chondroitin sulfate may help with joint pain.  * topical ointments may help as well    * return to clinic as needed.      Flash Boggs M.D., M.S.  Dept. of Orthopaedic Surgery  [facility]    Large Joint Injection/Arthocentesis: R knee joint    Date/Time: 12/27/2021 4:09 PM  Performed by: Andrea Alvarez PA  Authorized by: Flash Boggs MD     Indications:  Pain and osteoarthritis  Needle Size:  22 G  Guidance: landmark guided    Approach:  Anteromedial  Location:  Knee      Medications:  80 mg methylPREDNISolone 80 MG/ML; 4 mL bupivacaine 0.25 %  Outcome:  Tolerated well, no immediate complications  Procedure discussed: discussed risks, benefits, and alternatives    Consent Given by:  Patient  Prep: patient was prepped and draped in usual sterile fashion              Again, thank you for allowing me to participate in the care of your patient.        Sincerely,        Flash Boggs MD    "

## 2022-03-03 DIAGNOSIS — N52.03 COMBINED ARTERIAL INSUFFICIENCY AND CORPORO-VENOUS OCCLUSIVE ERECTILE DYSFUNCTION: ICD-10-CM

## 2022-03-03 RX ORDER — TADALAFIL 5 MG/1
TABLET ORAL
Qty: 30 TABLET | Refills: 0 | Status: SHIPPED | OUTPATIENT
Start: 2022-03-03 | End: 2022-05-09

## 2022-03-03 NOTE — TELEPHONE ENCOUNTER
Prescription approved per [facility] Refill Protocol.    Mandeep Madison RN

## 2022-04-04 ENCOUNTER — NURSE TRIAGE (OUTPATIENT)
Dept: NURSING | Facility: CLINIC | Age: 62
End: 2022-04-04
Payer: COMMERCIAL

## 2022-04-04 NOTE — TELEPHONE ENCOUNTER
"Call transferred from scheduling; patient seeking orthopedic appointment at  [facility]; sttes  [facility]  \" couldn't open  those appointments\" so sent to nurse line   Call transferred to  Ortho appointments @   [phone]  Estefanía Parks RN  FNA       Additional Information    Information only question and nurse able to answer    Protocols used: INFORMATION ONLY CALL - NO TRIAGE-A-OH      "

## 2022-04-11 ENCOUNTER — OFFICE VISIT (OUTPATIENT)
Dept: ORTHOPEDICS | Facility: CLINIC | Age: 62
End: 2022-04-11
Payer: COMMERCIAL

## 2022-04-11 VITALS
OXYGEN SATURATION: 96 % | DIASTOLIC BLOOD PRESSURE: 83 MMHG | HEART RATE: 72 BPM | RESPIRATION RATE: 16 BRPM | SYSTOLIC BLOOD PRESSURE: 137 MMHG | HEIGHT: 72 IN | WEIGHT: 246 LBS | BODY MASS INDEX: 33.32 KG/M2

## 2022-04-11 DIAGNOSIS — G89.29 CHRONIC PAIN OF RIGHT KNEE: ICD-10-CM

## 2022-04-11 DIAGNOSIS — M25.561 CHRONIC PAIN OF RIGHT KNEE: ICD-10-CM

## 2022-04-11 DIAGNOSIS — M17.11 PRIMARY OSTEOARTHRITIS OF RIGHT KNEE: Primary | ICD-10-CM

## 2022-04-11 PROCEDURE — 20610 DRAIN/INJ JOINT/BURSA W/O US: CPT | Mod: RT | Performed by: ORTHOPAEDIC SURGERY

## 2022-04-11 RX ORDER — METHYLPREDNISOLONE ACETATE 80 MG/ML
80 INJECTION, SUSPENSION INTRA-ARTICULAR; INTRALESIONAL; INTRAMUSCULAR; SOFT TISSUE
Status: DISCONTINUED | OUTPATIENT
Start: 2022-04-11 | End: 2022-08-17

## 2022-04-11 RX ORDER — BUPIVACAINE HYDROCHLORIDE 2.5 MG/ML
4 INJECTION, SOLUTION INFILTRATION; PERINEURAL
Status: DISCONTINUED | OUTPATIENT
Start: 2022-04-11 | End: 2022-08-17

## 2022-04-11 RX ADMIN — BUPIVACAINE HYDROCHLORIDE 4 ML: 2.5 INJECTION, SOLUTION INFILTRATION; PERINEURAL at 16:10

## 2022-04-11 RX ADMIN — METHYLPREDNISOLONE ACETATE 80 MG: 80 INJECTION, SUSPENSION INTRA-ARTICULAR; INTRALESIONAL; INTRAMUSCULAR; SOFT TISSUE at 16:10

## 2022-04-11 ASSESSMENT — PAIN SCALES - GENERAL: PAINLEVEL: NO PAIN (0)

## 2022-04-11 NOTE — PROGRESS NOTES
CHIEF COMPLAINT:   Chief Complaint   Patient presents with     RECHECK     Follow-up ongoing right knee pain with no known injury. Last seen for injections 12/27/2021 and helped for about 3 months.  He like another injection today. No problems with last injections.         HISTORY OF PRESENT ILLNESS    Lance Hogue is a 61 year old male seen for followup evaluation of ongoing right knee pain with no known injury. Last seen for injections 12/27/2021, 7/26/2021 and prior to that on 3/4/2021. Returns today stating the injection worked well up until last month or so. He'd like another injection today.      Pain has been present for ~4 years, or so, progressively getting worse. Pain is constant, day and night, gets worse as the day goes on. Aggravated with bumping the knee, catching the foot, prolonged weight bearing activities. Better with rest, elevation, pillow behind the knee. Treatment has been a brace at work, icy hot at night.    denies low back pain, denies numbness and tingling, denies hip pain.    Present symptoms: pain medially  and anteriorly, pain sharp, shooting, dull/achy , moderate pain, mild swelling.    Pain severity: 0/10  Frequency of symptoms: are constant  Exacerbating Factors: weight bearing, stairs down more than up, prolonged standing  Relieving Factors: rest, sitting  Night Pain: Yes  Pain while at rest: No   Numbness or tingling: No   Patient has tried:     NSAIDS: No      Physical Therapy: Yes      Activity modification: No      Bracing: Yes      Injections: Yes  12/2021, 7/2021, 3/4/2021, 10/2020      Ice: No      Assistive device:  No     Other: icy hot      Other PMH:  has a past medical history of Hyperlipidemia.  Patient Active Problem List   Diagnosis     Chronic pain of right knee     Primary osteoarthritis of right knee       Surgical Hx:  has a past surgical history that includes ENT surgery; Abdomen surgery; tonsillectomy; Laparoscopic herniorrhaphy inguinal (Bilateral,  11/8/2021); and Herniorrhaphy umbilical (N/A, 11/8/2021).    Medications:   Current Outpatient Medications:      atorvastatin (LIPITOR) 40 MG tablet, Take 1 tablet (40 mg) by mouth daily, Disp: 90 tablet, Rfl: 3     docusate sodium (COLACE) 100 MG capsule, Take 1 capsule (100 mg) by mouth 2 times daily Take while on opiate to prevent constipation, Disp: , Rfl: 0     fish oil-omega-3 fatty acids 1000 MG capsule, 1 cap(s), Disp: , Rfl:      Multiple Vitamin (MULTI VITAMIN) TABS, 1 tab(s), Disp: , Rfl:      niacin 250 MG CR capsule, 1 cap(s), Disp: , Rfl:      tadalafil (CIALIS) 5 MG tablet, TAKE ONE TABLET BY MOUTH EVERY 24 HOURS, Disp: 30 tablet, Rfl: 0    Current Facility-Administered Medications:      bupivacaine (MARCAINE) 0.25 % injection 4 mL, 4 mL, , , Flash Boggs MD, 4 mL at 12/27/21 1609     bupivacaine (MARCAINE) 0.25 % injection 4 mL, 4 mL, , , Flash Boggs MD, 4 mL at 07/26/21 1556     bupivacaine (MARCAINE) 0.25 % injection 4 mL, 4 mL, , , Flash Boggs MD, 4 mL at 03/04/21 1611     methylPREDNISolone (DEPO-MEDROL) injection 80 mg, 80 mg, , , Flash Boggs MD, 80 mg at 12/27/21 1609     methylPREDNISolone (DEPO-MEDROL) injection 80 mg, 80 mg, , , Flash Boggs MD, 80 mg at 07/26/21 1556     methylPREDNISolone (DEPO-MEDROL) injection 80 mg, 80 mg, , , Flash Boggs MD, 80 mg at 03/04/21 1611    Allergies:   Allergies   Allergen Reactions     Chocolate Diarrhea and Cramps     Cocoa      Other reaction(s): Abdominal cramping       Social Hx: .   reports that he has never smoked. He has quit using smokeless tobacco.  His smokeless tobacco use included snuff. He reports current alcohol use. He reports that he does not use drugs.    Family Hx: family history includes Alcoholism in his brother; Alzheimer Disease in his mother; Cancer in his mother, paternal grandfather, and paternal grandmother; Other - See Comments in his father.    REVIEW OF SYSTEMS:    CONSTITUTIONAL:NEGATIVE for fever, chills, change in weight  INTEGUMENTARY/SKIN: NEGATIVE for worrisome rashes, moles or lesions  MUSCULOSKELETAL:See HPI above  NEURO: NEGATIVE for weakness, dizziness or paresthesias    PHYSICAL EXAM:  /83   Pulse 72   Resp 16   Ht 1.829 m (6')   Wt 111.6 kg (246 lb)   SpO2 96%   BMI 33.36 kg/m     GENERAL APPEARANCE: healthy, alert, no distress  SKIN: no suspicious lesions or rashes  NEURO: Normal strength and tone, mentation intact and speech normal  PSYCH:  mentation appears normal and affect normal, not anxious  RESPIRATORY: No increased work of breathing.      BILATERAL LOWER EXTREMITIES:  Gait: normal  Alignment: varus  No Quad atrophy, strength normal.  calf soft and nttp or squeeze.  Edema: trace      LEFT KNEE EXAM:    Skin: intact, no ecchymosis or erythema  ROM: full extension to 125 flexion  Tight hamstrings on straight leg raise.  Effusion: none  Tender: NTTP med/lat joint line, anterior or posterior knee  McMurrays: negative    MCL: stable, and non-painful at both 0 and 30 degrees knee flexion  Varus stress: stable, and non-painful at both 0 and 30 degrees knee flexion  Lachmans: neg, firm endpoint  Posterior Drawer stable  Patellofemoral joint:                Apprehension: negative              Crepitations: minimal      RIGHT KNEE EXAM:    Skin: intact, no ecchymosis or erythema  Squat: 100 %, not limited by pain.   causes diffuse discomfort.  ROM: full extension to 115 flexion, anterior and medial discomfort. Medial crepitus with range of motion.  Effusion: trace  Tender: severe medial joint line, posterior knee  NTTP lateral joint line.  McMurrays: positive medially.    MCL: stable, and non-painful at both 0 and 30 degrees knee flexion  Varus stress: stable, and non-painful at both 0 and 30 degrees knee flexion  Lachmans: neg, firm endpoint  Posterior Drawer stable  Patellofemoral joint:                Apprehension: negative              Crepitations:  "mild   Grind: positive.    X-RAY: no new images today. 3 views right knee from 10/28/2020 - no obvious fractures or dislocations. Moderate medial compartment narrowing with subchondral sclerosis, mild patello-femoral degenerative changes with marginal osteophytes.       ASSESSMENT/PLAN: Lance Hogue is a 61 year old male with chronic right knee pain, primary osteoarthritis.     * reviewed imaging studies with patient, showing arthritic changes, or wearing of the cartilage in the knee. This can be caused by normal \"wear and tear\" over the years or following prior injury to the knee.  * treatment usually nonsurgical to start, then can progress to surgical with total knee arthroplasty once nonsurgical management effective.    Non-surgical treatment for knee arthritis includes:    * rest, sitting  * Activity modification - avoid impact activities or activities that aggravate symptoms.  * NSAIDS (non-steroidal anti-inflammatory medications; e.g. Aleve, advil, motrin, ibuprofen) - regular use for inflammation ( twice daily or three times daily), with food, as long as no contra-indications Please discuss with primary care doctor if needed  * ice, 15-20 minutes at a time several times a day or as needed.  * Strengthening of quadriceps muscles  * Physical Therapy for strengthening, stretching and range of motion exercises of legs  * Tylenol as needed for pain, consider Tylenol arthritis or similar  * Weight loss: Weight loss:  Body mass index is 33.36 kg/m .. weight loss benefits, not only for the current pain symptoms, but also overall health. Recommend a good diet plan that works for the patient, with the assistance of a dietician or primary care doctor as needed. Also, a good, low-impact exercise program for at least 20 minutes per day, 3 times per week, such as exercise bike, elliptical , or pool.  * Exercise: low impact such as stationary bike, elliptical, pool.  * Injections: cortisone versus " "viscosupplementation (hyaluronic acid, \"rooster comb\", \"gel shots\"); risks and perceived benefits discussed today. Patient elects to proceed.  * Bracing: bracing the knee may offer some relief of symptoms when worn and provide some stability.  * over the counter supplements such as glucosamine and chondroitin sulfate may help with joint pain.  * topical ointments may help as well    * return to clinic as needed.      Flash Boggs M.D., M.S.  Dept. of Orthopaedic Surgery  [facility]    Large Joint Injection/Arthocentesis: R knee joint    Date/Time: 4/11/2022 4:10 PM  Performed by: Flash Boggs MD  Authorized by: Flash Boggs MD     Indications:  Pain  Needle Size:  22 G  Guidance: landmark guided    Approach:  Anteromedial  Location:  Knee      Medications:  80 mg methylPREDNISolone 80 MG/ML; 4 mL bupivacaine 0.25 %  Outcome:  Tolerated well, no immediate complications  Procedure discussed: discussed risks, benefits, and alternatives    Consent Given by:  Patient  Timeout: timeout called immediately prior to procedure    Prep: patient was prepped and draped in usual sterile fashion          "

## 2022-04-11 NOTE — LETTER
4/11/2022         RE: Lance Hogue  [address]  [facility] [address]        Dear Colleague,    Thank you for referring your patient, Lance Hogue, to the [facility] [place]. Please see a copy of my visit note below.    CHIEF COMPLAINT:   Chief Complaint   Patient presents with     RECHECK     Follow-up ongoing right knee pain with no known injury. Last seen for injections 12/27/2021 and helped for about 3 months.  He like another injection today. No problems with last injections.         HISTORY OF PRESENT ILLNESS    Lance Hogue is a 61 year old male seen for followup evaluation of ongoing right knee pain with no known injury. Last seen for injections 12/27/2021, 7/26/2021 and prior to that on 3/4/2021. Returns today stating the injection worked well up until last month or so. He'd like another injection today.      Pain has been present for ~4 years, or so, progressively getting worse. Pain is constant, day and night, gets worse as the day goes on. Aggravated with bumping the knee, catching the foot, prolonged weight bearing activities. Better with rest, elevation, pillow behind the knee. Treatment has been a brace at work, icy hot at night.    denies low back pain, denies numbness and tingling, denies hip pain.    Present symptoms: pain medially  and anteriorly, pain sharp, shooting, dull/achy , moderate pain, mild swelling.    Pain severity: 0/10  Frequency of symptoms: are constant  Exacerbating Factors: weight bearing, stairs down more than up, prolonged standing  Relieving Factors: rest, sitting  Night Pain: Yes  Pain while at rest: No   Numbness or tingling: No   Patient has tried:     NSAIDS: No      Physical Therapy: Yes      Activity modification: No      Bracing: Yes      Injections: Yes  12/2021, 7/2021, 3/4/2021, 10/2020      Ice: No      Assistive device:  No     Other: icy hot      Other PMH:  has a past medical history of Hyperlipidemia.  Patient  Active Problem List   Diagnosis     Chronic pain of right knee     Primary osteoarthritis of right knee       Surgical Hx:  has a past surgical history that includes ENT surgery; Abdomen surgery; tonsillectomy; Laparoscopic herniorrhaphy inguinal (Bilateral, 11/8/2021); and Herniorrhaphy umbilical (N/A, 11/8/2021).    Medications:   Current Outpatient Medications:      atorvastatin (LIPITOR) 40 MG tablet, Take 1 tablet (40 mg) by mouth daily, Disp: 90 tablet, Rfl: 3     docusate sodium (COLACE) 100 MG capsule, Take 1 capsule (100 mg) by mouth 2 times daily Take while on opiate to prevent constipation, Disp: , Rfl: 0     fish oil-omega-3 fatty acids 1000 MG capsule, 1 cap(s), Disp: , Rfl:      Multiple Vitamin (MULTI VITAMIN) TABS, 1 tab(s), Disp: , Rfl:      niacin 250 MG CR capsule, 1 cap(s), Disp: , Rfl:      tadalafil (CIALIS) 5 MG tablet, TAKE ONE TABLET BY MOUTH EVERY 24 HOURS, Disp: 30 tablet, Rfl: 0    Current Facility-Administered Medications:      bupivacaine (MARCAINE) 0.25 % injection 4 mL, 4 mL, , , Flash Boggs MD, 4 mL at 12/27/21 1609     bupivacaine (MARCAINE) 0.25 % injection 4 mL, 4 mL, , , Flash Boggs MD, 4 mL at 07/26/21 1556     bupivacaine (MARCAINE) 0.25 % injection 4 mL, 4 mL, , , Flash Boggs MD, 4 mL at 03/04/21 1611     methylPREDNISolone (DEPO-MEDROL) injection 80 mg, 80 mg, , , Flash Boggs MD, 80 mg at 12/27/21 1609     methylPREDNISolone (DEPO-MEDROL) injection 80 mg, 80 mg, , , Flash Boggs MD, 80 mg at 07/26/21 1556     methylPREDNISolone (DEPO-MEDROL) injection 80 mg, 80 mg, , , Flash Boggs MD, 80 mg at 03/04/21 1611    Allergies:   Allergies   Allergen Reactions     Chocolate Diarrhea and Cramps     Cocoa      Other reaction(s): Abdominal cramping       Social Hx: .   reports that he has never smoked. He has quit using smokeless tobacco.  His smokeless tobacco use included snuff. He reports current alcohol use. He reports that  he does not use drugs.    Family Hx: family history includes Alcoholism in his brother; Alzheimer Disease in his mother; Cancer in his mother, paternal grandfather, and paternal grandmother; Other - See Comments in his father.    REVIEW OF SYSTEMS:   CONSTITUTIONAL:NEGATIVE for fever, chills, change in weight  INTEGUMENTARY/SKIN: NEGATIVE for worrisome rashes, moles or lesions  MUSCULOSKELETAL:See HPI above  NEURO: NEGATIVE for weakness, dizziness or paresthesias    PHYSICAL EXAM:  /83   Pulse 72   Resp 16   Ht 1.829 m (6')   Wt 111.6 kg (246 lb)   SpO2 96%   BMI 33.36 kg/m     GENERAL APPEARANCE: healthy, alert, no distress  SKIN: no suspicious lesions or rashes  NEURO: Normal strength and tone, mentation intact and speech normal  PSYCH:  mentation appears normal and affect normal, not anxious  RESPIRATORY: No increased work of breathing.      BILATERAL LOWER EXTREMITIES:  Gait: normal  Alignment: varus  No Quad atrophy, strength normal.  calf soft and nttp or squeeze.  Edema: trace      LEFT KNEE EXAM:    Skin: intact, no ecchymosis or erythema  ROM: full extension to 125 flexion  Tight hamstrings on straight leg raise.  Effusion: none  Tender: NTTP med/lat joint line, anterior or posterior knee  McMurrays: negative    MCL: stable, and non-painful at both 0 and 30 degrees knee flexion  Varus stress: stable, and non-painful at both 0 and 30 degrees knee flexion  Lachmans: neg, firm endpoint  Posterior Drawer stable  Patellofemoral joint:                Apprehension: negative              Crepitations: minimal      RIGHT KNEE EXAM:    Skin: intact, no ecchymosis or erythema  Squat: 100 %, not limited by pain.   causes diffuse discomfort.  ROM: full extension to 115 flexion, anterior and medial discomfort. Medial crepitus with range of motion.  Effusion: trace  Tender: severe medial joint line, posterior knee  NTTP lateral joint line.  McMurrays: positive medially.    MCL: stable, and non-painful at both  "0 and 30 degrees knee flexion  Varus stress: stable, and non-painful at both 0 and 30 degrees knee flexion  Lachmans: neg, firm endpoint  Posterior Drawer stable  Patellofemoral joint:                Apprehension: negative              Crepitations: mild   Grind: positive.    X-RAY: no new images today. 3 views right knee from 10/28/2020 - no obvious fractures or dislocations. Moderate medial compartment narrowing with subchondral sclerosis, mild patello-femoral degenerative changes with marginal osteophytes.       ASSESSMENT/PLAN: Lance Hogue is a 61 year old male with chronic right knee pain, primary osteoarthritis.     * reviewed imaging studies with patient, showing arthritic changes, or wearing of the cartilage in the knee. This can be caused by normal \"wear and tear\" over the years or following prior injury to the knee.  * treatment usually nonsurgical to start, then can progress to surgical with total knee arthroplasty once nonsurgical management effective.    Non-surgical treatment for knee arthritis includes:    * rest, sitting  * Activity modification - avoid impact activities or activities that aggravate symptoms.  * NSAIDS (non-steroidal anti-inflammatory medications; e.g. Aleve, advil, motrin, ibuprofen) - regular use for inflammation ( twice daily or three times daily), with food, as long as no contra-indications Please discuss with primary care doctor if needed  * ice, 15-20 minutes at a time several times a day or as needed.  * Strengthening of quadriceps muscles  * Physical Therapy for strengthening, stretching and range of motion exercises of legs  * Tylenol as needed for pain, consider Tylenol arthritis or similar  * Weight loss: Weight loss:  Body mass index is 33.36 kg/m .. weight loss benefits, not only for the current pain symptoms, but also overall health. Recommend a good diet plan that works for the patient, with the assistance of a dietician or primary care doctor as needed. Also, a " "good, low-impact exercise program for at least 20 minutes per day, 3 times per week, such as exercise bike, elliptical , or pool.  * Exercise: low impact such as stationary bike, elliptical, pool.  * Injections: cortisone versus viscosupplementation (hyaluronic acid, \"rooster comb\", \"gel shots\"); risks and perceived benefits discussed today. Patient elects to proceed.  * Bracing: bracing the knee may offer some relief of symptoms when worn and provide some stability.  * over the counter supplements such as glucosamine and chondroitin sulfate may help with joint pain.  * topical ointments may help as well    * return to clinic as needed.      Flash Boggs M.D., M.S.  Dept. of Orthopaedic Surgery  [facility]    Large Joint Injection/Arthocentesis: R knee joint    Date/Time: 4/11/2022 4:10 PM  Performed by: Flash Boggs MD  Authorized by: Flash Boggs MD     Indications:  Pain  Needle Size:  22 G  Guidance: landmark guided    Approach:  Anteromedial  Location:  Knee      Medications:  80 mg methylPREDNISolone 80 MG/ML; 4 mL bupivacaine 0.25 %  Outcome:  Tolerated well, no immediate complications  Procedure discussed: discussed risks, benefits, and alternatives    Consent Given by:  Patient  Timeout: timeout called immediately prior to procedure    Prep: patient was prepped and draped in usual sterile fashion              Again, thank you for allowing me to participate in the care of your patient.        Sincerely,        Flash Boggs MD    "

## 2022-05-09 DIAGNOSIS — N52.03 COMBINED ARTERIAL INSUFFICIENCY AND CORPORO-VENOUS OCCLUSIVE ERECTILE DYSFUNCTION: ICD-10-CM

## 2022-05-09 RX ORDER — TADALAFIL 5 MG/1
TABLET ORAL
Qty: 30 TABLET | Refills: 0 | Status: SHIPPED | OUTPATIENT
Start: 2022-05-09 | End: 2022-08-22

## 2022-08-16 NOTE — PROGRESS NOTES
CHIEF COMPLAINT:   Chief Complaint   Patient presents with     Right Knee - Pain, Follow Up     Right knee pain - last injection on 4/11/22 provided relief for 3.5 months. He would like to do a repeat injection today.          HISTORY OF PRESENT ILLNESS    Lance Hogue is a 61 year old male seen for followup evaluation of ongoing right knee pain with no known injury. Last seen for injections 4/11/2022,  7/26/2021 and prior to that on 3/4/2021. Returns today stating the injection worked well fo 3.5 months or so. He'd like another injection today.      Pain has been present for ~4 years, or so, progressively getting worse. Pain is constant, day and night, gets worse as the day goes on. Aggravated with bumping the knee, catching the foot, prolonged weight bearing activities. Better with rest, elevation, pillow behind the knee. Treatment has been a brace at work, icy hot at night.    denies low back pain, denies numbness and tingling, denies hip pain.    Present symptoms: pain medially  and anteriorly, pain sharp, shooting, dull/achy , moderate pain, mild swelling.    Pain severity: 8/10  Frequency of symptoms: are constant  Exacerbating Factors: weight bearing, stairs down more than up, prolonged standing  Relieving Factors: rest, sitting  Night Pain: Yes  Pain while at rest: No   Numbness or tingling: No   Patient has tried:     NSAIDS: No      Physical Therapy: Yes      Activity modification: No      Bracing: Yes      Injections: Yes  12/2021, 7/2021, 3/4/2021, 10/2020      Ice: No      Assistive device:  No     Other: icy hot      Other PMH:  has a past medical history of Hyperlipidemia.  Patient Active Problem List   Diagnosis     Chronic pain of right knee     Primary osteoarthritis of right knee       Surgical Hx:  has a past surgical history that includes ENT surgery; Abdomen surgery; tonsillectomy; Laparoscopic herniorrhaphy inguinal (Bilateral, 11/8/2021); and Herniorrhaphy umbilical (N/A,  "11/8/2021).    Medications:   Current Outpatient Medications:      atorvastatin (LIPITOR) 40 MG tablet, Take 1 tablet (40 mg) by mouth daily, Disp: 90 tablet, Rfl: 3     docusate sodium (COLACE) 100 MG capsule, Take 1 capsule (100 mg) by mouth 2 times daily Take while on opiate to prevent constipation, Disp: , Rfl: 0     fish oil-omega-3 fatty acids 1000 MG capsule, 1 cap(s), Disp: , Rfl:      Multiple Vitamin (MULTI VITAMIN) TABS, 1 tab(s), Disp: , Rfl:      niacin 250 MG CR capsule, 1 cap(s), Disp: , Rfl:      tadalafil (CIALIS) 5 MG tablet, TAKE ONE TABLET BY MOUTH EVERY 24 HOURS, Disp: 30 tablet, Rfl: 0    Allergies:   Allergies   Allergen Reactions     Chocolate Diarrhea and Cramps     Cocoa      Other reaction(s): Abdominal cramping       Social Hx: .   reports that he has never smoked. He has quit using smokeless tobacco.  His smokeless tobacco use included snuff. He reports current alcohol use. He reports that he does not use drugs.    Family Hx: family history includes Alcoholism in his brother; Alzheimer Disease in his mother; Cancer in his mother, paternal grandfather, and paternal grandmother; Other - See Comments in his father.    REVIEW OF SYSTEMS:   CONSTITUTIONAL:NEGATIVE for fever, chills, change in weight  INTEGUMENTARY/SKIN: NEGATIVE for worrisome rashes, moles or lesions  MUSCULOSKELETAL:See HPI above  NEURO: NEGATIVE for weakness, dizziness or paresthesias    PHYSICAL EXAM:  BP (!) 162/100   Pulse 73   Ht 1.854 m (6' 1\")   Wt 111.6 kg (246 lb)   BMI 32.46 kg/m     GENERAL APPEARANCE: healthy, alert, no distress  SKIN: no suspicious lesions or rashes  NEURO: Normal strength and tone, mentation intact and speech normal  PSYCH:  mentation appears normal and affect normal, not anxious  RESPIRATORY: No increased work of breathing.      BILATERAL LOWER EXTREMITIES:  Gait: normal  Alignment: varus  No Quad atrophy, strength normal.  calf soft and nttp or squeeze.  Edema: " "trace      LEFT KNEE EXAM:    Skin: intact, no ecchymosis or erythema  ROM: full extension to 125 flexion  Tight hamstrings on straight leg raise.  Effusion: none  Tender: NTTP med/lat joint line, anterior or posterior knee  McMurrays: negative    MCL: stable, and non-painful at both 0 and 30 degrees knee flexion  Varus stress: stable, and non-painful at both 0 and 30 degrees knee flexion  Lachmans: neg, firm endpoint  Posterior Drawer stable  Patellofemoral joint:                Apprehension: negative              Crepitations: minimal      RIGHT KNEE EXAM:    Skin: intact, no ecchymosis or erythema  Squat: 100 %, not limited by pain.   causes diffuse discomfort.  ROM: full extension to 115 flexion, anterior and medial discomfort. Medial crepitus with range of motion.  Effusion: trace  Tender: severe medial joint line, posterior knee  NTTP lateral joint line.  McMurrays: positive medially.    MCL: stable, and non-painful at both 0 and 30 degrees knee flexion  Varus stress: stable, and non-painful at both 0 and 30 degrees knee flexion  Lachmans: neg, firm endpoint  Posterior Drawer stable  Patellofemoral joint:                Apprehension: negative              Crepitations: mild   Grind: positive.    X-RAY: no new images today. 3 views right knee from 10/28/2020 - no obvious fractures or dislocations. Moderate medial compartment narrowing with subchondral sclerosis, mild patello-femoral degenerative changes with marginal osteophytes.       ASSESSMENT/PLAN: Lance Hogue is a 61 year old male with chronic right knee pain, primary osteoarthritis.     * reviewed imaging studies with patient, showing arthritic changes, or wearing of the cartilage in the knee. This can be caused by normal \"wear and tear\" over the years or following prior injury to the knee.  * treatment usually nonsurgical to start, then can progress to surgical with total knee arthroplasty once nonsurgical management effective.    Non-surgical " "treatment for knee arthritis includes:    * rest, sitting  * Activity modification - avoid impact activities or activities that aggravate symptoms.  * NSAIDS (non-steroidal anti-inflammatory medications; e.g. Aleve, advil, motrin, ibuprofen) - regular use for inflammation ( twice daily or three times daily), with food, as long as no contra-indications Please discuss with primary care doctor if needed  * ice, 15-20 minutes at a time several times a day or as needed.  * Strengthening of quadriceps muscles  * Physical Therapy for strengthening, stretching and range of motion exercises of legs  * Tylenol as needed for pain, consider Tylenol arthritis or similar  * Weight loss: Weight loss:  Body mass index is 32.46 kg/m .. weight loss benefits, not only for the current pain symptoms, but also overall health. Recommend a good diet plan that works for the patient, with the assistance of a dietician or primary care doctor as needed. Also, a good, low-impact exercise program for at least 20 minutes per day, 3 times per week, such as exercise bike, elliptical , or pool.  * Exercise: low impact such as stationary bike, elliptical, pool.  * Injections: cortisone versus viscosupplementation (hyaluronic acid, \"rooster comb\", \"gel shots\"); risks and perceived benefits discussed today. Patient elects to proceed.  * Bracing: bracing the knee may offer some relief of symptoms when worn and provide some stability.  * over the counter supplements such as glucosamine and chondroitin sulfate may help with joint pain.  * topical ointments may help as well    * return to clinic as needed.      Flash Boggs M.D., M.S.  Dept. of Orthopaedic Surgery  [facility]    Large Joint Injection/Arthocentesis: R knee joint    Date/Time: 8/17/2022 4:29 PM  Performed by: Flash Boggs MD  Authorized by: Flash Boggs MD     Indications:  Pain  Needle Size:  22 G  Guidance: landmark guided    Approach:  " Anteromedial  Location:  Knee      Medications:  80 mg methylPREDNISolone 80 MG/ML; 4 mL bupivacaine 0.25 %  Outcome:  Tolerated well, no immediate complications  Procedure discussed: discussed risks, benefits, and alternatives    Consent Given by:  Patient  Timeout: timeout called immediately prior to procedure    Prep: patient was prepped and draped in usual sterile fashion

## 2022-08-17 ENCOUNTER — OFFICE VISIT (OUTPATIENT)
Dept: ORTHOPEDICS | Facility: CLINIC | Age: 62
End: 2022-08-17
Payer: COMMERCIAL

## 2022-08-17 VITALS
WEIGHT: 246 LBS | DIASTOLIC BLOOD PRESSURE: 100 MMHG | SYSTOLIC BLOOD PRESSURE: 162 MMHG | HEIGHT: 73 IN | BODY MASS INDEX: 32.6 KG/M2 | HEART RATE: 73 BPM

## 2022-08-17 DIAGNOSIS — M17.11 PRIMARY OSTEOARTHRITIS OF RIGHT KNEE: Primary | ICD-10-CM

## 2022-08-17 PROCEDURE — 20610 DRAIN/INJ JOINT/BURSA W/O US: CPT | Mod: RT | Performed by: ORTHOPAEDIC SURGERY

## 2022-08-17 RX ORDER — METHYLPREDNISOLONE ACETATE 80 MG/ML
80 INJECTION, SUSPENSION INTRA-ARTICULAR; INTRALESIONAL; INTRAMUSCULAR; SOFT TISSUE
Status: DISCONTINUED | OUTPATIENT
Start: 2022-08-17 | End: 2022-12-05

## 2022-08-17 RX ORDER — BUPIVACAINE HYDROCHLORIDE 2.5 MG/ML
4 INJECTION, SOLUTION INFILTRATION; PERINEURAL
Status: DISCONTINUED | OUTPATIENT
Start: 2022-08-17 | End: 2022-12-05

## 2022-08-17 RX ADMIN — BUPIVACAINE HYDROCHLORIDE 4 ML: 2.5 INJECTION, SOLUTION INFILTRATION; PERINEURAL at 16:29

## 2022-08-17 RX ADMIN — METHYLPREDNISOLONE ACETATE 80 MG: 80 INJECTION, SUSPENSION INTRA-ARTICULAR; INTRALESIONAL; INTRAMUSCULAR; SOFT TISSUE at 16:29

## 2022-08-17 ASSESSMENT — PAIN SCALES - GENERAL: PAINLEVEL: EXTREME PAIN (8)

## 2022-08-17 NOTE — LETTER
8/17/2022         RE: Lance Hogue  [address]  [place] [address]        Dear Colleague,    Thank you for referring your patient, Lance Hogue, to the [facility]. Please see a copy of my visit note below.    CHIEF COMPLAINT:   Chief Complaint   Patient presents with     Right Knee - Pain, Follow Up     Right knee pain - last injection on 4/11/22 provided relief for 3.5 months. He would like to do a repeat injection today.          HISTORY OF PRESENT ILLNESS    Lance Hogue is a 61 year old male seen for followup evaluation of ongoing right knee pain with no known injury. Last seen for injections 4/11/2022,  7/26/2021 and prior to that on 3/4/2021. Returns today stating the injection worked well fo 3.5 months or so. He'd like another injection today.      Pain has been present for ~4 years, or so, progressively getting worse. Pain is constant, day and night, gets worse as the day goes on. Aggravated with bumping the knee, catching the foot, prolonged weight bearing activities. Better with rest, elevation, pillow behind the knee. Treatment has been a brace at work, icy hot at night.    denies low back pain, denies numbness and tingling, denies hip pain.    Present symptoms: pain medially  and anteriorly, pain sharp, shooting, dull/achy , moderate pain, mild swelling.    Pain severity: 8/10  Frequency of symptoms: are constant  Exacerbating Factors: weight bearing, stairs down more than up, prolonged standing  Relieving Factors: rest, sitting  Night Pain: Yes  Pain while at rest: No   Numbness or tingling: No   Patient has tried:     NSAIDS: No      Physical Therapy: Yes      Activity modification: No      Bracing: Yes      Injections: Yes  12/2021, 7/2021, 3/4/2021, 10/2020      Ice: No      Assistive device:  No     Other: icy hot      Other PMH:  has a past medical history of Hyperlipidemia.  Patient Active Problem List   Diagnosis     Chronic pain of  "right knee     Primary osteoarthritis of right knee       Surgical Hx:  has a past surgical history that includes ENT surgery; Abdomen surgery; tonsillectomy; Laparoscopic herniorrhaphy inguinal (Bilateral, 11/8/2021); and Herniorrhaphy umbilical (N/A, 11/8/2021).    Medications:   Current Outpatient Medications:      atorvastatin (LIPITOR) 40 MG tablet, Take 1 tablet (40 mg) by mouth daily, Disp: 90 tablet, Rfl: 3     docusate sodium (COLACE) 100 MG capsule, Take 1 capsule (100 mg) by mouth 2 times daily Take while on opiate to prevent constipation, Disp: , Rfl: 0     fish oil-omega-3 fatty acids 1000 MG capsule, 1 cap(s), Disp: , Rfl:      Multiple Vitamin (MULTI VITAMIN) TABS, 1 tab(s), Disp: , Rfl:      niacin 250 MG CR capsule, 1 cap(s), Disp: , Rfl:      tadalafil (CIALIS) 5 MG tablet, TAKE ONE TABLET BY MOUTH EVERY 24 HOURS, Disp: 30 tablet, Rfl: 0    Allergies:   Allergies   Allergen Reactions     Chocolate Diarrhea and Cramps     Cocoa      Other reaction(s): Abdominal cramping       Social Hx: .   reports that he has never smoked. He has quit using smokeless tobacco.  His smokeless tobacco use included snuff. He reports current alcohol use. He reports that he does not use drugs.    Family Hx: family history includes Alcoholism in his brother; Alzheimer Disease in his mother; Cancer in his mother, paternal grandfather, and paternal grandmother; Other - See Comments in his father.    REVIEW OF SYSTEMS:   CONSTITUTIONAL:NEGATIVE for fever, chills, change in weight  INTEGUMENTARY/SKIN: NEGATIVE for worrisome rashes, moles or lesions  MUSCULOSKELETAL:See HPI above  NEURO: NEGATIVE for weakness, dizziness or paresthesias    PHYSICAL EXAM:  BP (!) 162/100   Pulse 73   Ht 1.854 m (6' 1\")   Wt 111.6 kg (246 lb)   BMI 32.46 kg/m     GENERAL APPEARANCE: healthy, alert, no distress  SKIN: no suspicious lesions or rashes  NEURO: Normal strength and tone, mentation intact and speech normal  PSYCH:  " mentation appears normal and affect normal, not anxious  RESPIRATORY: No increased work of breathing.      BILATERAL LOWER EXTREMITIES:  Gait: normal  Alignment: varus  No Quad atrophy, strength normal.  calf soft and nttp or squeeze.  Edema: trace      LEFT KNEE EXAM:    Skin: intact, no ecchymosis or erythema  ROM: full extension to 125 flexion  Tight hamstrings on straight leg raise.  Effusion: none  Tender: NTTP med/lat joint line, anterior or posterior knee  McMurrays: negative    MCL: stable, and non-painful at both 0 and 30 degrees knee flexion  Varus stress: stable, and non-painful at both 0 and 30 degrees knee flexion  Lachmans: neg, firm endpoint  Posterior Drawer stable  Patellofemoral joint:                Apprehension: negative              Crepitations: minimal      RIGHT KNEE EXAM:    Skin: intact, no ecchymosis or erythema  Squat: 100 %, not limited by pain.   causes diffuse discomfort.  ROM: full extension to 115 flexion, anterior and medial discomfort. Medial crepitus with range of motion.  Effusion: trace  Tender: severe medial joint line, posterior knee  NTTP lateral joint line.  McMurrays: positive medially.    MCL: stable, and non-painful at both 0 and 30 degrees knee flexion  Varus stress: stable, and non-painful at both 0 and 30 degrees knee flexion  Lachmans: neg, firm endpoint  Posterior Drawer stable  Patellofemoral joint:                Apprehension: negative              Crepitations: mild   Grind: positive.    X-RAY: no new images today. 3 views right knee from 10/28/2020 - no obvious fractures or dislocations. Moderate medial compartment narrowing with subchondral sclerosis, mild patello-femoral degenerative changes with marginal osteophytes.       ASSESSMENT/PLAN: Lance Hogue is a 61 year old male with chronic right knee pain, primary osteoarthritis.     * reviewed imaging studies with patient, showing arthritic changes, or wearing of the cartilage in the knee. This can be  "caused by normal \"wear and tear\" over the years or following prior injury to the knee.  * treatment usually nonsurgical to start, then can progress to surgical with total knee arthroplasty once nonsurgical management effective.    Non-surgical treatment for knee arthritis includes:    * rest, sitting  * Activity modification - avoid impact activities or activities that aggravate symptoms.  * NSAIDS (non-steroidal anti-inflammatory medications; e.g. Aleve, advil, motrin, ibuprofen) - regular use for inflammation ( twice daily or three times daily), with food, as long as no contra-indications Please discuss with primary care doctor if needed  * ice, 15-20 minutes at a time several times a day or as needed.  * Strengthening of quadriceps muscles  * Physical Therapy for strengthening, stretching and range of motion exercises of legs  * Tylenol as needed for pain, consider Tylenol arthritis or similar  * Weight loss: Weight loss:  Body mass index is 32.46 kg/m .. weight loss benefits, not only for the current pain symptoms, but also overall health. Recommend a good diet plan that works for the patient, with the assistance of a dietician or primary care doctor as needed. Also, a good, low-impact exercise program for at least 20 minutes per day, 3 times per week, such as exercise bike, elliptical , or pool.  * Exercise: low impact such as stationary bike, elliptical, pool.  * Injections: cortisone versus viscosupplementation (hyaluronic acid, \"rooster comb\", \"gel shots\"); risks and perceived benefits discussed today. Patient elects to proceed.  * Bracing: bracing the knee may offer some relief of symptoms when worn and provide some stability.  * over the counter supplements such as glucosamine and chondroitin sulfate may help with joint pain.  * topical ointments may help as well    * return to clinic as needed.      Flash Boggs M.D., M.S.  Dept. of Orthopaedic Surgery  [facility]    Large Joint " Injection/Arthocentesis: R knee joint    Date/Time: 8/17/2022 4:29 PM  Performed by: Flash Boggs MD  Authorized by: Flash Boggs MD     Indications:  Pain  Needle Size:  22 G  Guidance: landmark guided    Approach:  Anteromedial  Location:  Knee      Medications:  80 mg methylPREDNISolone 80 MG/ML; 4 mL bupivacaine 0.25 %  Outcome:  Tolerated well, no immediate complications  Procedure discussed: discussed risks, benefits, and alternatives    Consent Given by:  Patient  Timeout: timeout called immediately prior to procedure    Prep: patient was prepped and draped in usual sterile fashion              Again, thank you for allowing me to participate in the care of your patient.        Sincerely,        Flash Boggs MD

## 2022-08-20 DIAGNOSIS — N52.03 COMBINED ARTERIAL INSUFFICIENCY AND CORPORO-VENOUS OCCLUSIVE ERECTILE DYSFUNCTION: ICD-10-CM

## 2022-08-22 RX ORDER — TADALAFIL 5 MG/1
TABLET ORAL
Qty: 30 TABLET | Refills: 0 | Status: SHIPPED | OUTPATIENT
Start: 2022-08-22 | End: 2022-10-31

## 2022-11-28 DIAGNOSIS — E78.5 HYPERLIPIDEMIA WITH TARGET LDL LESS THAN 160: Primary | ICD-10-CM

## 2022-11-29 RX ORDER — ATORVASTATIN CALCIUM 40 MG/1
40 TABLET, FILM COATED ORAL DAILY
Qty: 90 TABLET | Refills: 0 | Status: SHIPPED | OUTPATIENT
Start: 2022-11-29 | End: 2023-01-15

## 2022-11-29 NOTE — TELEPHONE ENCOUNTER
Routing refill request to provider for review/approval because:  Patient needs to be seen because it has been more than 1 year since last office visit.  Gio Brizuela RN

## 2022-12-02 NOTE — PROGRESS NOTES
CHIEF COMPLAINT:   Chief Complaint   Patient presents with     Right Knee - Pain     Last injection: 8/17/22.          HISTORY OF PRESENT ILLNESS    Lance Hogue is a 62 year old male seen for followup evaluation of ongoing right knee pain with no known injury. Last seen for injections 8/17/2022, 4/11/2022,  7/26/2021 and prior to that on 3/4/2021. Returns today stating the injection worked well fo 3 months or so. He'd like another injection today.      Pain has been present for ~4 years, or so, progressively getting worse. Pain is constant, day and night, gets worse as the day goes on. Aggravated with bumping the knee, catching the foot, prolonged weight bearing activities. Better with rest, elevation, pillow behind the knee. Treatment has been a brace at work, icy hot at night.    denies low back pain, denies numbness and tingling, denies hip pain.    Present symptoms: pain medially  and anteriorly, pain sharp, shooting, dull/achy , moderate pain, mild swelling.    Pain severity: 8/10  Frequency of symptoms: are constant  Exacerbating Factors: weight bearing, stairs down more than up, prolonged standing  Relieving Factors: rest, sitting  Night Pain: Yes  Pain while at rest: No   Numbness or tingling: No   Patient has tried:     NSAIDS: No      Physical Therapy: Yes      Activity modification: No      Bracing: Yes      Injections: Yes    8/17/2022, 12/2021, 7/2021, 3/4/2021, 10/2020      Ice: No      Assistive device:  No     Other: icy hot      Other PMH:  has a past medical history of Hyperlipidemia.  Patient Active Problem List   Diagnosis     Chronic pain of right knee     Primary osteoarthritis of right knee       Surgical Hx:  has a past surgical history that includes ENT surgery; Abdomen surgery; tonsillectomy; Laparoscopic herniorrhaphy inguinal (Bilateral, 11/8/2021); and Herniorrhaphy umbilical (N/A, 11/8/2021).    Medications:   Current Outpatient Medications:      atorvastatin (LIPITOR) 40 MG  "tablet, TAKE 1 TABLET (40 MG) BY MOUTH DAILY, Disp: 90 tablet, Rfl: 0     docusate sodium (COLACE) 100 MG capsule, Take 1 capsule (100 mg) by mouth 2 times daily Take while on opiate to prevent constipation, Disp: , Rfl: 0     fish oil-omega-3 fatty acids 1000 MG capsule, 1 cap(s), Disp: , Rfl:      Multiple Vitamin (MULTI VITAMIN) TABS, 1 tab(s), Disp: , Rfl:      niacin 250 MG CR capsule, 1 cap(s), Disp: , Rfl:      tadalafil (CIALIS) 5 MG tablet, TAKE ONE TABLET BY MOUTH EVERY 24 HOURS, Disp: 30 tablet, Rfl: 0    Allergies:   Allergies   Allergen Reactions     Chocolate Diarrhea and Cramps     Cocoa      Other reaction(s): Abdominal cramping       Social Hx: .   reports that he has never smoked. He has quit using smokeless tobacco.  His smokeless tobacco use included snuff. He reports current alcohol use. He reports that he does not use drugs.    Family Hx: family history includes Alcoholism in his brother; Alzheimer Disease in his mother; Cancer in his mother, paternal grandfather, and paternal grandmother; Other - See Comments in his father.    REVIEW OF SYSTEMS:   CONSTITUTIONAL:NEGATIVE for fever, chills, change in weight  INTEGUMENTARY/SKIN: NEGATIVE for worrisome rashes, moles or lesions  MUSCULOSKELETAL:See HPI above  NEURO: NEGATIVE for weakness, dizziness or paresthesias    PHYSICAL EXAM:  BP (!) 150/61   Pulse 62   Ht 1.854 m (6' 1\")   Wt 112.4 kg (247 lb 12.8 oz)   BMI 32.69 kg/m     GENERAL APPEARANCE: healthy, alert, no distress; accompanied by his wife.  SKIN: no suspicious lesions or rashes  NEURO: Normal strength and tone, mentation intact and speech normal  PSYCH:  mentation appears normal and affect normal, not anxious  RESPIRATORY: No increased work of breathing.      BILATERAL LOWER EXTREMITIES:  Gait: normal  Alignment: varus  No Quad atrophy, strength normal.  calf soft and nttp or squeeze.  Edema: trace      LEFT KNEE EXAM:    Skin: intact, no ecchymosis or " "erythema  ROM: full extension to 125 flexion  Tight hamstrings on straight leg raise.  Effusion: none  Tender: NTTP med/lat joint line, anterior or posterior knee  McMurrays: negative    MCL: stable, and non-painful at both 0 and 30 degrees knee flexion  Varus stress: stable, and non-painful at both 0 and 30 degrees knee flexion  Lachmans: neg, firm endpoint  Posterior Drawer stable  Patellofemoral joint:                Apprehension: negative              Crepitations: minimal      RIGHT KNEE EXAM:    Skin: intact, no ecchymosis or erythema  Squat: 100 %, not limited by pain.   causes diffuse discomfort.  ROM: full extension to 115 flexion, anterior and medial discomfort. Medial crepitus with range of motion.  Effusion: trace  Tender: severe medial joint line, posterior knee  NTTP lateral joint line.  McMurrays: positive medially.    MCL: stable, and non-painful at both 0 and 30 degrees knee flexion  Varus stress: stable, and non-painful at both 0 and 30 degrees knee flexion  Lachmans: neg, firm endpoint  Posterior Drawer stable  Patellofemoral joint:                Apprehension: negative              Crepitations: mild   Grind: positive.    X-RAY: no new images today. 3 views right knee from 10/28/2020 - no obvious fractures or dislocations. Moderate medial compartment narrowing with subchondral sclerosis, mild patello-femoral degenerative changes with marginal osteophytes.       ASSESSMENT/PLAN: Lance Hogue is a 62 year old male with chronic right knee pain, primary osteoarthritis.     * reviewed imaging studies with patient, showing arthritic changes, or wearing of the cartilage in the knee. This can be caused by normal \"wear and tear\" over the years or following prior injury to the knee.  * treatment usually nonsurgical to start, then can progress to surgical with total knee arthroplasty once nonsurgical management effective.    Non-surgical treatment for knee arthritis includes:    * rest, sitting  * " "Activity modification - avoid impact activities or activities that aggravate symptoms.  * NSAIDS (non-steroidal anti-inflammatory medications; e.g. Aleve, advil, motrin, ibuprofen) - regular use for inflammation ( twice daily or three times daily), with food, as long as no contra-indications Please discuss with primary care doctor if needed  * ice, 15-20 minutes at a time several times a day or as needed.  * Strengthening of quadriceps muscles  * Physical Therapy for strengthening, stretching and range of motion exercises of legs  * Tylenol as needed for pain, consider Tylenol arthritis or similar  * Weight loss: Weight loss:  Body mass index is 32.46 kg/m .. weight loss benefits, not only for the current pain symptoms, but also overall health. Recommend a good diet plan that works for the patient, with the assistance of a dietician or primary care doctor as needed. Also, a good, low-impact exercise program for at least 20 minutes per day, 3 times per week, such as exercise bike, elliptical , or pool.  * Exercise: low impact such as stationary bike, elliptical, pool.  * Injections: cortisone versus viscosupplementation (hyaluronic acid, \"rooster comb\", \"gel shots\"); risks and perceived benefits discussed today. Patient elects to proceed.  * Bracing: bracing the knee may offer some relief of symptoms when worn and provide some stability.  * over the counter supplements such as glucosamine and chondroitin sulfate may help with joint pain.  * topical ointments may help as well    * return to clinic as needed.      Flash Boggs M.D., M.S.  Dept. of Orthopaedic Surgery  [facility]    Large Joint Injection/Arthocentesis: R knee joint    Date/Time: 12/5/2022 3:55 PM  Performed by: Andrea Alvarez PA  Authorized by: Flash Boggs MD     Indications:  Pain and osteoarthritis  Needle Size:  22 G  Guidance: landmark guided    Approach:  Anteromedial  Location:  Knee      Medications:  80 mg " methylPREDNISolone 80 MG/ML; 4 mL bupivacaine 0.25 %  Outcome:  Tolerated well, no immediate complications  Procedure discussed: discussed risks, benefits, and alternatives    Consent Given by:  Patient  Prep: patient was prepped and draped in usual sterile fashion

## 2022-12-05 ENCOUNTER — OFFICE VISIT (OUTPATIENT)
Dept: ORTHOPEDICS | Facility: CLINIC | Age: 62
End: 2022-12-05
Payer: COMMERCIAL

## 2022-12-05 VITALS
HEIGHT: 73 IN | DIASTOLIC BLOOD PRESSURE: 61 MMHG | SYSTOLIC BLOOD PRESSURE: 150 MMHG | HEART RATE: 62 BPM | WEIGHT: 247.8 LBS | BODY MASS INDEX: 32.84 KG/M2

## 2022-12-05 DIAGNOSIS — M17.11 PRIMARY OSTEOARTHRITIS OF RIGHT KNEE: Primary | ICD-10-CM

## 2022-12-05 PROCEDURE — 20610 DRAIN/INJ JOINT/BURSA W/O US: CPT | Mod: RT | Performed by: ORTHOPAEDIC SURGERY

## 2022-12-05 RX ORDER — METHYLPREDNISOLONE ACETATE 80 MG/ML
80 INJECTION, SUSPENSION INTRA-ARTICULAR; INTRALESIONAL; INTRAMUSCULAR; SOFT TISSUE
Status: DISCONTINUED | OUTPATIENT
Start: 2022-12-05 | End: 2023-04-12

## 2022-12-05 RX ORDER — BUPIVACAINE HYDROCHLORIDE 2.5 MG/ML
4 INJECTION, SOLUTION INFILTRATION; PERINEURAL
Status: DISCONTINUED | OUTPATIENT
Start: 2022-12-05 | End: 2023-04-12

## 2022-12-05 RX ADMIN — BUPIVACAINE HYDROCHLORIDE 4 ML: 2.5 INJECTION, SOLUTION INFILTRATION; PERINEURAL at 15:55

## 2022-12-05 RX ADMIN — METHYLPREDNISOLONE ACETATE 80 MG: 80 INJECTION, SUSPENSION INTRA-ARTICULAR; INTRALESIONAL; INTRAMUSCULAR; SOFT TISSUE at 15:55

## 2022-12-05 ASSESSMENT — PAIN SCALES - GENERAL: PAINLEVEL: EXTREME PAIN (8)

## 2022-12-05 NOTE — LETTER
12/5/2022         RE: Lance Hogue  [id number]  [place] [facility] [address]        Dear Colleague,    Thank you for referring your patient, Lance Hogue, to the [facility]. Please see a copy of my visit note below.    CHIEF COMPLAINT:   Chief Complaint   Patient presents with     Right Knee - Pain     Last injection: 8/17/22.          HISTORY OF PRESENT ILLNESS    Lance Hogue is a 62 year old male seen for followup evaluation of ongoing right knee pain with no known injury. Last seen for injections 8/17/2022, 4/11/2022,  7/26/2021 and prior to that on 3/4/2021. Returns today stating the injection worked well fo 3 months or so. He'd like another injection today.      Pain has been present for ~4 years, or so, progressively getting worse. Pain is constant, day and night, gets worse as the day goes on. Aggravated with bumping the knee, catching the foot, prolonged weight bearing activities. Better with rest, elevation, pillow behind the knee. Treatment has been a brace at work, icy hot at night.    denies low back pain, denies numbness and tingling, denies hip pain.    Present symptoms: pain medially  and anteriorly, pain sharp, shooting, dull/achy , moderate pain, mild swelling.    Pain severity: 8/10  Frequency of symptoms: are constant  Exacerbating Factors: weight bearing, stairs down more than up, prolonged standing  Relieving Factors: rest, sitting  Night Pain: Yes  Pain while at rest: No   Numbness or tingling: No   Patient has tried:     NSAIDS: No      Physical Therapy: Yes      Activity modification: No      Bracing: Yes      Injections: Yes    8/17/2022, 12/2021, 7/2021, 3/4/2021, 10/2020      Ice: No      Assistive device:  No     Other: icy hot      Other PMH:  has a past medical history of Hyperlipidemia.  Patient Active Problem List   Diagnosis     Chronic pain of right knee     Primary osteoarthritis of right knee       Surgical Hx:  has a past  "surgical history that includes ENT surgery; Abdomen surgery; tonsillectomy; Laparoscopic herniorrhaphy inguinal (Bilateral, 11/8/2021); and Herniorrhaphy umbilical (N/A, 11/8/2021).    Medications:   Current Outpatient Medications:      atorvastatin (LIPITOR) 40 MG tablet, TAKE 1 TABLET (40 MG) BY MOUTH DAILY, Disp: 90 tablet, Rfl: 0     docusate sodium (COLACE) 100 MG capsule, Take 1 capsule (100 mg) by mouth 2 times daily Take while on opiate to prevent constipation, Disp: , Rfl: 0     fish oil-omega-3 fatty acids 1000 MG capsule, 1 cap(s), Disp: , Rfl:      Multiple Vitamin (MULTI VITAMIN) TABS, 1 tab(s), Disp: , Rfl:      niacin 250 MG CR capsule, 1 cap(s), Disp: , Rfl:      tadalafil (CIALIS) 5 MG tablet, TAKE ONE TABLET BY MOUTH EVERY 24 HOURS, Disp: 30 tablet, Rfl: 0    Allergies:   Allergies   Allergen Reactions     Chocolate Diarrhea and Cramps     Cocoa      Other reaction(s): Abdominal cramping       Social Hx: .   reports that he has never smoked. He has quit using smokeless tobacco.  His smokeless tobacco use included snuff. He reports current alcohol use. He reports that he does not use drugs.    Family Hx: family history includes Alcoholism in his brother; Alzheimer Disease in his mother; Cancer in his mother, paternal grandfather, and paternal grandmother; Other - See Comments in his father.    REVIEW OF SYSTEMS:   CONSTITUTIONAL:NEGATIVE for fever, chills, change in weight  INTEGUMENTARY/SKIN: NEGATIVE for worrisome rashes, moles or lesions  MUSCULOSKELETAL:See HPI above  NEURO: NEGATIVE for weakness, dizziness or paresthesias    PHYSICAL EXAM:  BP (!) 150/61   Pulse 62   Ht 1.854 m (6' 1\")   Wt 112.4 kg (247 lb 12.8 oz)   BMI 32.69 kg/m     GENERAL APPEARANCE: healthy, alert, no distress; accompanied by his wife.  SKIN: no suspicious lesions or rashes  NEURO: Normal strength and tone, mentation intact and speech normal  PSYCH:  mentation appears normal and affect normal, not " "anxious  RESPIRATORY: No increased work of breathing.      BILATERAL LOWER EXTREMITIES:  Gait: normal  Alignment: varus  No Quad atrophy, strength normal.  calf soft and nttp or squeeze.  Edema: trace      LEFT KNEE EXAM:    Skin: intact, no ecchymosis or erythema  ROM: full extension to 125 flexion  Tight hamstrings on straight leg raise.  Effusion: none  Tender: NTTP med/lat joint line, anterior or posterior knee  McMurrays: negative    MCL: stable, and non-painful at both 0 and 30 degrees knee flexion  Varus stress: stable, and non-painful at both 0 and 30 degrees knee flexion  Lachmans: neg, firm endpoint  Posterior Drawer stable  Patellofemoral joint:                Apprehension: negative              Crepitations: minimal      RIGHT KNEE EXAM:    Skin: intact, no ecchymosis or erythema  Squat: 100 %, not limited by pain.   causes diffuse discomfort.  ROM: full extension to 115 flexion, anterior and medial discomfort. Medial crepitus with range of motion.  Effusion: trace  Tender: severe medial joint line, posterior knee  NTTP lateral joint line.  McMurrays: positive medially.    MCL: stable, and non-painful at both 0 and 30 degrees knee flexion  Varus stress: stable, and non-painful at both 0 and 30 degrees knee flexion  Lachmans: neg, firm endpoint  Posterior Drawer stable  Patellofemoral joint:                Apprehension: negative              Crepitations: mild   Grind: positive.    X-RAY: no new images today. 3 views right knee from 10/28/2020 - no obvious fractures or dislocations. Moderate medial compartment narrowing with subchondral sclerosis, mild patello-femoral degenerative changes with marginal osteophytes.       ASSESSMENT/PLAN: Lance Hogue is a 62 year old male with chronic right knee pain, primary osteoarthritis.     * reviewed imaging studies with patient, showing arthritic changes, or wearing of the cartilage in the knee. This can be caused by normal \"wear and tear\" over the years or " "following prior injury to the knee.  * treatment usually nonsurgical to start, then can progress to surgical with total knee arthroplasty once nonsurgical management effective.    Non-surgical treatment for knee arthritis includes:    * rest, sitting  * Activity modification - avoid impact activities or activities that aggravate symptoms.  * NSAIDS (non-steroidal anti-inflammatory medications; e.g. Aleve, advil, motrin, ibuprofen) - regular use for inflammation ( twice daily or three times daily), with food, as long as no contra-indications Please discuss with primary care doctor if needed  * ice, 15-20 minutes at a time several times a day or as needed.  * Strengthening of quadriceps muscles  * Physical Therapy for strengthening, stretching and range of motion exercises of legs  * Tylenol as needed for pain, consider Tylenol arthritis or similar  * Weight loss: Weight loss:  Body mass index is 32.46 kg/m .. weight loss benefits, not only for the current pain symptoms, but also overall health. Recommend a good diet plan that works for the patient, with the assistance of a dietician or primary care doctor as needed. Also, a good, low-impact exercise program for at least 20 minutes per day, 3 times per week, such as exercise bike, elliptical , or pool.  * Exercise: low impact such as stationary bike, elliptical, pool.  * Injections: cortisone versus viscosupplementation (hyaluronic acid, \"rooster comb\", \"gel shots\"); risks and perceived benefits discussed today. Patient elects to proceed.  * Bracing: bracing the knee may offer some relief of symptoms when worn and provide some stability.  * over the counter supplements such as glucosamine and chondroitin sulfate may help with joint pain.  * topical ointments may help as well    * return to clinic as needed.      Flash Boggs M.D., M.S.  Dept. of Orthopaedic Surgery  [facility]    Large Joint Injection/Arthocentesis: R knee joint    Date/Time: " 12/5/2022 3:55 PM  Performed by: Andrea Alvarez PA  Authorized by: Flash Boggs MD     Indications:  Pain and osteoarthritis  Needle Size:  22 G  Guidance: landmark guided    Approach:  Anteromedial  Location:  Knee      Medications:  80 mg methylPREDNISolone 80 MG/ML; 4 mL bupivacaine 0.25 %  Outcome:  Tolerated well, no immediate complications  Procedure discussed: discussed risks, benefits, and alternatives    Consent Given by:  Patient  Prep: patient was prepped and draped in usual sterile fashion              Again, thank you for allowing me to participate in the care of your patient.        Sincerely,        Flash Boggs MD

## 2023-01-09 ENCOUNTER — OFFICE VISIT (OUTPATIENT)
Dept: FAMILY MEDICINE | Facility: CLINIC | Age: 63
End: 2023-01-09
Payer: COMMERCIAL

## 2023-01-09 VITALS
WEIGHT: 250.4 LBS | BODY MASS INDEX: 33.19 KG/M2 | RESPIRATION RATE: 18 BRPM | TEMPERATURE: 98.5 F | HEART RATE: 69 BPM | SYSTOLIC BLOOD PRESSURE: 138 MMHG | DIASTOLIC BLOOD PRESSURE: 88 MMHG | HEIGHT: 73 IN | OXYGEN SATURATION: 94 %

## 2023-01-09 DIAGNOSIS — N52.03 COMBINED ARTERIAL INSUFFICIENCY AND CORPORO-VENOUS OCCLUSIVE ERECTILE DYSFUNCTION: ICD-10-CM

## 2023-01-09 DIAGNOSIS — E78.5 HYPERLIPIDEMIA LDL GOAL <100: ICD-10-CM

## 2023-01-09 DIAGNOSIS — M17.11 PRIMARY OSTEOARTHRITIS OF RIGHT KNEE: ICD-10-CM

## 2023-01-09 DIAGNOSIS — Z12.5 SCREENING FOR PROSTATE CANCER: ICD-10-CM

## 2023-01-09 DIAGNOSIS — Z00.00 ROUTINE GENERAL MEDICAL EXAMINATION AT A HEALTH CARE FACILITY: Primary | ICD-10-CM

## 2023-01-09 LAB
ALT SERPL W P-5'-P-CCNC: 51 U/L (ref 10–50)
CHOLEST SERPL-MCNC: 205 MG/DL
HDLC SERPL-MCNC: 59 MG/DL
LDLC SERPL CALC-MCNC: 130 MG/DL
NONHDLC SERPL-MCNC: 146 MG/DL
PSA SERPL-MCNC: 1.06 NG/ML (ref 0–4.5)
TRIGL SERPL-MCNC: 80 MG/DL

## 2023-01-09 PROCEDURE — 36415 COLL VENOUS BLD VENIPUNCTURE: CPT | Performed by: FAMILY MEDICINE

## 2023-01-09 PROCEDURE — 99396 PREV VISIT EST AGE 40-64: CPT | Performed by: FAMILY MEDICINE

## 2023-01-09 PROCEDURE — G0103 PSA SCREENING: HCPCS | Performed by: FAMILY MEDICINE

## 2023-01-09 PROCEDURE — 80061 LIPID PANEL: CPT | Performed by: FAMILY MEDICINE

## 2023-01-09 PROCEDURE — 84460 ALANINE AMINO (ALT) (SGPT): CPT | Performed by: FAMILY MEDICINE

## 2023-01-09 RX ORDER — TADALAFIL 5 MG/1
TABLET ORAL
Qty: 30 TABLET | Refills: 4 | Status: SHIPPED | OUTPATIENT
Start: 2023-01-09 | End: 2023-02-22

## 2023-01-09 ASSESSMENT — ENCOUNTER SYMPTOMS
EYE PAIN: 0
SHORTNESS OF BREATH: 0
HEMATURIA: 0
CHILLS: 0
PALPITATIONS: 0
FEVER: 0
PARESTHESIAS: 0
NAUSEA: 0
COUGH: 0
NERVOUS/ANXIOUS: 0
MYALGIAS: 0
DYSURIA: 0
SORE THROAT: 0
HEADACHES: 0
DIARRHEA: 0
HEARTBURN: 0
WEAKNESS: 0
ABDOMINAL PAIN: 0
CONSTIPATION: 0
ARTHRALGIAS: 1
HEMATOCHEZIA: 0
DIZZINESS: 0
FREQUENCY: 0
JOINT SWELLING: 0

## 2023-01-09 ASSESSMENT — PAIN SCALES - GENERAL: PAINLEVEL: NO PAIN (0)

## 2023-01-09 NOTE — PROGRESS NOTES
SUBJECTIVE:   CC: Lance is an 62 year old who presents for preventative health visit.     Patient has been advised of split billing requirements and indicates understanding: Yes     Healthy Habits:     Getting at least 3 servings of Calcium per day:  Yes    Bi-annual eye exam:  NO    Dental care twice a year:  Yes    Sleep apnea or symptoms of sleep apnea:  None    Diet:  Regular (no restrictions)    Frequency of exercise:  6-7 days/week    Duration of exercise:  15-30 minutes    Taking medications regularly:  Yes    Medication side effects:  None    PHQ-2 Total Score: 0    Additional concerns today:  No    -Says he has some other things he would like to discuss.      Today's PHQ-2 Score:   PHQ-2 ( 1999 [employer]) 1/9/2023   Q1: Little interest or pleasure in doing things 0   Q2: Feeling down, depressed or hopeless 0   PHQ-2 Score 0   PHQ-2 Total Score (12-17 Years)- Positive if 3 or more points; Administer PHQ-A if positive -   Q1: Little interest or pleasure in doing things Not at all   Q2: Feeling down, depressed or hopeless Not at all   PHQ-2 Score 0     Social History     Tobacco Use     Smoking status: Never     Smokeless tobacco: Former     Types: Snuff   Substance Use Topics     Alcohol use: Yes     If you drink alcohol do you typically have >3 drinks per day or >7 drinks per week? Yes      Alcohol Use 1/9/2023   Prescreen: >3 drinks/day or >7 drinks/week? Yes   Prescreen: >3 drinks/day or >7 drinks/week? -   AUDIT SCORE  5     AUDIT - Alcohol Use Disorders Identification Test - Reproduced from the World Health Organization Audit 2001 (Second Edition) 1/9/2023   1.  How often do you have a drink containing alcohol? 4 or more times a week   2.  How many drinks containing alcohol do you have on a typical day when you are drinking? 1 or 2   3.  How often do you have five or more drinks on one occasion? Less than monthly   4.  How often during the last year have you found that you were not able to stop drinking  once you had started? Never   5.  How often during the last year have you failed to do what was normally expected of you because of drinking? Never   6.  How often during the last year have you needed a first drink in the morning to get yourself going after a heavy drinking session? Never   7.  How often during the last year have you had a feeling of guilt or remorse after drinking? Never   8.  How often during the last year have you been unable to remember what happened the night before because of your drinking? Never   9.  Have you or someone else been injured because of your drinking? No   10. Has a relative, friend, doctor or other health care worker been concerned about your drinking or suggested you cut down? No   TOTAL SCORE 5       Last PSA:   PSA   Date Value Ref Range Status   09/28/2020 1.34 0 - 4 ug/L Final     Comment:     Assay Method:  Chemiluminescence using Siemens Vista analyzer     Reviewed orders with patient. Reviewed health maintenance and updated orders accordingly - Yes  BP Readings from Last 3 Encounters:   01/09/23 138/88   12/05/22 (!) 150/61   08/17/22 (!) 162/100    Wt Readings from Last 3 Encounters:   01/09/23 113.6 kg (250 lb 6.4 oz)   12/05/22 112.4 kg (247 lb 12.8 oz)   08/17/22 111.6 kg (246 lb)         Patient Active Problem List   Diagnosis     Chronic pain of right knee     Primary osteoarthritis of right knee     Past Surgical History:   Procedure Laterality Date     ENT SURGERY      Tonsils, wisdom teeth     GENITOURINARY SURGERY      vasectomy     HERNIORRHAPHY UMBILICAL N/A 11/8/2021    Procedure: HERNIORRHAPHY, UMBILICAL, OPEN;  Surgeon: Zay Bright DO;  Location: [facility]     LAPAROSCOPIC HERNIORRHAPHY INGUINAL Bilateral 11/8/2021    Procedure: HERNIORRHAPHY, INGUINAL, LAPAROSCOPIC; BILATERAL, UMBILICAL HERNIA REPAIR;  Surgeon: Zay Bright DO;  Location: [facility] OR     TONSILLECTOMY      as a child       Social History     Tobacco Use     Smoking status:  Never     Smokeless tobacco: Former     Types: Snuff   Substance Use Topics     Alcohol use: Yes     Family History   Problem Relation Age of Onset     Alzheimer Disease Mother      Cancer Mother      Other - See Comments Father         blood disease - myldisplasia     Cancer Paternal Grandmother      Cancer Paternal Grandfather      Alcoholism Brother          Current Outpatient Medications   Medication Sig Dispense Refill     atorvastatin (LIPITOR) 40 MG tablet TAKE 1 TABLET (40 MG) BY MOUTH DAILY 90 tablet 0     Cholecalciferol (VITAMIN D3 PO) Take by mouth daily       fish oil-omega-3 fatty acids 1000 MG capsule 1 cap(s)       Multiple Vitamin (MULTI VITAMIN) TABS 1 tab(s)       tadalafil (CIALIS) 5 MG tablet TAKE ONE TABLET BY MOUTH EVERY 24 HOURS 30 tablet 0     docusate sodium (COLACE) 100 MG capsule Take 1 capsule (100 mg) by mouth 2 times daily Take while on opiate to prevent constipation (Patient not taking: Reported on 1/9/2023)  0     niacin 250 MG CR capsule 1 cap(s) (Patient not taking: Reported on 1/9/2023)       Allergies   Allergen Reactions     Chocolate Diarrhea and Cramps     Cocoa      Other reaction(s): Abdominal cramping     Reviewed and updated as needed this visit by clinical staff    Allergies  Meds   Med Hx  Surg Hx  Fam Hx        Reviewed and updated as needed this visit by Provider                Review of Systems   Constitutional: Negative for chills and fever.   HENT: Negative for congestion, ear pain, hearing loss and sore throat.    Eyes: Negative for pain and visual disturbance.   Respiratory: Negative for cough and shortness of breath.    Cardiovascular: Negative for chest pain, palpitations and peripheral edema.   Gastrointestinal: Negative for abdominal pain, constipation, diarrhea, heartburn, hematochezia and nausea.   Genitourinary: Negative for dysuria, frequency, genital sores, hematuria, penile discharge and urgency.   Musculoskeletal: Positive for arthralgias. Negative  "for joint swelling and myalgias.   Skin: Negative for rash.   Neurological: Negative for dizziness, weakness, headaches and paresthesias.   Psychiatric/Behavioral: Negative for mood changes. The patient is not nervous/anxious.      CONSTITUTIONAL: NEGATIVE for fever, chills, change in weight  INTEGUMENTARY/SKIN: NEGATIVE for worrisome rashes, moles or lesions  EYES: NEGATIVE for vision changes or irritation  ENT: NEGATIVE for ear, mouth and throat problems  RESP: NEGATIVE for significant cough or SOB  CV: NEGATIVE for chest pain, palpitations or peripheral edema  GI: NEGATIVE for nausea, abdominal pain, heartburn, or change in bowel habits   male: negative for dysuria, hematuria, decreased urinary stream, erectile dysfunction, urethral discharge  MUSCULOSKELETAL: NEGATIVE for significant arthralgias or myalgia  NEURO: NEGATIVE for weakness, dizziness or paresthesias  PSYCHIATRIC: NEGATIVE for changes in mood or affect    OBJECTIVE:   /88   Pulse 69   Temp 98.5  F (36.9  C) (Tympanic)   Resp 18   Ht 1.854 m (6' 1\")   Wt 113.6 kg (250 lb 6.4 oz)   SpO2 94%   BMI 33.04 kg/m      Physical Exam  GENERAL: healthy, alert and no distress  NECK: no adenopathy, no asymmetry, masses, or scars and thyroid normal to palpation  RESP: lungs clear to auscultation - no rales, rhonchi or wheezes  CV: regular rate and rhythm, normal S1 S2, no S3 or S4, no murmur, click or rub, no peripheral edema and peripheral pulses strong  ABDOMEN: soft, nontender, no hepatosplenomegaly, no masses and bowel sounds normal  MS: no gross musculoskeletal defects noted, no edema    Diagnostic Test Results:  Labs reviewed in Epic    ASSESSMENT/PLAN:   (Z00.00) Routine general medical examination at a health care facility  (primary encounter diagnosis)      (M17.11) Primary osteoarthritis of right knee  Good control.  Continue currant medications, continue to monitor.      (E78.5) Hyperlipidemia LDL goal <100  Good control.  Continue " "currant medications, continue to monitor.      (E78.5) Hyperlipidemia LDL goal <100  Plan: Lipid panel reflex to direct LDL Fasting, ALT            (Z12.5) Screening for prostate cancer  Plan: PSA, screen      Patient has been advised of split billing requirements and indicates understanding: Yes    COUNSELING:   Reviewed preventive health counseling, as reflected in patient instructions       Regular exercise       Healthy diet/nutrition       Vision screening       Hearing screening       Colorectal cancer screening       Prostate cancer screening    BMI:   Estimated body mass index is 33.04 kg/m  as calculated from the following:    Height as of this encounter: 1.854 m (6' 1\").    Weight as of this encounter: 113.6 kg (250 lb 6.4 oz).     Weight management plan: Discussed healthy diet and exercise guidelines    He reports that he has never smoked. He has quit using smokeless tobacco.  His smokeless tobacco use included snuff.    Brian Perez MD  [facility]  "

## 2023-01-15 DIAGNOSIS — E78.5 HYPERLIPIDEMIA WITH TARGET LDL LESS THAN 160: ICD-10-CM

## 2023-01-15 RX ORDER — ATORVASTATIN CALCIUM 80 MG/1
80 TABLET, FILM COATED ORAL DAILY
Qty: 90 TABLET | Refills: 4 | Status: SHIPPED | OUTPATIENT
Start: 2023-01-15 | End: 2023-05-31

## 2023-02-19 DIAGNOSIS — N52.03 COMBINED ARTERIAL INSUFFICIENCY AND CORPORO-VENOUS OCCLUSIVE ERECTILE DYSFUNCTION: ICD-10-CM

## 2023-02-22 RX ORDER — TADALAFIL 5 MG/1
TABLET ORAL
Qty: 30 TABLET | Refills: 0 | Status: SHIPPED | OUTPATIENT
Start: 2023-02-22 | End: 2023-05-22

## 2023-04-10 NOTE — PROGRESS NOTES
CHIEF COMPLAINT:   Chief Complaint   Patient presents with     Right Knee - Pain     Last injection: 12/5/22. Injection worked good. Pain started to come back about the beginning of March. He is going on vacation in 3 weeks to the [place]. He would like to have another injection today.          HISTORY OF PRESENT ILLNESS    Lance Hogue is a 62 year old male seen for followup evaluation of ongoing right knee pain with no known injury. Last seen for injections 12/5/2022, 8/17/2022, 4/11/2022,  7/26/2021 and prior to that on 3/4/2021. Returns today stating the injection worked well fo 3 months or so. He'd like another injection today. He's heading up to the [place] for 3 weeks so wants the knee to be feeling good.      Pain has been present for ~4 years, or so, progressively getting worse. Pain is constant, day and night, gets worse as the day goes on. Aggravated with bumping the knee, catching the foot, prolonged weight bearing activities. Better with rest, elevation, pillow behind the knee. Treatment has been a brace at work, icy hot at night, repeated cortisone injections.    denies low back pain, denies numbness and tingling, denies hip pain.    Present symptoms: pain medially  and anteriorly, pain sharp, shooting, dull/achy , moderate pain, mild swelling.    Pain severity: 7/10  Frequency of symptoms: are constant  Exacerbating Factors: weight bearing, stairs down more than up, prolonged standing  Relieving Factors: rest, sitting  Night Pain: Yes  Pain while at rest: No   Numbness or tingling: No   Patient has tried:     NSAIDS: No      Physical Therapy: Yes      Activity modification: No      Bracing: Yes      Injections: Yes   12/5/2022,  8/17/2022, 12/2021, 7/2021, 3/4/2021, 10/2020      Ice: No      Assistive device:  No     Other: icy hot      Other PMH:  has a past medical history of Hyperlipidemia.  Patient Active Problem List   Diagnosis     Chronic pain of right knee     Primary  osteoarthritis of right knee       Surgical Hx:  has a past surgical history that includes ENT surgery; Abdomen surgery; tonsillectomy; Laparoscopic herniorrhaphy inguinal (Bilateral, 11/8/2021); and Herniorrhaphy umbilical (N/A, 11/8/2021).    Medications:   Current Outpatient Medications:      atorvastatin (LIPITOR) 80 MG tablet, Take 1 tablet (80 mg) by mouth daily, Disp: 90 tablet, Rfl: 4     Cholecalciferol (VITAMIN D3 PO), Take by mouth daily, Disp: , Rfl:      docusate sodium (COLACE) 100 MG capsule, Take 1 capsule (100 mg) by mouth 2 times daily Take while on opiate to prevent constipation (Patient not taking: Reported on 1/9/2023), Disp: , Rfl: 0     fish oil-omega-3 fatty acids 1000 MG capsule, 1 cap(s), Disp: , Rfl:      Multiple Vitamin (MULTI VITAMIN) TABS, 1 tab(s), Disp: , Rfl:      tadalafil (CIALIS) 5 MG tablet, TAKE ONE TABLET BY MOUTH EVERY 24 HOURS, Disp: 30 tablet, Rfl: 0    Current Facility-Administered Medications:      bupivacaine (MARCAINE) 0.25 % injection 4 mL, 4 mL, , , Flash Boggs MD, 4 mL at 12/05/22 1555     methylPREDNISolone (DEPO-MEDROL) injection 80 mg, 80 mg, , , Flash Boggs MD, 80 mg at 12/05/22 1555    Allergies:   Allergies   Allergen Reactions     Chocolate Diarrhea and Cramps     Cocoa      Other reaction(s): Abdominal cramping       Social Hx: .   reports that he has never smoked. He has quit using smokeless tobacco.  His smokeless tobacco use included snuff. He reports current alcohol use. He reports that he does not use drugs.    Family Hx: family history includes Alcoholism in his brother; Alzheimer Disease in his mother; Cancer in his mother, paternal grandfather, and paternal grandmother; Other - See Comments in his father.    REVIEW OF SYSTEMS:   CONSTITUTIONAL:NEGATIVE for fever, chills, change in weight  INTEGUMENTARY/SKIN: NEGATIVE for worrisome rashes, moles or lesions  MUSCULOSKELETAL:See HPI above  NEURO: NEGATIVE for weakness,  "dizziness or paresthesias    PHYSICAL EXAM:  /82   Pulse 71   Ht 1.854 m (6' 1\")   Wt 111.1 kg (245 lb)   BMI 32.32 kg/m     GENERAL APPEARANCE: healthy, alert, no distress   SKIN: no suspicious lesions or rashes  NEURO: Normal strength and tone, mentation intact and speech normal  PSYCH:  mentation appears normal and affect normal, not anxious  RESPIRATORY: No increased work of breathing.      BILATERAL LOWER EXTREMITIES:  Gait: slight favors the right.  Alignment: varus  No Quad atrophy, strength normal.  calf soft and nttp or squeeze.  Edema: trace      LEFT KNEE EXAM:    Skin: intact, no ecchymosis or erythema  ROM: full extension to 125 flexion  Tight hamstrings on straight leg raise.  Effusion: none  Tender: NTTP med/lat joint line, anterior or posterior knee  McMurrays: negative    MCL: stable, and non-painful at both 0 and 30 degrees knee flexion  Varus stress: stable, and non-painful at both 0 and 30 degrees knee flexion  Lachmans: neg, firm endpoint  Posterior Drawer stable  Patellofemoral joint:                Apprehension: negative              Crepitations: minimal      RIGHT KNEE EXAM:    Skin: intact, no ecchymosis or erythema  Squat: 100 %, not limited by pain.   causes diffuse discomfort.  ROM: full extension to 115 flexion, anterior and medial discomfort. Medial crepitus with range of motion.  Effusion: trace to small  Tender: severe medial joint line, posterior knee  NTTP lateral joint line.  McMurrays: positive medially.    MCL: stable, and non-painful at both 0 and 30 degrees knee flexion  Varus stress: stable, and non-painful at both 0 and 30 degrees knee flexion  Lachmans: neg, firm endpoint  Posterior Drawer stable  Patellofemoral joint:                Apprehension: negative              Crepitations: mild   Grind: positive.    X-RAY: no new images today. 3 views right knee from 10/28/2020 - no obvious fractures or dislocations. Moderate medial compartment narrowing with subchondral " "sclerosis, mild patello-femoral degenerative changes with marginal osteophytes.       ASSESSMENT/PLAN: Lance Hogue is a 62 year old male with chronic right knee pain, primary osteoarthritis.     * reviewed imaging studies with patient, showing arthritic changes, or wearing of the cartilage in the knee. This can be caused by normal \"wear and tear\" over the years or following prior injury to the knee.  * treatment usually nonsurgical to start, then can progress to surgical with total knee arthroplasty once nonsurgical management effective.    Non-surgical treatment for knee arthritis includes:    * rest, sitting  * Activity modification - avoid impact activities or activities that aggravate symptoms.  * NSAIDS (non-steroidal anti-inflammatory medications; e.g. Aleve, advil, motrin, ibuprofen) - regular use for inflammation ( twice daily or three times daily), with food, as long as no contra-indications Please discuss with primary care doctor if needed  * ice, 15-20 minutes at a time several times a day or as needed.  * Strengthening of quadriceps muscles  * Physical Therapy for strengthening, stretching and range of motion exercises of legs  * Tylenol as needed for pain, consider Tylenol arthritis or similar  * Weight loss: Weight loss:  Body mass index is 32.32 kg/m .. weight loss benefits, not only for the current pain symptoms, but also overall health. Recommend a good diet plan that works for the patient, with the assistance of a dietician or primary care doctor as needed. Also, a good, low-impact exercise program for at least 20 minutes per day, 3 times per week, such as exercise bike, elliptical , or pool.  * Exercise: low impact such as stationary bike, elliptical, pool.  * Injections: cortisone versus viscosupplementation (hyaluronic acid, \"rooster comb\", \"gel shots\"); risks and perceived benefits discussed today. Patient elects to proceed.  * Bracing: bracing the knee may offer some relief of " symptoms when worn and provide some stability.  * over the counter supplements such as glucosamine and chondroitin sulfate may help with joint pain.  * topical ointments may help as well    * return to clinic as needed.      Flash Boggs M.D., M.S.  Dept. of Orthopaedic Surgery  [facility]    Large Joint Injection/Arthocentesis: R knee joint    Date/Time: 2023 3:47 PM    Performed by: Flash Boggs MD  Authorized by: Flash Boggs MD    Indications:  Pain  Needle Size:  22 G  Guidance: landmark guided    Approach:  Anteromedial  Location:  Knee      Medications:  80 mg methylPREDNISolone 80 MG/ML  Outcome:  Tolerated well, no immediate complications  Procedure discussed: discussed risks, benefits, and alternatives    Consent Given by:  Patient  Timeout: timeout called immediately prior to procedure    Prep: patient was prepped and draped in usual sterile fashion     4 CC of 0.25% Sensorcaine NDC 93230-020-50, LOT [id number],

## 2023-04-12 ENCOUNTER — OFFICE VISIT (OUTPATIENT)
Dept: ORTHOPEDICS | Facility: CLINIC | Age: 63
End: 2023-04-12
Payer: COMMERCIAL

## 2023-04-12 VITALS
HEART RATE: 71 BPM | DIASTOLIC BLOOD PRESSURE: 82 MMHG | BODY MASS INDEX: 32.47 KG/M2 | SYSTOLIC BLOOD PRESSURE: 138 MMHG | WEIGHT: 245 LBS | HEIGHT: 73 IN

## 2023-04-12 DIAGNOSIS — M25.561 CHRONIC PAIN OF RIGHT KNEE: ICD-10-CM

## 2023-04-12 DIAGNOSIS — G89.29 CHRONIC PAIN OF RIGHT KNEE: ICD-10-CM

## 2023-04-12 DIAGNOSIS — M17.11 PRIMARY OSTEOARTHRITIS OF RIGHT KNEE: Primary | ICD-10-CM

## 2023-04-12 PROCEDURE — 20610 DRAIN/INJ JOINT/BURSA W/O US: CPT | Mod: RT | Performed by: ORTHOPAEDIC SURGERY

## 2023-04-12 RX ORDER — METHYLPREDNISOLONE ACETATE 80 MG/ML
80 INJECTION, SUSPENSION INTRA-ARTICULAR; INTRALESIONAL; INTRAMUSCULAR; SOFT TISSUE
Status: DISCONTINUED | OUTPATIENT
Start: 2023-04-12 | End: 2024-02-19

## 2023-04-12 RX ADMIN — METHYLPREDNISOLONE ACETATE 80 MG: 80 INJECTION, SUSPENSION INTRA-ARTICULAR; INTRALESIONAL; INTRAMUSCULAR; SOFT TISSUE at 15:47

## 2023-04-12 ASSESSMENT — PAIN SCALES - GENERAL: PAINLEVEL: SEVERE PAIN (7)

## 2023-04-12 NOTE — LETTER
4/12/2023         RE: Lance Hogue  [address]  [facility] [address]        Dear Colleague,    Thank you for referring your patient, Lance Hogue, to the [facility]. Please see a copy of my visit note below.    CHIEF COMPLAINT:   Chief Complaint   Patient presents with     Right Knee - Pain     Last injection: 12/5/22. Injection worked good. Pain started to come back about the beginning of March. He is going on vacation in 3 weeks to the [place]. He would like to have another injection today.          HISTORY OF PRESENT ILLNESS    Lance Hogue is a 62 year old male seen for followup evaluation of ongoing right knee pain with no known injury. Last seen for injections 12/5/2022, 8/17/2022, 4/11/2022,  7/26/2021 and prior to that on 3/4/2021. Returns today stating the injection worked well fo 3 months or so. He'd like another injection today. He's heading up to the [place] for 3 weeks so wants the knee to be feeling good.      Pain has been present for ~4 years, or so, progressively getting worse. Pain is constant, day and night, gets worse as the day goes on. Aggravated with bumping the knee, catching the foot, prolonged weight bearing activities. Better with rest, elevation, pillow behind the knee. Treatment has been a brace at work, icy hot at night, repeated cortisone injections.    denies low back pain, denies numbness and tingling, denies hip pain.    Present symptoms: pain medially  and anteriorly, pain sharp, shooting, dull/achy , moderate pain, mild swelling.    Pain severity: 7/10  Frequency of symptoms: are constant  Exacerbating Factors: weight bearing, stairs down more than up, prolonged standing  Relieving Factors: rest, sitting  Night Pain: Yes  Pain while at rest: No   Numbness or tingling: No   Patient has tried:     NSAIDS: No      Physical Therapy: Yes      Activity modification: No      Bracing: Yes      Injections: Yes   12/5/2022,   8/17/2022, 12/2021, 7/2021, 3/4/2021, 10/2020      Ice: No      Assistive device:  No     Other: icy hot      Other PMH:  has a past medical history of Hyperlipidemia.  Patient Active Problem List   Diagnosis     Chronic pain of right knee     Primary osteoarthritis of right knee       Surgical Hx:  has a past surgical history that includes ENT surgery; Abdomen surgery; tonsillectomy; Laparoscopic herniorrhaphy inguinal (Bilateral, 11/8/2021); and Herniorrhaphy umbilical (N/A, 11/8/2021).    Medications:   Current Outpatient Medications:      atorvastatin (LIPITOR) 80 MG tablet, Take 1 tablet (80 mg) by mouth daily, Disp: 90 tablet, Rfl: 4     Cholecalciferol (VITAMIN D3 PO), Take by mouth daily, Disp: , Rfl:      docusate sodium (COLACE) 100 MG capsule, Take 1 capsule (100 mg) by mouth 2 times daily Take while on opiate to prevent constipation (Patient not taking: Reported on 1/9/2023), Disp: , Rfl: 0     fish oil-omega-3 fatty acids 1000 MG capsule, 1 cap(s), Disp: , Rfl:      Multiple Vitamin (MULTI VITAMIN) TABS, 1 tab(s), Disp: , Rfl:      tadalafil (CIALIS) 5 MG tablet, TAKE ONE TABLET BY MOUTH EVERY 24 HOURS, Disp: 30 tablet, Rfl: 0    Current Facility-Administered Medications:      bupivacaine (MARCAINE) 0.25 % injection 4 mL, 4 mL, , , Flash Boggs MD, 4 mL at 12/05/22 1555     methylPREDNISolone (DEPO-MEDROL) injection 80 mg, 80 mg, , , Flash Boggs MD, 80 mg at 12/05/22 1555    Allergies:   Allergies   Allergen Reactions     Chocolate Diarrhea and Cramps     Cocoa      Other reaction(s): Abdominal cramping       Social Hx: .   reports that he has never smoked. He has quit using smokeless tobacco.  His smokeless tobacco use included snuff. He reports current alcohol use. He reports that he does not use drugs.    Family Hx: family history includes Alcoholism in his brother; Alzheimer Disease in his mother; Cancer in his mother, paternal grandfather, and paternal grandmother;  "Other - See Comments in his father.    REVIEW OF SYSTEMS:   CONSTITUTIONAL:NEGATIVE for fever, chills, change in weight  INTEGUMENTARY/SKIN: NEGATIVE for worrisome rashes, moles or lesions  MUSCULOSKELETAL:See HPI above  NEURO: NEGATIVE for weakness, dizziness or paresthesias    PHYSICAL EXAM:  /82   Pulse 71   Ht 1.854 m (6' 1\")   Wt 111.1 kg (245 lb)   BMI 32.32 kg/m     GENERAL APPEARANCE: healthy, alert, no distress   SKIN: no suspicious lesions or rashes  NEURO: Normal strength and tone, mentation intact and speech normal  PSYCH:  mentation appears normal and affect normal, not anxious  RESPIRATORY: No increased work of breathing.      BILATERAL LOWER EXTREMITIES:  Gait: slight favors the right.  Alignment: varus  No Quad atrophy, strength normal.  calf soft and nttp or squeeze.  Edema: trace      LEFT KNEE EXAM:    Skin: intact, no ecchymosis or erythema  ROM: full extension to 125 flexion  Tight hamstrings on straight leg raise.  Effusion: none  Tender: NTTP med/lat joint line, anterior or posterior knee  McMurrays: negative    MCL: stable, and non-painful at both 0 and 30 degrees knee flexion  Varus stress: stable, and non-painful at both 0 and 30 degrees knee flexion  Lachmans: neg, firm endpoint  Posterior Drawer stable  Patellofemoral joint:                Apprehension: negative              Crepitations: minimal      RIGHT KNEE EXAM:    Skin: intact, no ecchymosis or erythema  Squat: 100 %, not limited by pain.   causes diffuse discomfort.  ROM: full extension to 115 flexion, anterior and medial discomfort. Medial crepitus with range of motion.  Effusion: trace to small  Tender: severe medial joint line, posterior knee  NTTP lateral joint line.  McMurrays: positive medially.    MCL: stable, and non-painful at both 0 and 30 degrees knee flexion  Varus stress: stable, and non-painful at both 0 and 30 degrees knee flexion  Lachmans: neg, firm endpoint  Posterior Drawer stable  Patellofemoral " "joint:                Apprehension: negative              Crepitations: mild   Grind: positive.    X-RAY: no new images today. 3 views right knee from 10/28/2020 - no obvious fractures or dislocations. Moderate medial compartment narrowing with subchondral sclerosis, mild patello-femoral degenerative changes with marginal osteophytes.       ASSESSMENT/PLAN: Lance Hogue is a 62 year old male with chronic right knee pain, primary osteoarthritis.     * reviewed imaging studies with patient, showing arthritic changes, or wearing of the cartilage in the knee. This can be caused by normal \"wear and tear\" over the years or following prior injury to the knee.  * treatment usually nonsurgical to start, then can progress to surgical with total knee arthroplasty once nonsurgical management effective.    Non-surgical treatment for knee arthritis includes:    * rest, sitting  * Activity modification - avoid impact activities or activities that aggravate symptoms.  * NSAIDS (non-steroidal anti-inflammatory medications; e.g. Aleve, advil, motrin, ibuprofen) - regular use for inflammation ( twice daily or three times daily), with food, as long as no contra-indications Please discuss with primary care doctor if needed  * ice, 15-20 minutes at a time several times a day or as needed.  * Strengthening of quadriceps muscles  * Physical Therapy for strengthening, stretching and range of motion exercises of legs  * Tylenol as needed for pain, consider Tylenol arthritis or similar  * Weight loss: Weight loss:  Body mass index is 32.32 kg/m .. weight loss benefits, not only for the current pain symptoms, but also overall health. Recommend a good diet plan that works for the patient, with the assistance of a dietician or primary care doctor as needed. Also, a good, low-impact exercise program for at least 20 minutes per day, 3 times per week, such as exercise bike, elliptical , or pool.  * Exercise: low impact such as " "stationary bike, elliptical, pool.  * Injections: cortisone versus viscosupplementation (hyaluronic acid, \"rooster comb\", \"gel shots\"); risks and perceived benefits discussed today. Patient elects to proceed.  * Bracing: bracing the knee may offer some relief of symptoms when worn and provide some stability.  * over the counter supplements such as glucosamine and chondroitin sulfate may help with joint pain.  * topical ointments may help as well    * return to clinic as needed.      Flash Boggs M.D., M.S.  Dept. of Orthopaedic Surgery  [facility]    Large Joint Injection/Arthocentesis: R knee joint    Date/Time: 2023 3:47 PM    Performed by: Flash Boggs MD  Authorized by: Flash Boggs MD    Indications:  Pain  Needle Size:  22 G  Guidance: landmark guided    Approach:  Anteromedial  Location:  Knee      Medications:  80 mg methylPREDNISolone 80 MG/ML  Outcome:  Tolerated well, no immediate complications  Procedure discussed: discussed risks, benefits, and alternatives    Consent Given by:  Patient  Timeout: timeout called immediately prior to procedure    Prep: patient was prepped and draped in usual sterile fashion     4 CC of 0.25% Sensorcaine NDC 77932-554-99, LOT [id number],              Again, thank you for allowing me to participate in the care of your patient.        Sincerely,        Flash Boggs MD    "

## 2023-05-19 DIAGNOSIS — N52.03 COMBINED ARTERIAL INSUFFICIENCY AND CORPORO-VENOUS OCCLUSIVE ERECTILE DYSFUNCTION: ICD-10-CM

## 2023-05-19 NOTE — TELEPHONE ENCOUNTER
Patient: Karo Lindsay    Procedure(s):  Take Down Of Urinary Conduit,Creation of a New Conduit ,Looposcopy, Extensive Lysis of Adhesions and Left Ureteral Stent Exchange    Diagnosis: Stenosis Of Ileal Conduit Stoma  Diagnosis Additional Information: No value filed.    Anesthesia Type:   No value filed.     Note:  Airway :Nasal Cannula  Patient transferred to:PACU  Comments: VSS. Breathing spontaneously at a regular rate with adequate tidal volumes and maintaining O2 sats on 3L NC. Denies nausea or pain. No apparent complications from anesthesia.     Klarissa Gallagher MD  [facility] Anesthesia Resident  Handoff Report: Identifed the Patient, Identified the Reponsible Provider, Reviewed the pertinent medical history, Discussed the surgical course, Reviewed Intra-OP anesthesia mangement and issues during anesthesia, Set expectations for post-procedure period and Allowed opportunity for questions and acknowledgement of understanding      Vitals: (Last set prior to Anesthesia Care Transfer)    [facility] VITALS  7/10/2019 2005 - 7/10/2019 2045      7/10/2019             Resp Rate (observed):  --                Electronically Signed By: Klarissa Molina MD  July 10, 2019  8:45 PM   Routing refill request to provider for review/approval because:  PCP to determine.  Eunice Calloway RN on 5/19/2023 at 3:24 PM

## 2023-05-22 RX ORDER — TADALAFIL 5 MG/1
TABLET ORAL
Qty: 30 TABLET | Refills: 0 | Status: SHIPPED | OUTPATIENT
Start: 2023-05-22 | End: 2023-08-30

## 2023-08-20 NOTE — PROGRESS NOTES
CHIEF COMPLAINT:   Chief Complaint   Patient presents with    Right Knee - Pain     Last injection: 4/12/23. Injection worked good. Pain started to come back about 6 weeks ago.He would like to have another injection today.          HISTORY OF PRESENT ILLNESS    Lance Hogue is a 62 year old male seen for followup evaluation of ongoing right knee pain with no known injury. Last seen for injections 4/12/2023. Returns today stating the injection worked well fo 3 months or so. He'd like another injection today.       Pain has been present for ~4-5 years, or so, progressively getting worse. Pain is constant, day and night, gets worse as the day goes on. Aggravated with bumping the knee, catching the foot, prolonged weight bearing activities. Better with rest, elevation, pillow behind the knee. Treatment has been a brace at work, icy hot at night, repeated cortisone injections.    denies low back pain, denies numbness and tingling, denies hip pain.    Present symptoms: pain medially  and anteriorly, pain sharp, shooting, dull/achy , moderate pain, mild swelling.    Pain severity: 8/10  Frequency of symptoms: are constant  Exacerbating Factors: weight bearing, stairs down more than up, prolonged standing  Relieving Factors: rest, sitting  Night Pain: Yes  Pain while at rest: No   Numbness or tingling: No   Patient has tried:     NSAIDS: No      Physical Therapy: Yes      Activity modification: No      Bracing: Yes      Injections: Yes   4/2023 most recently.     Ice: No      Assistive device:  No     Other: icy hot      Other PMH:  has a past medical history of Hyperlipidemia.  Patient Active Problem List   Diagnosis    Chronic pain of right knee    Primary osteoarthritis of right knee       Surgical Hx:  has a past surgical history that includes ENT surgery; Abdomen surgery; tonsillectomy; Laparoscopic herniorrhaphy inguinal (Bilateral, 11/8/2021); and Herniorrhaphy umbilical (N/A, 11/8/2021).    Medications:  "  Current Outpatient Medications:     atorvastatin (LIPITOR) 80 MG tablet, Take 1 tablet (80 mg) by mouth daily, Disp: 90 tablet, Rfl: 1    Cholecalciferol (VITAMIN D3 PO), Take by mouth daily, Disp: , Rfl:     docusate sodium (COLACE) 100 MG capsule, Take 1 capsule (100 mg) by mouth 2 times daily Take while on opiate to prevent constipation (Patient not taking: Reported on 1/9/2023), Disp: , Rfl: 0    fish oil-omega-3 fatty acids 1000 MG capsule, 1 cap(s), Disp: , Rfl:     Multiple Vitamin (MULTI VITAMIN) TABS, 1 tab(s), Disp: , Rfl:     tadalafil (CIALIS) 5 MG tablet, TAKE ONE TABLET BY MOUTH EVERY 24 HOURS, Disp: 30 tablet, Rfl: 0    Current Facility-Administered Medications:     methylPREDNISolone (DEPO-MEDROL) injection 80 mg, 80 mg, , , Flash Boggs MD, 80 mg at 04/12/23 2223    Allergies:   Allergies   Allergen Reactions    Chocolate Diarrhea and Cramps    Cocoa      Other reaction(s): Abdominal cramping       Social Hx: .   reports that he has never smoked. He has quit using smokeless tobacco.  His smokeless tobacco use included snuff. He reports current alcohol use. He reports that he does not use drugs.    Family Hx: family history includes Alcoholism in his brother; Alzheimer Disease in his mother; Cancer in his mother, paternal grandfather, and paternal grandmother; Other - See Comments in his father.    REVIEW OF SYSTEMS:   CONSTITUTIONAL:NEGATIVE for fever, chills, change in weight  INTEGUMENTARY/SKIN: NEGATIVE for worrisome rashes, moles or lesions  MUSCULOSKELETAL:See HPI above  NEURO: NEGATIVE for weakness, dizziness or paresthesias    PHYSICAL EXAM:  BP (!) 171/96   Pulse 89   Resp 18   Ht 1.854 m (6' 1\")   Wt 111.1 kg (245 lb)   BMI 32.32 kg/m     GENERAL APPEARANCE: healthy, alert, no distress   SKIN: no suspicious lesions or rashes  NEURO: Normal strength and tone, mentation intact and speech normal  PSYCH:  mentation appears normal and affect normal, not " anxious  RESPIRATORY: No increased work of breathing.      BILATERAL LOWER EXTREMITIES:  Gait: slight favors the right.  Alignment: varus  No Quad atrophy, strength normal.  calf soft and nttp or squeeze.  Edema: 1+ bilateral.      LEFT KNEE EXAM:    Skin: intact, no ecchymosis or erythema  ROM: full extension to 125 flexion  Tight hamstrings on straight leg raise.  Effusion: none  Tender: NTTP med/lat joint line, anterior or posterior knee  McMurrays: negative    MCL: stable, and non-painful at both 0 and 30 degrees knee flexion  Varus stress: stable, and non-painful at both 0 and 30 degrees knee flexion  Lachmans: neg, firm endpoint  Posterior Drawer stable  Patellofemoral joint:                Apprehension: negative              Crepitations: minimal      RIGHT KNEE EXAM:    Skin: intact, no ecchymosis or erythema  Squat: 100 %, not limited by pain.   causes diffuse discomfort.  ROM: full extension to 115 flexion, anterior and medial discomfort. Medial crepitus with range of motion.  Effusion: trace to small  Tender: severe medial joint line, posterior knee  NTTP lateral joint line.  McMurrays: positive medially.    MCL: stable, and non-painful at both 0 and 30 degrees knee flexion  Varus stress: stable, and non-painful at both 0 and 30 degrees knee flexion  Lachmans: neg, firm endpoint  Posterior Drawer stable  Patellofemoral joint:                Apprehension: negative              Crepitations: mild   Grind: positive.    X-RAY: no new images today. 3 views right knee from 10/28/2020 - no obvious fractures or dislocations. Moderate medial compartment narrowing with subchondral sclerosis, mild patello-femoral degenerative changes with marginal osteophytes.       ASSESSMENT/PLAN: Lance Hogue is a 62 year old male with chronic right knee pain, primary osteoarthritis.     * reviewed imaging studies with patient, showing arthritic changes, or wearing of the cartilage in the knee. This can be caused by normal  "\"wear and tear\" over the years or following prior injury to the knee.  * treatment usually nonsurgical to start, then can progress to surgical with total knee arthroplasty once nonsurgical management effective.    Non-surgical treatment for knee arthritis includes:    * rest, sitting  * Activity modification - avoid impact activities or activities that aggravate symptoms.  * NSAIDS (non-steroidal anti-inflammatory medications; e.g. Aleve, advil, motrin, ibuprofen) - regular use for inflammation ( twice daily or three times daily), with food, as long as no contra-indications Please discuss with primary care doctor if needed  * ice, 15-20 minutes at a time several times a day or as needed.  * Strengthening of quadriceps muscles  * Physical Therapy for strengthening, stretching and range of motion exercises of legs  * Tylenol as needed for pain, consider Tylenol arthritis or similar  * Weight loss: Weight loss:  Body mass index is 32.32 kg/m .. weight loss benefits, not only for the current pain symptoms, but also overall health. Recommend a good diet plan that works for the patient, with the assistance of a dietician or primary care doctor as needed. Also, a good, low-impact exercise program for at least 20 minutes per day, 3 times per week, such as exercise bike, elliptical , or pool.  * Exercise: low impact such as stationary bike, elliptical, pool.  * Injections: cortisone versus viscosupplementation (hyaluronic acid, \"rooster comb\", \"gel shots\"); risks and perceived benefits discussed today. Patient elects to proceed.  * Bracing: bracing the knee may offer some relief of symptoms when worn and provide some stability.  * over the counter supplements such as glucosamine and chondroitin sulfate may help with joint pain.  * topical ointments may help as well    * return to clinic as needed.      Flash Boggs M.D., M.S.  Dept. of Orthopaedic Surgery  [facility]    Large Joint " Injection/Arthocentesis: R knee joint    Date/Time: 2023 3:56 PM    Performed by: Flash Boggs MD  Authorized by: Flash Boggs MD    Indications:  Pain  Needle Size:  22 G  Guidance: landmark guided    Approach:  Anteromedial  Location:  Knee      Medications:  80 mg methylPREDNISolone 80 MG/ML  Outcome:  Tolerated well, no immediate complications  Procedure discussed: discussed risks, benefits, and alternatives    Consent Given by:  Patient  Timeout: timeout called immediately prior to procedure    Prep: patient was prepped and draped in usual sterile fashion     4cc of 0.25%Bupivacaine NDC 10197-206-03, LOT [id number],  DEC 2025

## 2023-08-23 ENCOUNTER — OFFICE VISIT (OUTPATIENT)
Dept: ORTHOPEDICS | Facility: CLINIC | Age: 63
End: 2023-08-23
Payer: COMMERCIAL

## 2023-08-23 VITALS
RESPIRATION RATE: 18 BRPM | HEIGHT: 73 IN | DIASTOLIC BLOOD PRESSURE: 96 MMHG | WEIGHT: 245 LBS | SYSTOLIC BLOOD PRESSURE: 171 MMHG | HEART RATE: 89 BPM | BODY MASS INDEX: 32.47 KG/M2

## 2023-08-23 DIAGNOSIS — G89.29 CHRONIC PAIN OF RIGHT KNEE: ICD-10-CM

## 2023-08-23 DIAGNOSIS — M17.11 PRIMARY OSTEOARTHRITIS OF RIGHT KNEE: Primary | ICD-10-CM

## 2023-08-23 DIAGNOSIS — M25.561 CHRONIC PAIN OF RIGHT KNEE: ICD-10-CM

## 2023-08-23 PROCEDURE — 20610 DRAIN/INJ JOINT/BURSA W/O US: CPT | Mod: RT | Performed by: ORTHOPAEDIC SURGERY

## 2023-08-23 RX ORDER — METHYLPREDNISOLONE ACETATE 80 MG/ML
80 INJECTION, SUSPENSION INTRA-ARTICULAR; INTRALESIONAL; INTRAMUSCULAR; SOFT TISSUE
Status: DISCONTINUED | OUTPATIENT
Start: 2023-08-23 | End: 2024-02-19

## 2023-08-23 RX ADMIN — METHYLPREDNISOLONE ACETATE 80 MG: 80 INJECTION, SUSPENSION INTRA-ARTICULAR; INTRALESIONAL; INTRAMUSCULAR; SOFT TISSUE at 15:56

## 2023-08-23 ASSESSMENT — PAIN SCALES - GENERAL: PAINLEVEL: EXTREME PAIN (8)

## 2023-08-23 NOTE — LETTER
8/23/2023         RE: Lance Hogue  [address]  [facility] [address]        Dear Colleague,    Thank you for referring your patient, Lance Hogue, to the [facility]. Please see a copy of my visit note below.      CHIEF COMPLAINT:   Chief Complaint   Patient presents with     Right Knee - Pain     Last injection: 4/12/23. Injection worked good. Pain started to come back about 6 weeks ago.He would like to have another injection today.          HISTORY OF PRESENT ILLNESS    Lance Hogue is a 62 year old male seen for followup evaluation of ongoing right knee pain with no known injury. Last seen for injections 4/12/2023. Returns today stating the injection worked well fo 3 months or so. He'd like another injection today.       Pain has been present for ~4-5 years, or so, progressively getting worse. Pain is constant, day and night, gets worse as the day goes on. Aggravated with bumping the knee, catching the foot, prolonged weight bearing activities. Better with rest, elevation, pillow behind the knee. Treatment has been a brace at work, icy hot at night, repeated cortisone injections.    denies low back pain, denies numbness and tingling, denies hip pain.    Present symptoms: pain medially  and anteriorly, pain sharp, shooting, dull/achy , moderate pain, mild swelling.    Pain severity: 8/10  Frequency of symptoms: are constant  Exacerbating Factors: weight bearing, stairs down more than up, prolonged standing  Relieving Factors: rest, sitting  Night Pain: Yes  Pain while at rest: No   Numbness or tingling: No   Patient has tried:     NSAIDS: No      Physical Therapy: Yes      Activity modification: No      Bracing: Yes      Injections: Yes   4/2023 most recently.     Ice: No      Assistive device:  No     Other: icy hot      Other PMH:  has a past medical history of Hyperlipidemia.  Patient Active Problem List   Diagnosis     Chronic pain of right knee      Primary osteoarthritis of right knee       Surgical Hx:  has a past surgical history that includes ENT surgery; Abdomen surgery; tonsillectomy; Laparoscopic herniorrhaphy inguinal (Bilateral, 11/8/2021); and Herniorrhaphy umbilical (N/A, 11/8/2021).    Medications:   Current Outpatient Medications:      atorvastatin (LIPITOR) 80 MG tablet, Take 1 tablet (80 mg) by mouth daily, Disp: 90 tablet, Rfl: 1     Cholecalciferol (VITAMIN D3 PO), Take by mouth daily, Disp: , Rfl:      docusate sodium (COLACE) 100 MG capsule, Take 1 capsule (100 mg) by mouth 2 times daily Take while on opiate to prevent constipation (Patient not taking: Reported on 1/9/2023), Disp: , Rfl: 0     fish oil-omega-3 fatty acids 1000 MG capsule, 1 cap(s), Disp: , Rfl:      Multiple Vitamin (MULTI VITAMIN) TABS, 1 tab(s), Disp: , Rfl:      tadalafil (CIALIS) 5 MG tablet, TAKE ONE TABLET BY MOUTH EVERY 24 HOURS, Disp: 30 tablet, Rfl: 0    Current Facility-Administered Medications:      methylPREDNISolone (DEPO-MEDROL) injection 80 mg, 80 mg, , , Flash Boggs MD, 80 mg at 04/12/23 6372    Allergies:   Allergies   Allergen Reactions     Chocolate Diarrhea and Cramps     Cocoa      Other reaction(s): Abdominal cramping       Social Hx: .   reports that he has never smoked. He has quit using smokeless tobacco.  His smokeless tobacco use included snuff. He reports current alcohol use. He reports that he does not use drugs.    Family Hx: family history includes Alcoholism in his brother; Alzheimer Disease in his mother; Cancer in his mother, paternal grandfather, and paternal grandmother; Other - See Comments in his father.    REVIEW OF SYSTEMS:   CONSTITUTIONAL:NEGATIVE for fever, chills, change in weight  INTEGUMENTARY/SKIN: NEGATIVE for worrisome rashes, moles or lesions  MUSCULOSKELETAL:See HPI above  NEURO: NEGATIVE for weakness, dizziness or paresthesias    PHYSICAL EXAM:  BP (!) 171/96   Pulse 89   Resp 18   Ht 1.854 m (6'  "1\")   Wt 111.1 kg (245 lb)   BMI 32.32 kg/m     GENERAL APPEARANCE: healthy, alert, no distress   SKIN: no suspicious lesions or rashes  NEURO: Normal strength and tone, mentation intact and speech normal  PSYCH:  mentation appears normal and affect normal, not anxious  RESPIRATORY: No increased work of breathing.      BILATERAL LOWER EXTREMITIES:  Gait: slight favors the right.  Alignment: varus  No Quad atrophy, strength normal.  calf soft and nttp or squeeze.  Edema: 1+ bilateral.      LEFT KNEE EXAM:    Skin: intact, no ecchymosis or erythema  ROM: full extension to 125 flexion  Tight hamstrings on straight leg raise.  Effusion: none  Tender: NTTP med/lat joint line, anterior or posterior knee  McMurrays: negative    MCL: stable, and non-painful at both 0 and 30 degrees knee flexion  Varus stress: stable, and non-painful at both 0 and 30 degrees knee flexion  Lachmans: neg, firm endpoint  Posterior Drawer stable  Patellofemoral joint:                Apprehension: negative              Crepitations: minimal      RIGHT KNEE EXAM:    Skin: intact, no ecchymosis or erythema  Squat: 100 %, not limited by pain.   causes diffuse discomfort.  ROM: full extension to 115 flexion, anterior and medial discomfort. Medial crepitus with range of motion.  Effusion: trace to small  Tender: severe medial joint line, posterior knee  NTTP lateral joint line.  McMurrays: positive medially.    MCL: stable, and non-painful at both 0 and 30 degrees knee flexion  Varus stress: stable, and non-painful at both 0 and 30 degrees knee flexion  Lachmans: neg, firm endpoint  Posterior Drawer stable  Patellofemoral joint:                Apprehension: negative              Crepitations: mild   Grind: positive.    X-RAY: no new images today. 3 views right knee from 10/28/2020 - no obvious fractures or dislocations. Moderate medial compartment narrowing with subchondral sclerosis, mild patello-femoral degenerative changes with marginal " "osteophytes.       ASSESSMENT/PLAN: Lance Hogue is a 62 year old male with chronic right knee pain, primary osteoarthritis.     * reviewed imaging studies with patient, showing arthritic changes, or wearing of the cartilage in the knee. This can be caused by normal \"wear and tear\" over the years or following prior injury to the knee.  * treatment usually nonsurgical to start, then can progress to surgical with total knee arthroplasty once nonsurgical management effective.    Non-surgical treatment for knee arthritis includes:    * rest, sitting  * Activity modification - avoid impact activities or activities that aggravate symptoms.  * NSAIDS (non-steroidal anti-inflammatory medications; e.g. Aleve, advil, motrin, ibuprofen) - regular use for inflammation ( twice daily or three times daily), with food, as long as no contra-indications Please discuss with primary care doctor if needed  * ice, 15-20 minutes at a time several times a day or as needed.  * Strengthening of quadriceps muscles  * Physical Therapy for strengthening, stretching and range of motion exercises of legs  * Tylenol as needed for pain, consider Tylenol arthritis or similar  * Weight loss: Weight loss:  Body mass index is 32.32 kg/m .. weight loss benefits, not only for the current pain symptoms, but also overall health. Recommend a good diet plan that works for the patient, with the assistance of a dietician or primary care doctor as needed. Also, a good, low-impact exercise program for at least 20 minutes per day, 3 times per week, such as exercise bike, elliptical , or pool.  * Exercise: low impact such as stationary bike, elliptical, pool.  * Injections: cortisone versus viscosupplementation (hyaluronic acid, \"rooster comb\", \"gel shots\"); risks and perceived benefits discussed today. Patient elects to proceed.  * Bracing: bracing the knee may offer some relief of symptoms when worn and provide some stability.  * over the counter " supplements such as glucosamine and chondroitin sulfate may help with joint pain.  * topical ointments may help as well    * return to clinic as needed.      Flash Boggs M.D., M.S.  Dept. of Orthopaedic Surgery  [facility]    Large Joint Injection/Arthocentesis: R knee joint    Date/Time: 2023 3:56 PM    Performed by: Flash Boggs MD  Authorized by: Flash Boggs MD    Indications:  Pain  Needle Size:  22 G  Guidance: landmark guided    Approach:  Anteromedial  Location:  Knee      Medications:  80 mg methylPREDNISolone 80 MG/ML  Outcome:  Tolerated well, no immediate complications  Procedure discussed: discussed risks, benefits, and alternatives    Consent Given by:  Patient  Timeout: timeout called immediately prior to procedure    Prep: patient was prepped and draped in usual sterile fashion     4cc of 0.25%Bupivacaine NDC 88487-226-93, LOT [id number],  DEC 2025        Again, thank you for allowing me to participate in the care of your patient.        Sincerely,        Flash Boggs MD

## 2023-08-30 DIAGNOSIS — N52.03 COMBINED ARTERIAL INSUFFICIENCY AND CORPORO-VENOUS OCCLUSIVE ERECTILE DYSFUNCTION: ICD-10-CM

## 2023-08-30 RX ORDER — TADALAFIL 5 MG/1
TABLET ORAL
Qty: 30 TABLET | Refills: 0 | Status: SHIPPED | OUTPATIENT
Start: 2023-08-30 | End: 2023-11-06

## 2023-10-10 DIAGNOSIS — E78.5 HYPERLIPIDEMIA WITH TARGET LDL LESS THAN 160: ICD-10-CM

## 2023-10-11 RX ORDER — ATORVASTATIN CALCIUM 80 MG/1
80 TABLET, FILM COATED ORAL DAILY
Qty: 90 TABLET | Refills: 3 | OUTPATIENT
Start: 2023-10-11

## 2023-11-06 DIAGNOSIS — N52.03 COMBINED ARTERIAL INSUFFICIENCY AND CORPORO-VENOUS OCCLUSIVE ERECTILE DYSFUNCTION: ICD-10-CM

## 2023-11-06 RX ORDER — TADALAFIL 5 MG/1
TABLET ORAL
Qty: 30 TABLET | Refills: 1 | Status: SHIPPED | OUTPATIENT
Start: 2023-11-06 | End: 2024-04-23

## 2023-11-06 NOTE — TELEPHONE ENCOUNTER
"Prescription approved per [facility] Refill Protocol.       Requested Prescriptions   Pending Prescriptions Disp Refills    tadalafil (CIALIS) 5 MG tablet [Pharmacy Med Name: Tadalafil Oral Tablet 5 MG] 30 tablet 1     Sig: TAKE ONE TABLET BY MOUTH ONE TIME DAILY       Erectile Dysfuction Protocol Passed - 11/6/2023  4:20 PM        Passed - Absence of nitrates on medication list        Passed - Absence of Alpha Blockers on Med list        Passed - Recent (12 mo) or future (30 days) visit within the authorizing provider's specialty     Patient has had an office visit with the authorizing provider or a provider within the authorizing providers department within the previous 12 mos or has a future within next 30 days. See \"Patient Info\" tab in inbasket, or \"Choose Columns\" in Meds & Orders section of the refill encounter.              Passed - Medication is active on med list        Passed - Patient is age 18 or older                 Eunice Calloway RN 11/06/23 5:39 PM   "

## 2023-11-22 ENCOUNTER — TELEPHONE (OUTPATIENT)
Dept: FAMILY MEDICINE | Facility: CLINIC | Age: 63
End: 2023-11-22
Payer: COMMERCIAL

## 2023-11-22 DIAGNOSIS — E78.5 HYPERLIPIDEMIA WITH TARGET LDL LESS THAN 160: ICD-10-CM

## 2023-11-22 NOTE — TELEPHONE ENCOUNTER
Please call patient. He had a pe in 1/2023. He should reschedule with Dr. CURRY in January. If he has an acute issue that he would like to discuss, that is fine but he should still schedule his annual with Dr. CURRY . Krystle Feliz M.D.

## 2023-11-22 NOTE — TELEPHONE ENCOUNTER
LVM- he had a px on 1/9/23; does he have new ins that will cover a 2nd px this year or illness symptoms? otherwise he should reschedule after 1/9/24 yojana/ Ana COLLIER;

## 2023-11-24 RX ORDER — ATORVASTATIN CALCIUM 80 MG/1
80 TABLET, FILM COATED ORAL DAILY
Qty: 90 TABLET | Refills: 0 | Status: SHIPPED | OUTPATIENT
Start: 2023-11-24 | End: 2024-01-12

## 2024-01-10 ENCOUNTER — OFFICE VISIT (OUTPATIENT)
Dept: FAMILY MEDICINE | Facility: CLINIC | Age: 64
End: 2024-01-10
Payer: COMMERCIAL

## 2024-01-10 VITALS
BODY MASS INDEX: 33.81 KG/M2 | SYSTOLIC BLOOD PRESSURE: 136 MMHG | OXYGEN SATURATION: 97 % | TEMPERATURE: 97.8 F | HEART RATE: 73 BPM | RESPIRATION RATE: 16 BRPM | HEIGHT: 73 IN | WEIGHT: 255.1 LBS | DIASTOLIC BLOOD PRESSURE: 94 MMHG

## 2024-01-10 DIAGNOSIS — Z00.00 ROUTINE GENERAL MEDICAL EXAMINATION AT A HEALTH CARE FACILITY: Primary | ICD-10-CM

## 2024-01-10 DIAGNOSIS — E78.5 HYPERLIPIDEMIA LDL GOAL <100: ICD-10-CM

## 2024-01-10 LAB
ANION GAP SERPL CALCULATED.3IONS-SCNC: 15 MMOL/L (ref 7–15)
BUN SERPL-MCNC: 15.8 MG/DL (ref 8–23)
CALCIUM SERPL-MCNC: 10 MG/DL (ref 8.8–10.2)
CHLORIDE SERPL-SCNC: 105 MMOL/L (ref 98–107)
CHOLEST SERPL-MCNC: 165 MG/DL
CREAT SERPL-MCNC: 0.9 MG/DL (ref 0.67–1.17)
DEPRECATED HCO3 PLAS-SCNC: 22 MMOL/L (ref 22–29)
EGFRCR SERPLBLD CKD-EPI 2021: >90 ML/MIN/1.73M2
FASTING STATUS PATIENT QL REPORTED: YES
GLUCOSE SERPL-MCNC: 98 MG/DL (ref 70–99)
HDLC SERPL-MCNC: 52 MG/DL
LDLC SERPL CALC-MCNC: 99 MG/DL
NONHDLC SERPL-MCNC: 113 MG/DL
POTASSIUM SERPL-SCNC: 4.2 MMOL/L (ref 3.4–5.3)
SODIUM SERPL-SCNC: 142 MMOL/L (ref 135–145)
TRIGL SERPL-MCNC: 71 MG/DL

## 2024-01-10 PROCEDURE — 99396 PREV VISIT EST AGE 40-64: CPT | Performed by: FAMILY MEDICINE

## 2024-01-10 PROCEDURE — 36415 COLL VENOUS BLD VENIPUNCTURE: CPT | Performed by: FAMILY MEDICINE

## 2024-01-10 PROCEDURE — 80061 LIPID PANEL: CPT | Performed by: FAMILY MEDICINE

## 2024-01-10 PROCEDURE — 80048 BASIC METABOLIC PNL TOTAL CA: CPT | Performed by: FAMILY MEDICINE

## 2024-01-10 ASSESSMENT — PAIN SCALES - GENERAL: PAINLEVEL: NO PAIN (0)

## 2024-01-10 NOTE — LETTER
January 15, 2024      Lance Kingsleyper  [address]  [place] [address]        Dear ,    We are writing to inform you of your test results.    Cholesterol is much better.  Keep up the good work with diet, exercise and medication.  Recheck in 1 year.      Resulted Orders   Basic metabolic panel  (Ca, Cl, CO2, Creat, Gluc, K, Na, BUN)   Result Value Ref Range    Sodium 142 135 - 145 mmol/L      Comment:      Reference intervals for this test were updated on 09/26/2023 to more accurately reflect our healthy population. There may be differences in the flagging of prior results with similar values performed with this method. Interpretation of those prior results can be made in the context of the updated reference intervals.     Potassium 4.2 3.4 - 5.3 mmol/L    Chloride 105 98 - 107 mmol/L    Carbon Dioxide (CO2) 22 22 - 29 mmol/L    Anion Gap 15 7 - 15 mmol/L    Urea Nitrogen 15.8 8.0 - 23.0 mg/dL    Creatinine 0.90 0.67 - 1.17 mg/dL    GFR Estimate >90 >60 mL/min/1.73m2    Calcium 10.0 8.8 - 10.2 mg/dL    Glucose 98 70 - 99 mg/dL   Lipid panel reflex to direct LDL Fasting   Result Value Ref Range    Cholesterol 165 <200 mg/dL    Triglycerides 71 <150 mg/dL    Direct Measure HDL 52 >=40 mg/dL    LDL Cholesterol Calculated 99 <=100 mg/dL    Non HDL Cholesterol 113 <130 mg/dL    Patient Fasting > 8hrs? Yes     Narrative    Cholesterol  Desirable:  <200 mg/dL    Triglycerides  Normal:  Less than 150 mg/dL  Borderline High:  150-199 mg/dL  High:  200-499 mg/dL  Very High:  Greater than or equal to 500 mg/dL    Direct Measure HDL  Female:  Greater than or equal to 50 mg/dL   Male:  Greater than or equal to 40 mg/dL    LDL Cholesterol  Desirable:  <100mg/dL  Above Desirable:  100-129 mg/dL   Borderline High:  130-159 mg/dL   High:  160-189 mg/dL   Very High:  >= 190 mg/dL    Non HDL Cholesterol  Desirable:  130 mg/dL  Above Desirable:  130-159 mg/dL  Borderline High:  160-189 mg/dL  High:  190-219  mg/dL  Very High:  Greater than or equal to 220 mg/dL       If you have any questions or concerns, please call the clinic at the number listed above.       Sincerely,      Brian Perez MD/alen

## 2024-01-12 DIAGNOSIS — E78.5 HYPERLIPIDEMIA WITH TARGET LDL LESS THAN 160: ICD-10-CM

## 2024-01-12 RX ORDER — ATORVASTATIN CALCIUM 80 MG/1
80 TABLET, FILM COATED ORAL DAILY
Qty: 90 TABLET | Refills: 3 | Status: SHIPPED | OUTPATIENT
Start: 2024-01-12

## 2024-02-19 ENCOUNTER — OFFICE VISIT (OUTPATIENT)
Dept: ORTHOPEDICS | Facility: CLINIC | Age: 64
End: 2024-02-19
Payer: COMMERCIAL

## 2024-02-19 VITALS — HEIGHT: 73 IN | BODY MASS INDEX: 33.13 KG/M2 | WEIGHT: 250 LBS

## 2024-02-19 DIAGNOSIS — M17.11 PRIMARY OSTEOARTHRITIS OF RIGHT KNEE: Primary | ICD-10-CM

## 2024-02-19 PROCEDURE — 20610 DRAIN/INJ JOINT/BURSA W/O US: CPT | Mod: RT | Performed by: ORTHOPAEDIC SURGERY

## 2024-02-19 RX ORDER — METHYLPREDNISOLONE ACETATE 80 MG/ML
80 INJECTION, SUSPENSION INTRA-ARTICULAR; INTRALESIONAL; INTRAMUSCULAR; SOFT TISSUE
Status: DISCONTINUED | OUTPATIENT
Start: 2024-02-19 | End: 2024-08-15

## 2024-02-19 RX ORDER — BUPIVACAINE HYDROCHLORIDE 2.5 MG/ML
4 INJECTION, SOLUTION INFILTRATION; PERINEURAL
Status: DISCONTINUED | OUTPATIENT
Start: 2024-02-19 | End: 2024-08-15

## 2024-02-19 RX ADMIN — BUPIVACAINE HYDROCHLORIDE 4 ML: 2.5 INJECTION, SOLUTION INFILTRATION; PERINEURAL at 16:08

## 2024-02-19 RX ADMIN — METHYLPREDNISOLONE ACETATE 80 MG: 80 INJECTION, SUSPENSION INTRA-ARTICULAR; INTRALESIONAL; INTRAMUSCULAR; SOFT TISSUE at 16:08

## 2024-02-19 ASSESSMENT — PAIN SCALES - GENERAL: PAINLEVEL: EXTREME PAIN (8)

## 2024-02-19 NOTE — PROGRESS NOTES
CHIEF COMPLAINT:   Chief Complaint   Patient presents with    Right Knee - Pain     Right knee pain. Last injection on 8/23/24.          HISTORY OF PRESENT ILLNESS    Lance Hogue is a 63 year old male seen for followup evaluation of ongoing right knee pain with no known injury. Last seen for injections 8/23/2023. Returns today stating the injection worked well fo 3+ months or so. He probably could have been here sooner. He'd like another injection today.       Pain has been present for ~4-5 years, or so, progressively getting worse. Pain is constant, day and night, gets worse as the day goes on. Aggravated with bumping the knee, catching the foot, prolonged weight bearing activities. Better with rest, elevation, pillow behind the knee. Treatment has been a brace at work, icy hot at night, repeated cortisone injections.    denies low back pain, denies numbness and tingling, denies hip pain.    Present symptoms: pain medially  and anteriorly, pain sharp, shooting, dull/achy , moderate pain, mild swelling.    Pain severity: 8/10  Frequency of symptoms: are constant  Exacerbating Factors: weight bearing, stairs down more than up, prolonged standing  Relieving Factors: rest, sitting  Night Pain: Yes  Pain while at rest: No   Numbness or tingling: No   Patient has tried:     NSAIDS: No      Physical Therapy: Yes      Activity modification: No      Bracing: Yes      Injections: Yes   8/2023 most recently.     Ice: No      Assistive device:  No     Other: icy hot      Other PMH:  has a past medical history of Hyperlipidemia.  Patient Active Problem List   Diagnosis    Chronic pain of right knee    Primary osteoarthritis of right knee       Surgical Hx:  has a past surgical history that includes ENT surgery; Abdomen surgery; tonsillectomy; Laparoscopic herniorrhaphy inguinal (Bilateral, 11/8/2021); and Herniorrhaphy umbilical (N/A, 11/8/2021).    Medications:   Current Outpatient Medications:     atorvastatin  "(LIPITOR) 80 MG tablet, Take 1 tablet (80 mg) by mouth daily, Disp: 90 tablet, Rfl: 3    Cholecalciferol (VITAMIN D3 PO), Take by mouth daily, Disp: , Rfl:     fish oil-omega-3 fatty acids 1000 MG capsule, 1 cap(s), Disp: , Rfl:     Multiple Vitamin (MULTI VITAMIN) TABS, 1 tab(s), Disp: , Rfl:     tadalafil (CIALIS) 5 MG tablet, TAKE ONE TABLET BY MOUTH ONE TIME DAILY, Disp: 30 tablet, Rfl: 1    Current Facility-Administered Medications:     methylPREDNISolone (DEPO-Medrol) injection 80 mg, 80 mg, INTRA-ARTICULAR, , Flash Boggs MD, 80 mg at 08/23/23 1556    methylPREDNISolone (DEPO-MEDROL) injection 80 mg, 80 mg, , , Flash Boggs MD, 80 mg at 04/12/23 1547    Allergies:   Allergies   Allergen Reactions    Chocolate Diarrhea and Cramps    Cocoa      Other reaction(s): Abdominal cramping       Social Hx: .   reports that he has never smoked. He has quit using smokeless tobacco.  His smokeless tobacco use included snuff. He reports current alcohol use. He reports that he does not use drugs.    Family Hx: family history includes Alcoholism in his brother; Alzheimer Disease in his mother; Cancer in his mother, paternal grandfather, and paternal grandmother; Other - See Comments in his father.    REVIEW OF SYSTEMS:   CONSTITUTIONAL:NEGATIVE for fever, chills, change in weight  INTEGUMENTARY/SKIN: NEGATIVE for worrisome rashes, moles or lesions  MUSCULOSKELETAL:See HPI above  NEURO: NEGATIVE for weakness, dizziness or paresthesias    PHYSICAL EXAM:  Ht 1.854 m (6' 1\")   Wt 113.4 kg (250 lb)   BMI 32.98 kg/m     GENERAL APPEARANCE: healthy, alert, no distress ; accompanied by his wife.  SKIN: no suspicious lesions or rashes  NEURO: Normal strength and tone, mentation intact and speech normal  PSYCH:  mentation appears normal and affect normal, not anxious  RESPIRATORY: No increased work of breathing.      BILATERAL LOWER EXTREMITIES:  Gait: slight favors the right.  Alignment: varus  No Quad " "atrophy, strength normal.  calf soft and nttp or squeeze.  Edema: 1+ bilateral.      LEFT KNEE EXAM:    Skin: intact, no ecchymosis or erythema  ROM: full extension to 125 flexion  Tight hamstrings on straight leg raise.  Effusion: none  Tender: NTTP med/lat joint line, anterior or posterior knee  McMurrays: negative    MCL: stable, and non-painful at both 0 and 30 degrees knee flexion  Varus stress: stable, and non-painful at both 0 and 30 degrees knee flexion  Lachmans: neg, firm endpoint  Posterior Drawer stable  Patellofemoral joint:                Apprehension: negative              Crepitations: minimal      RIGHT KNEE EXAM:    Skin: intact, no ecchymosis or erythema  Squat: 100 %, not limited by pain.   causes diffuse discomfort.  ROM: full extension to 115 flexion, anterior and medial discomfort. Medial crepitus with range of motion.  Effusion: trace to small  Tender: severe medial joint line, posterior knee  NTTP lateral joint line.  McMurrays: positive medially.    MCL: stable, and non-painful at both 0 and 30 degrees knee flexion  Varus stress: stable, and non-painful at both 0 and 30 degrees knee flexion  Lachmans: neg, firm endpoint  Posterior Drawer stable  Patellofemoral joint:                Apprehension: negative              Crepitations: mild   Grind: positive.        X-RAY: no new images today. 3 views right knee from 10/28/2020 - no obvious fractures or dislocations. Moderate medial compartment narrowing with subchondral sclerosis, mild patello-femoral degenerative changes with marginal osteophytes.      ASSESSMENT/PLAN: Lance Hogue is a 63 year old male with chronic right knee pain, primary osteoarthritis.     * reviewed imaging studies with patient, showing arthritic changes, or wearing of the cartilage in the knee. This can be caused by normal \"wear and tear\" over the years or following prior injury to the knee. Suspect severity of the underlying osteoarthritis has progressed since " "these images were obtained in 2020.  * treatment usually nonsurgical to start, then can progress to surgical with total knee arthroplasty once nonsurgical management effective.    Non-surgical treatment for knee arthritis includes:    * rest, sitting  * Activity modification - avoid impact activities or activities that aggravate symptoms.  * NSAIDS (non-steroidal anti-inflammatory medications; e.g. Aleve, advil, motrin, ibuprofen) - regular use for inflammation ( twice daily or three times daily), with food, as long as no contra-indications Please discuss with primary care doctor if needed  * ice, 15-20 minutes at a time several times a day or as needed.  * Strengthening of quadriceps muscles  * Physical Therapy for strengthening, stretching and range of motion exercises of legs  * Tylenol as needed for pain, consider Tylenol arthritis or similar  * Weight loss: Weight loss:  Body mass index is 32.98 kg/m .. weight loss benefits, not only for the current pain symptoms, but also overall health. Recommend a good diet plan that works for the patient, with the assistance of a dietician or primary care doctor as needed. Also, a good, low-impact exercise program for at least 20 minutes per day, 3 times per week, such as exercise bike, elliptical , or pool.  * Exercise: low impact such as stationary bike, elliptical, pool.  * Injections: cortisone versus viscosupplementation (hyaluronic acid, \"rooster comb\", \"gel shots\"); risks and perceived benefits discussed today. Patient elects to proceed.  * Bracing: bracing the knee may offer some relief of symptoms when worn and provide some stability.  * over the counter supplements such as glucosamine and chondroitin sulfate may help with joint pain.  * topical ointments may help as well    * return to clinic as needed. Consider updated images in the future as needed.      Flash Boggs M.D., M.S.  Dept. of Orthopaedic Surgery  [facility]    Large Joint " Injection/Arthocentesis: R knee joint    Date/Time: 2/19/2024 4:08 PM    Performed by: Andrea Alvarez PA  Authorized by: Flash Boggs MD    Indications:  Pain and osteoarthritis  Needle Size:  22 G  Guidance: landmark guided    Approach:  Anteromedial  Location:  Knee      Medications:  80 mg methylPREDNISolone 80 MG/ML; 4 mL BUPivacaine 0.25 %  Outcome:  Tolerated well, no immediate complications  Procedure discussed: discussed risks, benefits, and alternatives    Consent Given by:  Patient  Prep: patient was prepped and draped in usual sterile fashion

## 2024-02-19 NOTE — LETTER
2/19/2024         RE: Lance Hogue  [id number]  [facility] [address]        Dear Colleague,    Thank you for referring your patient, Lance Hogue, to the [facility] [place]. Please see a copy of my visit note below.      CHIEF COMPLAINT:   Chief Complaint   Patient presents with     Right Knee - Pain     Right knee pain. Last injection on 8/23/24.          HISTORY OF PRESENT ILLNESS    Lance Hogue is a 63 year old male seen for followup evaluation of ongoing right knee pain with no known injury. Last seen for injections 8/23/2023. Returns today stating the injection worked well fo 3+ months or so. He probably could have been here sooner. He'd like another injection today.       Pain has been present for ~4-5 years, or so, progressively getting worse. Pain is constant, day and night, gets worse as the day goes on. Aggravated with bumping the knee, catching the foot, prolonged weight bearing activities. Better with rest, elevation, pillow behind the knee. Treatment has been a brace at work, icy hot at night, repeated cortisone injections.    denies low back pain, denies numbness and tingling, denies hip pain.    Present symptoms: pain medially  and anteriorly, pain sharp, shooting, dull/achy , moderate pain, mild swelling.    Pain severity: 8/10  Frequency of symptoms: are constant  Exacerbating Factors: weight bearing, stairs down more than up, prolonged standing  Relieving Factors: rest, sitting  Night Pain: Yes  Pain while at rest: No   Numbness or tingling: No   Patient has tried:     NSAIDS: No      Physical Therapy: Yes      Activity modification: No      Bracing: Yes      Injections: Yes   8/2023 most recently.     Ice: No      Assistive device:  No     Other: icy hot      Other PMH:  has a past medical history of Hyperlipidemia.  Patient Active Problem List   Diagnosis     Chronic pain of right knee     Primary osteoarthritis of right knee       Surgical Hx:   "has a past surgical history that includes ENT surgery; Abdomen surgery; tonsillectomy; Laparoscopic herniorrhaphy inguinal (Bilateral, 11/8/2021); and Herniorrhaphy umbilical (N/A, 11/8/2021).    Medications:   Current Outpatient Medications:      atorvastatin (LIPITOR) 80 MG tablet, Take 1 tablet (80 mg) by mouth daily, Disp: 90 tablet, Rfl: 3     Cholecalciferol (VITAMIN D3 PO), Take by mouth daily, Disp: , Rfl:      fish oil-omega-3 fatty acids 1000 MG capsule, 1 cap(s), Disp: , Rfl:      Multiple Vitamin (MULTI VITAMIN) TABS, 1 tab(s), Disp: , Rfl:      tadalafil (CIALIS) 5 MG tablet, TAKE ONE TABLET BY MOUTH ONE TIME DAILY, Disp: 30 tablet, Rfl: 1    Current Facility-Administered Medications:      methylPREDNISolone (DEPO-Medrol) injection 80 mg, 80 mg, INTRA-ARTICULAR, , Flash Boggs MD, 80 mg at 08/23/23 1556     methylPREDNISolone (DEPO-MEDROL) injection 80 mg, 80 mg, , , Flash Boggs MD, 80 mg at 04/12/23 1547    Allergies:   Allergies   Allergen Reactions     Chocolate Diarrhea and Cramps     Cocoa      Other reaction(s): Abdominal cramping       Social Hx: .   reports that he has never smoked. He has quit using smokeless tobacco.  His smokeless tobacco use included snuff. He reports current alcohol use. He reports that he does not use drugs.    Family Hx: family history includes Alcoholism in his brother; Alzheimer Disease in his mother; Cancer in his mother, paternal grandfather, and paternal grandmother; Other - See Comments in his father.    REVIEW OF SYSTEMS:   CONSTITUTIONAL:NEGATIVE for fever, chills, change in weight  INTEGUMENTARY/SKIN: NEGATIVE for worrisome rashes, moles or lesions  MUSCULOSKELETAL:See HPI above  NEURO: NEGATIVE for weakness, dizziness or paresthesias    PHYSICAL EXAM:  Ht 1.854 m (6' 1\")   Wt 113.4 kg (250 lb)   BMI 32.98 kg/m     GENERAL APPEARANCE: healthy, alert, no distress ; accompanied by his wife.  SKIN: no suspicious lesions or " rashes  NEURO: Normal strength and tone, mentation intact and speech normal  PSYCH:  mentation appears normal and affect normal, not anxious  RESPIRATORY: No increased work of breathing.      BILATERAL LOWER EXTREMITIES:  Gait: slight favors the right.  Alignment: varus  No Quad atrophy, strength normal.  calf soft and nttp or squeeze.  Edema: 1+ bilateral.      LEFT KNEE EXAM:    Skin: intact, no ecchymosis or erythema  ROM: full extension to 125 flexion  Tight hamstrings on straight leg raise.  Effusion: none  Tender: NTTP med/lat joint line, anterior or posterior knee  McMurrays: negative    MCL: stable, and non-painful at both 0 and 30 degrees knee flexion  Varus stress: stable, and non-painful at both 0 and 30 degrees knee flexion  Lachmans: neg, firm endpoint  Posterior Drawer stable  Patellofemoral joint:                Apprehension: negative              Crepitations: minimal      RIGHT KNEE EXAM:    Skin: intact, no ecchymosis or erythema  Squat: 100 %, not limited by pain.   causes diffuse discomfort.  ROM: full extension to 115 flexion, anterior and medial discomfort. Medial crepitus with range of motion.  Effusion: trace to small  Tender: severe medial joint line, posterior knee  NTTP lateral joint line.  McMurrays: positive medially.    MCL: stable, and non-painful at both 0 and 30 degrees knee flexion  Varus stress: stable, and non-painful at both 0 and 30 degrees knee flexion  Lachmans: neg, firm endpoint  Posterior Drawer stable  Patellofemoral joint:                Apprehension: negative              Crepitations: mild   Grind: positive.        X-RAY: no new images today. 3 views right knee from 10/28/2020 - no obvious fractures or dislocations. Moderate medial compartment narrowing with subchondral sclerosis, mild patello-femoral degenerative changes with marginal osteophytes.      ASSESSMENT/PLAN: Lance Hogue is a 63 year old male with chronic right knee pain, primary osteoarthritis.     *  "reviewed imaging studies with patient, showing arthritic changes, or wearing of the cartilage in the knee. This can be caused by normal \"wear and tear\" over the years or following prior injury to the knee. Suspect severity of the underlying osteoarthritis has progressed since these images were obtained in 2020.  * treatment usually nonsurgical to start, then can progress to surgical with total knee arthroplasty once nonsurgical management effective.    Non-surgical treatment for knee arthritis includes:    * rest, sitting  * Activity modification - avoid impact activities or activities that aggravate symptoms.  * NSAIDS (non-steroidal anti-inflammatory medications; e.g. Aleve, advil, motrin, ibuprofen) - regular use for inflammation ( twice daily or three times daily), with food, as long as no contra-indications Please discuss with primary care doctor if needed  * ice, 15-20 minutes at a time several times a day or as needed.  * Strengthening of quadriceps muscles  * Physical Therapy for strengthening, stretching and range of motion exercises of legs  * Tylenol as needed for pain, consider Tylenol arthritis or similar  * Weight loss: Weight loss:  Body mass index is 32.98 kg/m .. weight loss benefits, not only for the current pain symptoms, but also overall health. Recommend a good diet plan that works for the patient, with the assistance of a dietician or primary care doctor as needed. Also, a good, low-impact exercise program for at least 20 minutes per day, 3 times per week, such as exercise bike, elliptical , or pool.  * Exercise: low impact such as stationary bike, elliptical, pool.  * Injections: cortisone versus viscosupplementation (hyaluronic acid, \"rooster comb\", \"gel shots\"); risks and perceived benefits discussed today. Patient elects to proceed.  * Bracing: bracing the knee may offer some relief of symptoms when worn and provide some stability.  * over the counter supplements such as glucosamine " and chondroitin sulfate may help with joint pain.  * topical ointments may help as well    * return to clinic as needed. Consider updated images in the future as needed.      Flash Boggs M.D., M.S.  Dept. of Orthopaedic Surgery  [facility]    Large Joint Injection/Arthocentesis: R knee joint    Date/Time: 2/19/2024 4:08 PM    Performed by: Andrea Alvarez PA  Authorized by: Flash Boggs MD    Indications:  Pain and osteoarthritis  Needle Size:  22 G  Guidance: landmark guided    Approach:  Anteromedial  Location:  Knee      Medications:  80 mg methylPREDNISolone 80 MG/ML; 4 mL BUPivacaine 0.25 %  Outcome:  Tolerated well, no immediate complications  Procedure discussed: discussed risks, benefits, and alternatives    Consent Given by:  Patient  Prep: patient was prepped and draped in usual sterile fashion          Again, thank you for allowing me to participate in the care of your patient.        Sincerely,        Flash Boggs MD

## 2024-04-22 DIAGNOSIS — N52.03 COMBINED ARTERIAL INSUFFICIENCY AND CORPORO-VENOUS OCCLUSIVE ERECTILE DYSFUNCTION: ICD-10-CM

## 2024-04-23 RX ORDER — TADALAFIL 5 MG/1
TABLET ORAL
Qty: 30 TABLET | Refills: 5 | Status: SHIPPED | OUTPATIENT
Start: 2024-04-23

## 2024-08-13 NOTE — PROGRESS NOTES
CHIEF COMPLAINT:   Chief Complaint   Patient presents with    Right Knee - Pain     Last injection: 2/19/24. Patient notes the injection worked well lasting about 4 months. He would like to have another injection today. His knee has become very uncomfortable.          HISTORY OF PRESENT ILLNESS    Lance Hogue is a 63 year old male seen for followup evaluation of ongoing right knee pain with no known injury. Last seen for injections 2/19/2024. Returns today stating the injection worked well for 4+ months or so. He probably could have been here sooner but wife with some medical problems. He'd like another injection today.     Pain has been present for ~4-5 years, or so, progressively getting worse. Pain is constant, day and night, gets worse as the day goes on. Aggravated with bumping the knee, catching the foot, prolonged weight bearing activities. Better with rest, elevation, pillow behind the knee. Treatment has been a brace at work, icy hot at night, repeated cortisone injections.    denies low back pain, denies numbness and tingling, denies hip pain.    Present symptoms: pain medially  and anteriorly, pain sharp, shooting, dull/achy , moderate pain, mild swelling.    Pain severity: 9/10  Frequency of symptoms: are constant  Exacerbating Factors: weight bearing, stairs down more than up, prolonged standing  Relieving Factors: rest, sitting  Night Pain: Yes  Pain while at rest: No   Numbness or tingling: No   Patient has tried:     NSAIDS: No      Physical Therapy: Yes      Activity modification: No      Bracing: Yes      Injections: Yes   2/2024 and 8/2023 most recently.     Ice: No      Assistive device:  No     Other: icy hot      Other PMH:  has a past medical history of Hyperlipidemia.  Patient Active Problem List   Diagnosis    Chronic pain of right knee    Primary osteoarthritis of right knee       Surgical Hx:  has a past surgical history that includes ENT surgery; Abdomen surgery; tonsillectomy;  "Laparoscopic herniorrhaphy inguinal (Bilateral, 11/8/2021); and Herniorrhaphy umbilical (N/A, 11/8/2021).    Medications:   Current Outpatient Medications:     atorvastatin (LIPITOR) 80 MG tablet, Take 1 tablet (80 mg) by mouth daily, Disp: 90 tablet, Rfl: 3    Cholecalciferol (VITAMIN D3 PO), Take by mouth daily, Disp: , Rfl:     fish oil-omega-3 fatty acids 1000 MG capsule, 1 cap(s), Disp: , Rfl:     Multiple Vitamin (MULTI VITAMIN) TABS, 1 tab(s), Disp: , Rfl:     tadalafil (CIALIS) 5 MG tablet, TAKE ONE TABLET BY MOUTH ONE TIME DAILY, Disp: 30 tablet, Rfl: 5    Current Facility-Administered Medications:     BUPivacaine (MARCAINE) 0.25 % injection 4 mL, 4 mL, , , Flash Boggs MD, 4 mL at 02/19/24 1608    methylPREDNISolone (DEPO-Medrol) injection 80 mg, 80 mg, , , Flash Boggs MD, 80 mg at 02/19/24 1608    Allergies:   Allergies   Allergen Reactions    Chocolate Diarrhea and Cramps    Cocoa      Other reaction(s): Abdominal cramping       Social Hx: .   reports that he has never smoked. He has quit using smokeless tobacco.  His smokeless tobacco use included snuff. He reports current alcohol use. He reports that he does not use drugs.    Family Hx: family history includes Alcoholism in his brother; Alzheimer Disease in his mother; Cancer in his mother, paternal grandfather, and paternal grandmother; Other - See Comments in his father.    REVIEW OF SYSTEMS:   CONSTITUTIONAL:NEGATIVE for fever, chills, change in weight  INTEGUMENTARY/SKIN: NEGATIVE for worrisome rashes, moles or lesions  MUSCULOSKELETAL:See HPI above  NEURO: NEGATIVE for weakness, dizziness or paresthesias    PHYSICAL EXAM:  BP (!) 156/106   Pulse 67   Ht 1.854 m (6' 1\")   Wt 110.9 kg (244 lb 6.4 oz)   BMI 32.24 kg/m     GENERAL APPEARANCE: healthy, alert, no distress   SKIN: no suspicious lesions or rashes  NEURO: Normal strength and tone, mentation intact and speech normal  PSYCH:  mentation appears normal and " affect normal, not anxious  RESPIRATORY: No increased work of breathing.      BILATERAL LOWER EXTREMITIES:  Gait: slight favors the right.  Alignment: varus  No Quad atrophy, strength normal.  calf soft and nttp or squeeze.  Edema: 1+ bilateral.      LEFT KNEE EXAM:    Skin: intact, no ecchymosis or erythema  ROM: full extension to 125 flexion  Tight hamstrings on straight leg raise.  Effusion: none  Tender: NTTP med/lat joint line, anterior or posterior knee  McMurrays: negative    MCL: stable, and non-painful at both 0 and 30 degrees knee flexion  Varus stress: stable, and non-painful at both 0 and 30 degrees knee flexion  Lachmans: neg, firm endpoint  Posterior Drawer stable  Patellofemoral joint:                Apprehension: negative              Crepitations: minimal      RIGHT KNEE EXAM:    Skin: intact, no ecchymosis or erythema  Squat: 100 %, not limited by pain.   causes diffuse discomfort.  ROM: full extension to 115 flexion, anterior and medial discomfort. Medial crepitus with range of motion.  Effusion: trace to small  Tender: severe medial joint line, posterior knee  NTTP lateral joint line.  McMurrays: positive medially.    MCL: stable, and non-painful at both 0 and 30 degrees knee flexion  Varus stress: stable, and non-painful at both 0 and 30 degrees knee flexion  Lachmans: neg, firm endpoint  Posterior Drawer stable  Patellofemoral joint:                Apprehension: negative              Crepitations: mild   Grind: positive.        X-RAY: no new images today. 3 views right knee from 10/28/2020 - no obvious fractures or dislocations. Moderate medial compartment narrowing with subchondral sclerosis, mild patello-femoral degenerative changes with marginal osteophytes.      ASSESSMENT/PLAN: Lance Hogue is a 63 year old male with chronic right knee pain, primary osteoarthritis.     * reviewed imaging studies with patient, showing arthritic changes, or wearing of the cartilage in the knee. This can  "be caused by normal \"wear and tear\" over the years or following prior injury to the knee. Suspect severity of the underlying osteoarthritis has progressed since these images were obtained in 2020.  * treatment usually nonsurgical to start, then can progress to surgical with total knee arthroplasty once nonsurgical management effective.    Non-surgical treatment for knee arthritis includes:    * rest, sitting  * Activity modification - avoid impact activities or activities that aggravate symptoms.  * NSAIDS (non-steroidal anti-inflammatory medications; e.g. Aleve, advil, motrin, ibuprofen) - regular use for inflammation ( twice daily or three times daily), with food, as long as no contra-indications Please discuss with primary care doctor if needed  * ice, 15-20 minutes at a time several times a day or as needed.  * Strengthening of quadriceps muscles  * Physical Therapy for strengthening, stretching and range of motion exercises of legs  * Tylenol as needed for pain, consider Tylenol arthritis or similar  * Weight loss: Weight loss:  Body mass index is 32.24 kg/m .. weight loss benefits, not only for the current pain symptoms, but also overall health. Recommend a good diet plan that works for the patient, with the assistance of a dietician or primary care doctor as needed. Also, a good, low-impact exercise program for at least 20 minutes per day, 3 times per week, such as exercise bike, elliptical , or pool.  * Exercise: low impact such as stationary bike, elliptical, pool.  * Injections: cortisone versus viscosupplementation (hyaluronic acid, \"rooster comb\", \"gel shots\"); risks and perceived benefits discussed today. Patient elects to proceed.  * Bracing: bracing the knee may offer some relief of symptoms when worn and provide some stability.  * over the counter supplements such as glucosamine and chondroitin sulfate may help with joint pain.  * topical ointments may help as well    * return to clinic as " needed. Consider updated images in the future as needed.      Flash Boggs M.D., M.S.  Dept. of Orthopaedic Surgery  [facility]    Large Joint Injection/Arthocentesis: R knee joint    Date/Time: 8/15/2024 4:03 PM    Performed by: Flash Boggs MD  Authorized by: Flash Boggs MD    Indications:  Pain  Needle Size:  22 G  Guidance: landmark guided    Approach:  Anteromedial  Location:  Knee      Medications:  80 mg methylPREDNISolone 80 MG/ML; 4 mL BUPivacaine 0.25 %  Outcome:  Tolerated well, no immediate complications  Procedure discussed: discussed risks, benefits, and alternatives    Consent Given by:  Patient  Timeout: timeout called immediately prior to procedure    Prep: patient was prepped and draped in usual sterile fashion

## 2024-08-15 ENCOUNTER — OFFICE VISIT (OUTPATIENT)
Dept: ORTHOPEDICS | Facility: CLINIC | Age: 64
End: 2024-08-15
Payer: COMMERCIAL

## 2024-08-15 VITALS
SYSTOLIC BLOOD PRESSURE: 156 MMHG | BODY MASS INDEX: 32.39 KG/M2 | HEART RATE: 67 BPM | DIASTOLIC BLOOD PRESSURE: 106 MMHG | WEIGHT: 244.4 LBS | HEIGHT: 73 IN

## 2024-08-15 DIAGNOSIS — M25.561 CHRONIC PAIN OF RIGHT KNEE: ICD-10-CM

## 2024-08-15 DIAGNOSIS — M17.11 PRIMARY OSTEOARTHRITIS OF RIGHT KNEE: Primary | ICD-10-CM

## 2024-08-15 DIAGNOSIS — G89.29 CHRONIC PAIN OF RIGHT KNEE: ICD-10-CM

## 2024-08-15 PROCEDURE — 20610 DRAIN/INJ JOINT/BURSA W/O US: CPT | Mod: RT | Performed by: ORTHOPAEDIC SURGERY

## 2024-08-15 RX ORDER — BUPIVACAINE HYDROCHLORIDE 2.5 MG/ML
4 INJECTION, SOLUTION INFILTRATION; PERINEURAL
Status: SHIPPED | OUTPATIENT
Start: 2024-08-15

## 2024-08-15 RX ORDER — METHYLPREDNISOLONE ACETATE 80 MG/ML
80 INJECTION, SUSPENSION INTRA-ARTICULAR; INTRALESIONAL; INTRAMUSCULAR; SOFT TISSUE
Status: SHIPPED | OUTPATIENT
Start: 2024-08-15

## 2024-08-15 RX ADMIN — BUPIVACAINE HYDROCHLORIDE 4 ML: 2.5 INJECTION, SOLUTION INFILTRATION; PERINEURAL at 16:03

## 2024-08-15 RX ADMIN — METHYLPREDNISOLONE ACETATE 80 MG: 80 INJECTION, SUSPENSION INTRA-ARTICULAR; INTRALESIONAL; INTRAMUSCULAR; SOFT TISSUE at 16:03

## 2024-08-15 ASSESSMENT — PAIN SCALES - GENERAL: PAINLEVEL: EXTREME PAIN (9)

## 2024-08-15 NOTE — LETTER
8/15/2024      Lance Hogue  [id number]  [facility] [address]      Dear Colleague,    Thank you for referring your patient, Lance Hogue, to the [facility]. Please see a copy of my visit note below.      CHIEF COMPLAINT:   Chief Complaint   Patient presents with     Right Knee - Pain     Last injection: 2/19/24. Patient notes the injection worked well lasting about 4 months. He would like to have another injection today. His knee has become very uncomfortable.          HISTORY OF PRESENT ILLNESS    Lance Hogue is a 63 year old male seen for followup evaluation of ongoing right knee pain with no known injury. Last seen for injections 2/19/2024. Returns today stating the injection worked well for 4+ months or so. He probably could have been here sooner but wife with some medical problems. He'd like another injection today.     Pain has been present for ~4-5 years, or so, progressively getting worse. Pain is constant, day and night, gets worse as the day goes on. Aggravated with bumping the knee, catching the foot, prolonged weight bearing activities. Better with rest, elevation, pillow behind the knee. Treatment has been a brace at work, icy hot at night, repeated cortisone injections.    denies low back pain, denies numbness and tingling, denies hip pain.    Present symptoms: pain medially  and anteriorly, pain sharp, shooting, dull/achy , moderate pain, mild swelling.    Pain severity: 9/10  Frequency of symptoms: are constant  Exacerbating Factors: weight bearing, stairs down more than up, prolonged standing  Relieving Factors: rest, sitting  Night Pain: Yes  Pain while at rest: No   Numbness or tingling: No   Patient has tried:     NSAIDS: No      Physical Therapy: Yes      Activity modification: No      Bracing: Yes      Injections: Yes   2/2024 and 8/2023 most recently.     Ice: No      Assistive device:  No     Other: icy hot      Other PMH:  has a past  medical history of Hyperlipidemia.  Patient Active Problem List   Diagnosis     Chronic pain of right knee     Primary osteoarthritis of right knee       Surgical Hx:  has a past surgical history that includes ENT surgery; Abdomen surgery; tonsillectomy; Laparoscopic herniorrhaphy inguinal (Bilateral, 11/8/2021); and Herniorrhaphy umbilical (N/A, 11/8/2021).    Medications:   Current Outpatient Medications:      atorvastatin (LIPITOR) 80 MG tablet, Take 1 tablet (80 mg) by mouth daily, Disp: 90 tablet, Rfl: 3     Cholecalciferol (VITAMIN D3 PO), Take by mouth daily, Disp: , Rfl:      fish oil-omega-3 fatty acids 1000 MG capsule, 1 cap(s), Disp: , Rfl:      Multiple Vitamin (MULTI VITAMIN) TABS, 1 tab(s), Disp: , Rfl:      tadalafil (CIALIS) 5 MG tablet, TAKE ONE TABLET BY MOUTH ONE TIME DAILY, Disp: 30 tablet, Rfl: 5    Current Facility-Administered Medications:      BUPivacaine (MARCAINE) 0.25 % injection 4 mL, 4 mL, , , Flash Boggs MD, 4 mL at 02/19/24 1608     methylPREDNISolone (DEPO-Medrol) injection 80 mg, 80 mg, , , Flash Boggs MD, 80 mg at 02/19/24 1608    Allergies:   Allergies   Allergen Reactions     Chocolate Diarrhea and Cramps     Cocoa      Other reaction(s): Abdominal cramping       Social Hx: .   reports that he has never smoked. He has quit using smokeless tobacco.  His smokeless tobacco use included snuff. He reports current alcohol use. He reports that he does not use drugs.    Family Hx: family history includes Alcoholism in his brother; Alzheimer Disease in his mother; Cancer in his mother, paternal grandfather, and paternal grandmother; Other - See Comments in his father.    REVIEW OF SYSTEMS:   CONSTITUTIONAL:NEGATIVE for fever, chills, change in weight  INTEGUMENTARY/SKIN: NEGATIVE for worrisome rashes, moles or lesions  MUSCULOSKELETAL:See HPI above  NEURO: NEGATIVE for weakness, dizziness or paresthesias    PHYSICAL EXAM:  BP (!) 156/106   Pulse 67   Ht  "1.854 m (6' 1\")   Wt 110.9 kg (244 lb 6.4 oz)   BMI 32.24 kg/m     GENERAL APPEARANCE: healthy, alert, no distress   SKIN: no suspicious lesions or rashes  NEURO: Normal strength and tone, mentation intact and speech normal  PSYCH:  mentation appears normal and affect normal, not anxious  RESPIRATORY: No increased work of breathing.      BILATERAL LOWER EXTREMITIES:  Gait: slight favors the right.  Alignment: varus  No Quad atrophy, strength normal.  calf soft and nttp or squeeze.  Edema: 1+ bilateral.      LEFT KNEE EXAM:    Skin: intact, no ecchymosis or erythema  ROM: full extension to 125 flexion  Tight hamstrings on straight leg raise.  Effusion: none  Tender: NTTP med/lat joint line, anterior or posterior knee  McMurrays: negative    MCL: stable, and non-painful at both 0 and 30 degrees knee flexion  Varus stress: stable, and non-painful at both 0 and 30 degrees knee flexion  Lachmans: neg, firm endpoint  Posterior Drawer stable  Patellofemoral joint:                Apprehension: negative              Crepitations: minimal      RIGHT KNEE EXAM:    Skin: intact, no ecchymosis or erythema  Squat: 100 %, not limited by pain.   causes diffuse discomfort.  ROM: full extension to 115 flexion, anterior and medial discomfort. Medial crepitus with range of motion.  Effusion: trace to small  Tender: severe medial joint line, posterior knee  NTTP lateral joint line.  McMurrays: positive medially.    MCL: stable, and non-painful at both 0 and 30 degrees knee flexion  Varus stress: stable, and non-painful at both 0 and 30 degrees knee flexion  Lachmans: neg, firm endpoint  Posterior Drawer stable  Patellofemoral joint:                Apprehension: negative              Crepitations: mild   Grind: positive.        X-RAY: no new images today. 3 views right knee from 10/28/2020 - no obvious fractures or dislocations. Moderate medial compartment narrowing with subchondral sclerosis, mild patello-femoral degenerative changes " "with marginal osteophytes.      ASSESSMENT/PLAN: Lance Hogue is a 63 year old male with chronic right knee pain, primary osteoarthritis.     * reviewed imaging studies with patient, showing arthritic changes, or wearing of the cartilage in the knee. This can be caused by normal \"wear and tear\" over the years or following prior injury to the knee. Suspect severity of the underlying osteoarthritis has progressed since these images were obtained in 2020.  * treatment usually nonsurgical to start, then can progress to surgical with total knee arthroplasty once nonsurgical management effective.    Non-surgical treatment for knee arthritis includes:    * rest, sitting  * Activity modification - avoid impact activities or activities that aggravate symptoms.  * NSAIDS (non-steroidal anti-inflammatory medications; e.g. Aleve, advil, motrin, ibuprofen) - regular use for inflammation ( twice daily or three times daily), with food, as long as no contra-indications Please discuss with primary care doctor if needed  * ice, 15-20 minutes at a time several times a day or as needed.  * Strengthening of quadriceps muscles  * Physical Therapy for strengthening, stretching and range of motion exercises of legs  * Tylenol as needed for pain, consider Tylenol arthritis or similar  * Weight loss: Weight loss:  Body mass index is 32.24 kg/m .. weight loss benefits, not only for the current pain symptoms, but also overall health. Recommend a good diet plan that works for the patient, with the assistance of a dietician or primary care doctor as needed. Also, a good, low-impact exercise program for at least 20 minutes per day, 3 times per week, such as exercise bike, elliptical , or pool.  * Exercise: low impact such as stationary bike, elliptical, pool.  * Injections: cortisone versus viscosupplementation (hyaluronic acid, \"rooster comb\", \"gel shots\"); risks and perceived benefits discussed today. Patient elects to proceed.  * " Bracing: bracing the knee may offer some relief of symptoms when worn and provide some stability.  * over the counter supplements such as glucosamine and chondroitin sulfate may help with joint pain.  * topical ointments may help as well    * return to clinic as needed. Consider updated images in the future as needed.      Flash Boggs M.D., M.S.  Dept. of Orthopaedic Surgery  [facility]    Large Joint Injection/Arthocentesis: R knee joint    Date/Time: 8/15/2024 4:03 PM    Performed by: Flash Boggs MD  Authorized by: Flash Boggs MD    Indications:  Pain  Needle Size:  22 G  Guidance: landmark guided    Approach:  Anteromedial  Location:  Knee      Medications:  80 mg methylPREDNISolone 80 MG/ML; 4 mL BUPivacaine 0.25 %  Outcome:  Tolerated well, no immediate complications  Procedure discussed: discussed risks, benefits, and alternatives    Consent Given by:  Patient  Timeout: timeout called immediately prior to procedure    Prep: patient was prepped and draped in usual sterile fashion          Again, thank you for allowing me to participate in the care of your patient.        Sincerely,        Flash Boggs MD

## 2024-12-03 NOTE — PROGRESS NOTES
CHIEF COMPLAINT:   Chief Complaint   Patient presents with    Right Knee - Pain     Last injection 8/15/24. Injection worked pretty good. Lasting up till about a week ago. He has been doing a lot of things at work that are a little more strenuous. He would like another injection today.          HISTORY OF PRESENT ILLNESS    Lance Hogue is a 64 year old male seen for followup evaluation of ongoing right knee pain with no known injury. Last seen for injections 8/15/2024,  2/19/2024. Returns today stating the last injection worked well up until the past week or so. He's been doing a lot of things at work and has been more strenuous on his knee. He'd like another injection today.    He's retiring in 33 months.       Pain has been present for ~4-5 years, or so, progressively getting worse. Pain is constant, day and night, gets worse as the day goes on. Aggravated with bumping the knee, catching the foot, prolonged weight bearing activities. Better with rest, elevation, pillow behind the knee. Treatment has been a brace at work, icy hot at night, repeated cortisone injections.    denies low back pain, denies numbness and tingling, denies hip pain.    Present symptoms: pain medially  and anteriorly, pain sharp, shooting, dull/achy , moderate pain, mild swelling.    Pain severity: 8/10  Frequency of symptoms: are constant  Exacerbating Factors: weight bearing, stairs down more than up, prolonged standing  Relieving Factors: rest, sitting  Night Pain: Yes  Pain while at rest: No   Numbness or tingling: No   Patient has tried:     NSAIDS: No      Physical Therapy: Yes      Activity modification: No      Bracing: Yes      Injections: Yes   2/2024 and 8/2023 most recently.     Ice: No      Assistive device:  No     Other: icy hot      Other PMH:  has a past medical history of Hyperlipidemia.  Patient Active Problem List   Diagnosis    Chronic pain of right knee    Primary osteoarthritis of right knee       Surgical Hx:   has a past surgical history that includes ENT surgery; Abdomen surgery; tonsillectomy; Laparoscopic herniorrhaphy inguinal (Bilateral, 11/8/2021); and Herniorrhaphy umbilical (N/A, 11/8/2021).    Medications:   Current Outpatient Medications:     atorvastatin (LIPITOR) 80 MG tablet, Take 1 tablet (80 mg) by mouth daily, Disp: 90 tablet, Rfl: 3    Cholecalciferol (VITAMIN D3 PO), Take by mouth daily, Disp: , Rfl:     fish oil-omega-3 fatty acids 1000 MG capsule, 1 cap(s), Disp: , Rfl:     Multiple Vitamin (MULTI VITAMIN) TABS, 1 tab(s), Disp: , Rfl:     tadalafil (CIALIS) 5 MG tablet, TAKE ONE TABLET BY MOUTH ONE TIME DAILY, Disp: 30 tablet, Rfl: 5    Current Facility-Administered Medications:     BUPivacaine (MARCAINE) 0.25 % injection 4 mL, 4 mL, , , , 4 mL at 08/15/24 1603    methylPREDNISolone (DEPO-Medrol) injection 80 mg, 80 mg, , , , 80 mg at 08/15/24 1603    Allergies:   Allergies   Allergen Reactions    Chocolate Diarrhea and Cramps    Cocoa      Other reaction(s): Abdominal cramping       Social Hx: .   reports that he has never smoked. He has quit using smokeless tobacco.  His smokeless tobacco use included snuff. He reports current alcohol use. He reports that he does not use drugs.    Family Hx: family history includes Alcoholism in his brother; Alzheimer Disease in his mother; Cancer in his mother, paternal grandfather, and paternal grandmother; Other - See Comments in his father.    REVIEW OF SYSTEMS:   CONSTITUTIONAL:NEGATIVE for fever, chills, change in weight  INTEGUMENTARY/SKIN: NEGATIVE for worrisome rashes, moles or lesions  MUSCULOSKELETAL:See HPI above  NEURO: NEGATIVE for weakness, dizziness or paresthesias    PHYSICAL EXAM:  There were no vitals taken for this visit.   GENERAL APPEARANCE: healthy, alert, no distress   SKIN: no suspicious lesions or rashes  NEURO: Normal strength and tone, mentation intact and speech normal  PSYCH:  mentation appears normal and affect normal,  not anxious  RESPIRATORY: No increased work of breathing.      BILATERAL LOWER EXTREMITIES:  Gait: slight favors the right.  Alignment: varus  No Quad atrophy, strength normal.  calf soft and nttp or squeeze.  Edema: 1+ bilateral.      LEFT KNEE EXAM:    Skin: intact, no ecchymosis or erythema  ROM: full extension to 125 flexion  Tight hamstrings on straight leg raise.  Effusion: none  Tender: NTTP med/lat joint line, anterior or posterior knee  McMurrays: negative    MCL: stable, and non-painful at both 0 and 30 degrees knee flexion  Varus stress: stable, and non-painful at both 0 and 30 degrees knee flexion  Lachmans: neg, firm endpoint  Posterior Drawer stable  Patellofemoral joint:                Apprehension: negative              Crepitations: minimal      RIGHT KNEE EXAM:    Skin: intact, no ecchymosis or erythema; prepatellar swelling.  Squat: 100 %, not limited by pain.   causes diffuse discomfort.  ROM: full extension to 115 flexion, anterior and medial discomfort. Medial crepitus with range of motion.  Effusion: trace to small  Tender: severe medial joint line, posterior knee  NTTP lateral joint line.  McMurrays: positive medially.    MCL: stable, and non-painful at both 0 and 30 degrees knee flexion  Varus stress: stable, and non-painful at both 0 and 30 degrees knee flexion  Lachmans: neg, firm endpoint  Posterior Drawer stable  Patellofemoral joint:                Apprehension: negative              Crepitations: mild   Grind: positive.        X-RAY: no new images today. 3 views right knee from 10/28/2020 - no obvious fractures or dislocations. Moderate medial compartment narrowing with subchondral sclerosis, mild patello-femoral degenerative changes with marginal osteophytes.      ASSESSMENT/PLAN: Lance Hogue is a 64 year old male with chronic right knee pain, primary osteoarthritis.     * reviewed imaging studies with patient, showing arthritic changes, or wearing of the cartilage in the knee.  "This can be caused by normal \"wear and tear\" over the years or following prior injury to the knee. Suspect severity of the underlying osteoarthritis has progressed since these images were obtained in 2020.  * treatment usually nonsurgical to start, then can progress to surgical with total knee arthroplasty once nonsurgical management effective.    Non-surgical treatment for knee arthritis includes:    * rest, sitting  * Activity modification - avoid impact activities or activities that aggravate symptoms.  * NSAIDS (non-steroidal anti-inflammatory medications; e.g. Aleve, advil, motrin, ibuprofen) - regular use for inflammation ( twice daily or three times daily), with food, as long as no contra-indications Please discuss with primary care doctor if needed  * ice, 15-20 minutes at a time several times a day or as needed.  * Strengthening of quadriceps muscles  * Physical Therapy for strengthening, stretching and range of motion exercises of legs  * Tylenol as needed for pain, consider Tylenol arthritis or similar  * Weight loss: Weight loss:  There is no height or weight on file to calculate BMI.. weight loss benefits, not only for the current pain symptoms, but also overall health. Recommend a good diet plan that works for the patient, with the assistance of a dietician or primary care doctor as needed. Also, a good, low-impact exercise program for at least 20 minutes per day, 3 times per week, such as exercise bike, elliptical , or pool.  * Exercise: low impact such as stationary bike, elliptical, pool.  * Injections: cortisone versus viscosupplementation (hyaluronic acid, \"rooster comb\", \"gel shots\"); risks and perceived benefits discussed today. Patient elects to proceed.  * Bracing: bracing the knee may offer some relief of symptoms when worn and provide some stability.  * over the counter supplements such as glucosamine and chondroitin sulfate may help with joint pain.  * topical ointments may help as " well    * return to clinic as needed. Consider updated images in the future as needed.      Flash Boggs M.D., M.S.  Dept. of Orthopaedic Surgery  [facility]    Large Joint Injection/Arthocentesis: R knee joint    Date/Time: 12/5/2024 4:00 PM    Performed by: Flash Boggs MD  Authorized by: Flash Boggs MD    Indications:  Pain  Needle Size:  22 G  Guidance: landmark guided    Approach:  Anteromedial  Location:  Knee      Medications:  80 mg methylPREDNISolone 80 MG/ML; 4 mL BUPivacaine 0.25 %  Outcome:  Tolerated well, no immediate complications  Procedure discussed: discussed risks, benefits, and alternatives    Consent Given by:  Patient  Timeout: timeout called immediately prior to procedure    Prep: patient was prepped and draped in usual sterile fashion

## 2024-12-05 ENCOUNTER — OFFICE VISIT (OUTPATIENT)
Dept: ORTHOPEDICS | Facility: CLINIC | Age: 64
End: 2024-12-05
Payer: COMMERCIAL

## 2024-12-05 VITALS
SYSTOLIC BLOOD PRESSURE: 184 MMHG | DIASTOLIC BLOOD PRESSURE: 79 MMHG | HEART RATE: 80 BPM | HEIGHT: 73 IN | BODY MASS INDEX: 32.22 KG/M2 | WEIGHT: 243.1 LBS

## 2024-12-05 DIAGNOSIS — M17.11 PRIMARY OSTEOARTHRITIS OF RIGHT KNEE: Primary | ICD-10-CM

## 2024-12-05 RX ORDER — METHYLPREDNISOLONE ACETATE 80 MG/ML
80 INJECTION, SUSPENSION INTRA-ARTICULAR; INTRALESIONAL; INTRAMUSCULAR; SOFT TISSUE
Status: COMPLETED | OUTPATIENT
Start: 2024-12-05 | End: 2024-12-05

## 2024-12-05 RX ORDER — BUPIVACAINE HYDROCHLORIDE 2.5 MG/ML
4 INJECTION, SOLUTION INFILTRATION; PERINEURAL
Status: COMPLETED | OUTPATIENT
Start: 2024-12-05 | End: 2024-12-05

## 2024-12-05 RX ADMIN — METHYLPREDNISOLONE ACETATE 80 MG: 80 INJECTION, SUSPENSION INTRA-ARTICULAR; INTRALESIONAL; INTRAMUSCULAR; SOFT TISSUE at 16:00

## 2024-12-05 RX ADMIN — BUPIVACAINE HYDROCHLORIDE 4 ML: 2.5 INJECTION, SOLUTION INFILTRATION; PERINEURAL at 16:00

## 2024-12-05 ASSESSMENT — PAIN SCALES - GENERAL: PAINLEVEL_OUTOF10: EXTREME PAIN (8)

## 2024-12-05 NOTE — LETTER
12/5/2024      Lance Hogue  [address]  [place] [address]      Dear Colleague,    Thank you for referring your patient, Lance Hogue, to the [facility]. Please see a copy of my visit note below.      CHIEF COMPLAINT:   Chief Complaint   Patient presents with     Right Knee - Pain     Last injection 8/15/24. Injection worked pretty good. Lasting up till about a week ago. He has been doing a lot of things at work that are a little more strenuous. He would like another injection today.          HISTORY OF PRESENT ILLNESS    Lance Hogue is a 64 year old male seen for followup evaluation of ongoing right knee pain with no known injury. Last seen for injections 8/15/2024,  2/19/2024. Returns today stating the last injection worked well up until the past week or so. He's been doing a lot of things at work and has been more strenuous on his knee. He'd like another injection today.    He's retiring in 33 months.       Pain has been present for ~4-5 years, or so, progressively getting worse. Pain is constant, day and night, gets worse as the day goes on. Aggravated with bumping the knee, catching the foot, prolonged weight bearing activities. Better with rest, elevation, pillow behind the knee. Treatment has been a brace at work, icy hot at night, repeated cortisone injections.    denies low back pain, denies numbness and tingling, denies hip pain.    Present symptoms: pain medially  and anteriorly, pain sharp, shooting, dull/achy , moderate pain, mild swelling.    Pain severity: 8/10  Frequency of symptoms: are constant  Exacerbating Factors: weight bearing, stairs down more than up, prolonged standing  Relieving Factors: rest, sitting  Night Pain: Yes  Pain while at rest: No   Numbness or tingling: No   Patient has tried:     NSAIDS: No      Physical Therapy: Yes      Activity modification: No      Bracing: Yes      Injections: Yes   2/2024 and 8/2023 most  recently.     Ice: No      Assistive device:  No     Other: icy hot      Other PMH:  has a past medical history of Hyperlipidemia.  Patient Active Problem List   Diagnosis     Chronic pain of right knee     Primary osteoarthritis of right knee       Surgical Hx:  has a past surgical history that includes ENT surgery; Abdomen surgery; tonsillectomy; Laparoscopic herniorrhaphy inguinal (Bilateral, 11/8/2021); and Herniorrhaphy umbilical (N/A, 11/8/2021).    Medications:   Current Outpatient Medications:      atorvastatin (LIPITOR) 80 MG tablet, Take 1 tablet (80 mg) by mouth daily, Disp: 90 tablet, Rfl: 3     Cholecalciferol (VITAMIN D3 PO), Take by mouth daily, Disp: , Rfl:      fish oil-omega-3 fatty acids 1000 MG capsule, 1 cap(s), Disp: , Rfl:      Multiple Vitamin (MULTI VITAMIN) TABS, 1 tab(s), Disp: , Rfl:      tadalafil (CIALIS) 5 MG tablet, TAKE ONE TABLET BY MOUTH ONE TIME DAILY, Disp: 30 tablet, Rfl: 5    Current Facility-Administered Medications:      BUPivacaine (MARCAINE) 0.25 % injection 4 mL, 4 mL, , , , 4 mL at 08/15/24 1603     methylPREDNISolone (DEPO-Medrol) injection 80 mg, 80 mg, , , , 80 mg at 08/15/24 1603    Allergies:   Allergies   Allergen Reactions     Chocolate Diarrhea and Cramps     Cocoa      Other reaction(s): Abdominal cramping       Social Hx: .   reports that he has never smoked. He has quit using smokeless tobacco.  His smokeless tobacco use included snuff. He reports current alcohol use. He reports that he does not use drugs.    Family Hx: family history includes Alcoholism in his brother; Alzheimer Disease in his mother; Cancer in his mother, paternal grandfather, and paternal grandmother; Other - See Comments in his father.    REVIEW OF SYSTEMS:   CONSTITUTIONAL:NEGATIVE for fever, chills, change in weight  INTEGUMENTARY/SKIN: NEGATIVE for worrisome rashes, moles or lesions  MUSCULOSKELETAL:See HPI above  NEURO: NEGATIVE for weakness, dizziness or  paresthesias    PHYSICAL EXAM:  There were no vitals taken for this visit.   GENERAL APPEARANCE: healthy, alert, no distress   SKIN: no suspicious lesions or rashes  NEURO: Normal strength and tone, mentation intact and speech normal  PSYCH:  mentation appears normal and affect normal, not anxious  RESPIRATORY: No increased work of breathing.      BILATERAL LOWER EXTREMITIES:  Gait: slight favors the right.  Alignment: varus  No Quad atrophy, strength normal.  calf soft and nttp or squeeze.  Edema: 1+ bilateral.      LEFT KNEE EXAM:    Skin: intact, no ecchymosis or erythema  ROM: full extension to 125 flexion  Tight hamstrings on straight leg raise.  Effusion: none  Tender: NTTP med/lat joint line, anterior or posterior knee  McMurrays: negative    MCL: stable, and non-painful at both 0 and 30 degrees knee flexion  Varus stress: stable, and non-painful at both 0 and 30 degrees knee flexion  Lachmans: neg, firm endpoint  Posterior Drawer stable  Patellofemoral joint:                Apprehension: negative              Crepitations: minimal      RIGHT KNEE EXAM:    Skin: intact, no ecchymosis or erythema; prepatellar swelling.  Squat: 100 %, not limited by pain.   causes diffuse discomfort.  ROM: full extension to 115 flexion, anterior and medial discomfort. Medial crepitus with range of motion.  Effusion: trace to small  Tender: severe medial joint line, posterior knee  NTTP lateral joint line.  McMurrays: positive medially.    MCL: stable, and non-painful at both 0 and 30 degrees knee flexion  Varus stress: stable, and non-painful at both 0 and 30 degrees knee flexion  Lachmans: neg, firm endpoint  Posterior Drawer stable  Patellofemoral joint:                Apprehension: negative              Crepitations: mild   Grind: positive.        X-RAY: no new images today. 3 views right knee from 10/28/2020 - no obvious fractures or dislocations. Moderate medial compartment narrowing with subchondral sclerosis, mild  "patello-femoral degenerative changes with marginal osteophytes.      ASSESSMENT/PLAN: Lance Hogue is a 64 year old male with chronic right knee pain, primary osteoarthritis.     * reviewed imaging studies with patient, showing arthritic changes, or wearing of the cartilage in the knee. This can be caused by normal \"wear and tear\" over the years or following prior injury to the knee. Suspect severity of the underlying osteoarthritis has progressed since these images were obtained in 2020.  * treatment usually nonsurgical to start, then can progress to surgical with total knee arthroplasty once nonsurgical management effective.    Non-surgical treatment for knee arthritis includes:    * rest, sitting  * Activity modification - avoid impact activities or activities that aggravate symptoms.  * NSAIDS (non-steroidal anti-inflammatory medications; e.g. Aleve, advil, motrin, ibuprofen) - regular use for inflammation ( twice daily or three times daily), with food, as long as no contra-indications Please discuss with primary care doctor if needed  * ice, 15-20 minutes at a time several times a day or as needed.  * Strengthening of quadriceps muscles  * Physical Therapy for strengthening, stretching and range of motion exercises of legs  * Tylenol as needed for pain, consider Tylenol arthritis or similar  * Weight loss: Weight loss:  There is no height or weight on file to calculate BMI.. weight loss benefits, not only for the current pain symptoms, but also overall health. Recommend a good diet plan that works for the patient, with the assistance of a dietician or primary care doctor as needed. Also, a good, low-impact exercise program for at least 20 minutes per day, 3 times per week, such as exercise bike, elliptical , or pool.  * Exercise: low impact such as stationary bike, elliptical, pool.  * Injections: cortisone versus viscosupplementation (hyaluronic acid, \"rooster comb\", \"gel shots\"); risks and " perceived benefits discussed today. Patient elects to proceed.  * Bracing: bracing the knee may offer some relief of symptoms when worn and provide some stability.  * over the counter supplements such as glucosamine and chondroitin sulfate may help with joint pain.  * topical ointments may help as well    * return to clinic as needed. Consider updated images in the future as needed.      Flash Boggs M.D., M.S.  Dept. of Orthopaedic Surgery  [facility]    Large Joint Injection/Arthocentesis: R knee joint    Date/Time: 12/5/2024 4:00 PM    Performed by: Flash Boggs MD  Authorized by: Flash Boggs MD    Indications:  Pain  Needle Size:  22 G  Guidance: landmark guided    Approach:  Anteromedial  Location:  Knee      Medications:  80 mg methylPREDNISolone 80 MG/ML; 4 mL BUPivacaine 0.25 %  Outcome:  Tolerated well, no immediate complications  Procedure discussed: discussed risks, benefits, and alternatives    Consent Given by:  Patient  Timeout: timeout called immediately prior to procedure    Prep: patient was prepped and draped in usual sterile fashion          Again, thank you for allowing me to participate in the care of your patient.        Sincerely,        Flash Boggs MD

## 2025-01-13 ENCOUNTER — OFFICE VISIT (OUTPATIENT)
Dept: FAMILY MEDICINE | Facility: CLINIC | Age: 65
End: 2025-01-13
Payer: COMMERCIAL

## 2025-01-13 VITALS
BODY MASS INDEX: 31.85 KG/M2 | TEMPERATURE: 97.8 F | SYSTOLIC BLOOD PRESSURE: 138 MMHG | OXYGEN SATURATION: 95 % | HEART RATE: 67 BPM | RESPIRATION RATE: 16 BRPM | HEIGHT: 73 IN | WEIGHT: 240.3 LBS | DIASTOLIC BLOOD PRESSURE: 82 MMHG

## 2025-01-13 DIAGNOSIS — Z00.00 ROUTINE GENERAL MEDICAL EXAMINATION AT A HEALTH CARE FACILITY: ICD-10-CM

## 2025-01-13 DIAGNOSIS — E78.5 HYPERLIPIDEMIA WITH TARGET LDL LESS THAN 160: Primary | ICD-10-CM

## 2025-01-13 DIAGNOSIS — Z12.5 SCREENING FOR PROSTATE CANCER: ICD-10-CM

## 2025-01-13 LAB
CHOLEST SERPL-MCNC: 173 MG/DL
FASTING STATUS PATIENT QL REPORTED: YES
HDLC SERPL-MCNC: 52 MG/DL
LDLC SERPL CALC-MCNC: 106 MG/DL
NONHDLC SERPL-MCNC: 121 MG/DL
PSA SERPL DL<=0.01 NG/ML-MCNC: 1.04 NG/ML (ref 0–4.5)
TRIGL SERPL-MCNC: 74 MG/DL

## 2025-01-13 PROCEDURE — 36415 COLL VENOUS BLD VENIPUNCTURE: CPT | Performed by: FAMILY MEDICINE

## 2025-01-13 PROCEDURE — 90677 PCV20 VACCINE IM: CPT | Performed by: FAMILY MEDICINE

## 2025-01-13 PROCEDURE — 80061 LIPID PANEL: CPT | Performed by: FAMILY MEDICINE

## 2025-01-13 PROCEDURE — 90471 IMMUNIZATION ADMIN: CPT | Performed by: FAMILY MEDICINE

## 2025-01-13 PROCEDURE — G0103 PSA SCREENING: HCPCS | Performed by: FAMILY MEDICINE

## 2025-01-13 PROCEDURE — 99396 PREV VISIT EST AGE 40-64: CPT | Mod: 25 | Performed by: FAMILY MEDICINE

## 2025-01-13 SDOH — HEALTH STABILITY: PHYSICAL HEALTH: ON AVERAGE, HOW MANY MINUTES DO YOU ENGAGE IN EXERCISE AT THIS LEVEL?: 20 MIN

## 2025-01-13 SDOH — HEALTH STABILITY: PHYSICAL HEALTH: ON AVERAGE, HOW MANY DAYS PER WEEK DO YOU ENGAGE IN MODERATE TO STRENUOUS EXERCISE (LIKE A BRISK WALK)?: 5 DAYS

## 2025-01-13 ASSESSMENT — PAIN SCALES - GENERAL: PAINLEVEL_OUTOF10: NO PAIN (0)

## 2025-01-13 ASSESSMENT — SOCIAL DETERMINANTS OF HEALTH (SDOH): HOW OFTEN DO YOU GET TOGETHER WITH FRIENDS OR RELATIVES?: PATIENT DECLINED

## 2025-01-13 NOTE — PROGRESS NOTES
"Preventive Care Visit  [facility] RADHA Perez MD, Family Medicine  Jan 13, 2025      Assessment & Plan     Hyperlipidemia with target LDL less than 160  - Lipid panel reflex to direct LDL Non-fasting; Future    Routine general medical examination at a health care facility    Screening for prostate cancer  - PSA, screen; Future    Patient has been advised of split billing requirements and indicates understanding: No    BMI  Estimated body mass index is 31.7 kg/m  as calculated from the following:    Height as of this encounter: 1.854 m (6' 1\").    Weight as of this encounter: 109 kg (240 lb 4.8 oz).   Weight management plan: Discussed healthy diet and exercise guidelines    Counseling  Appropriate preventive services were addressed with this patient via screening, questionnaire, or discussion as appropriate for fall prevention, nutrition, physical activity, Tobacco-use cessation, social engagement, weight loss and cognition.  Checklist reviewing preventive services available has been given to the patient.  Reviewed patient's diet, addressing concerns and/or questions.   The patient reports drinking more than 3 alcoholic drinks per day and/or more than 7 drhnks per week. The patient was counseled and given information about possible harmful effects of excessive alcohol intake.      Malik Lucas is a 64 year old, presenting for the following:  Physical        1/13/2025     1:22 PM   Additional Questions   Roomed by Val Allen CMA   Accompanied by Self          HPI    -Declined all vaccines today.    Health Care Directive  Patient does not have a Health Care Directive: Discussed advance care planning with patient; information given to patient to review.        1/13/2025   General Health   How would you rate your overall physical health? Good   Feel stress (tense, anxious, or unable to sleep) Not at all         1/13/2025   Nutrition   Three or more servings of calcium each day? (!) NO "   Diet: Regular (no restrictions)   How many servings of fruit and vegetables per day? (!) 0-1   How many sweetened beverages each day? 0-1         1/13/2025   Exercise   Days per week of moderate/strenous exercise 5 days   Average minutes spent exercising at this level 20 min         1/13/2025   Social Factors   Frequency of gathering with friends or relatives Patient declined   Worry food won't last until get money to buy more No   Food not last or not have enough money for food? No   Do you have housing? (Housing is defined as stable permanent housing and does not include staying ouside in a car, in a tent, in an abandoned building, in an overnight shelter, or couch-surfing.) Yes   Are you worried about losing your housing? No   Lack of transportation? No   Unable to get utilities (heat,electricity)? No         1/13/2025   Fall Risk   Fallen 2 or more times in the past year? No   Trouble with walking or balance? No          1/13/2025   Dental   Dentist two times every year? Yes         1/13/2025   TB Screening   Were you born outside of the [place]? No         Today's PHQ-2 Score:       1/13/2025     1:05 PM   PHQ-2 ( 1999 [employer])   Q1: Little interest or pleasure in doing things 0   Q2: Feeling down, depressed or hopeless 0   PHQ-2 Score 0    Q1: Little interest or pleasure in doing things Not at all   Q2: Feeling down, depressed or hopeless Not at all   PHQ-2 Score 0       Patient-reported           1/13/2025   Substance Use   Alcohol more than 3/day or more than 7/wk Yes   How often do you have a drink containing alcohol 4 or more times a week   How many alcohol drinks on typical day 1 or 2   How often do you have 5+ drinks at one occasion Never   Audit 2/3 Score 0   How often not able to stop drinking once started Never   How often failed to do what normally expected Never   How often needed first drink in am after a heavy drinking session Never   How often feeling of guilt or remorse after drinking Never   How  "often unable to remember what happened the night before Never   Have you or someone else been injured because of your drinking No   Has anyone been concerned or suggested you cut down on drinking No   TOTAL SCORE - AUDIT 4   Do you use any other substances recreationally? No     Social History     Tobacco Use    Smoking status: Never    Smokeless tobacco: Former     Types: Snuff   Vaping Use    Vaping status: Never Used   Substance Use Topics    Alcohol use: Yes    Drug use: No           1/13/2025   STI Screening   New sexual partner(s) since last STI/HIV test? No   Last PSA:   PSA   Date Value Ref Range Status   09/28/2020 1.34 0 - 4 ug/L Final     Comment:     Assay Method:  Chemiluminescence using Siemens Vista analyzer     Prostate Specific Antigen Screen   Date Value Ref Range Status   01/09/2023 1.06 0.00 - 4.50 ng/mL Final     ASCVD Risk   The 10-year ASCVD risk score (Alexis MAZARIEGOS, et al., 2019) is: 9.6%    Values used to calculate the score:      Age: 64 years      Sex: Male      Is Non- : No      Diabetic: No      Tobacco smoker: No      Systolic Blood Pressure: 138 mmHg      Is BP treated: No      HDL Cholesterol: 52 mg/dL      Total Cholesterol: 165 mg/dL         Reviewed and updated as needed this visit by Provider                    Review of Systems  Constitutional, HEENT, cardiovascular, pulmonary, gi and gu systems are negative, except as otherwise noted.     Objective    Exam  /82 (BP Location: Right arm, Patient Position: Sitting, Cuff Size: Adult Large)   Pulse 67   Temp 97.8  F (36.6  C) (Tympanic)   Resp 16   Ht 1.854 m (6' 1\")   Wt 109 kg (240 lb 4.8 oz)   SpO2 95%   BMI 31.70 kg/m     Estimated body mass index is 31.7 kg/m  as calculated from the following:    Height as of this encounter: 1.854 m (6' 1\").    Weight as of this encounter: 109 kg (240 lb 4.8 oz).    Physical Exam  GENERAL: alert and no distress  NECK: no adenopathy, no asymmetry, " masses, or scars  RESP: lungs clear to auscultation - no rales, rhonchi or wheezes  CV: regular rate and rhythm, normal S1 S2, no S3 or S4, no murmur, click or rub, no peripheral edema  ABDOMEN: soft, nontender, no hepatosplenomegaly, no masses and bowel sounds normal  MS: no gross musculoskeletal defects noted, no edema    Signed Electronically by: Brian Perez MD

## 2025-01-13 NOTE — PATIENT INSTRUCTIONS
Patient Education   Preventive Care Advice   This is general advice given by our system to help you stay healthy. However, your care team may have specific advice just for you. Please talk to your care team about your preventive care needs.  Nutrition  Eat 5 or more servings of fruits and vegetables each day.  Try wheat bread, brown rice and whole grain pasta (instead of white bread, rice, and pasta).  Get enough calcium and vitamin D. Check the label on foods and aim for 100% of the RDA (recommended daily allowance).  Lifestyle  Exercise at least 150 minutes each week  (30 minutes a day, 5 days a week).  Do muscle strengthening activities 2 days a week. These help control your weight and prevent disease.  No smoking.  Wear sunscreen to prevent skin cancer.  Have a dental exam and cleaning every 6 months.  Yearly exams  See your health care team every year to talk about:  Any changes in your health.  Any medicines your care team has prescribed.  Preventive care, family planning, and ways to prevent chronic diseases.  Shots (vaccines)   HPV shots (up to age 26), if you've never had them before.  Hepatitis B shots (up to age 59), if you've never had them before.  COVID-19 shot: Get this shot when it's due.  Flu shot: Get a flu shot every year.  Tetanus shot: Get a tetanus shot every 10 years.  Pneumococcal, hepatitis A, and RSV shots: Ask your care team if you need these based on your risk.  Shingles shot (for age 50 and up)  General health tests  Diabetes screening:  Starting at age 35, Get screened for diabetes at least every 3 years.  If you are younger than age 35, ask your care team if you should be screened for diabetes.  Cholesterol test: At age 39, start having a cholesterol test every 5 years, or more often if advised.  Bone density scan (DEXA): At age 50, ask your care team if you should have this scan for osteoporosis (brittle bones).  Hepatitis C: Get tested at least once in your life.  STIs (sexually  transmitted infections)  Before age 24: Ask your care team if you should be screened for STIs.  After age 24: Get screened for STIs if you're at risk. You are at risk for STIs (including HIV) if:  You are sexually active with more than one person.  You don't use condoms every time.  You or a partner was diagnosed with a sexually transmitted infection.  If you are at risk for HIV, ask about PrEP medicine to prevent HIV.  Get tested for HIV at least once in your life, whether you are at risk for HIV or not.  Cancer screening tests  Cervical cancer screening: If you have a cervix, begin getting regular cervical cancer screening tests starting at age 21.  Breast cancer scan (mammogram): If you've ever had breasts, begin having regular mammograms starting at age 40. This is a scan to check for breast cancer.  Colon cancer screening: It is important to start screening for colon cancer at age 45.  Have a colonoscopy test every 10 years (or more often if you're at risk) Or, ask your provider about stool tests like a FIT test every year or Cologuard test every 3 years.  To learn more about your testing options, visit:   .  For help making a decision, visit:   https://bit.ly/ze28371.  Prostate cancer screening test: If you have a prostate, ask your care team if a prostate cancer screening test (PSA) at age 55 is right for you.  Lung cancer screening: If you are a current or former smoker ages 50 to 80, ask your care team if ongoing lung cancer screenings are right for you.  For informational purposes only. Not to replace the advice of your health care provider. Copyright   2023 [facility]. All rights reserved. Clinically reviewed by the [facility]. [employer] 824426 - REV 01/24.  9 Ways to Cut Back on Drinking  Maybe you've found yourself drinking more alcohol than you'd prefer. If you want to cut back, here are some ideas to try.    Think before you drink.  Do you really want a drink,  "or is it just a habit? If you're used to having a drink at a certain time, try doing something else then.     Look for substitutes.  Find some no-alcohol drinks that you enjoy, like flavored seltzer water, tea with honey, or tonic with a slice of lime. Or try alcohol-free beer or \"virgin\" cocktails (without the alcohol).     Drink more water.  Use water to quench your thirst. Drink a glass of water before you have any alcohol. Have another glass along with every drink or between drinks.     Shrink your drink.  For example, have a bottle of beer instead of a pint. Use a smaller glass for wine. Choose drinks with lower alcohol content (ABV%). Or use less liquor and more mixer in cocktails.     Slow down.  It's easy to drink quickly and without thinking about it. Pay attention, and make each drink last longer.     Do the math.  Total up how much you spend on alcohol each month. How much is that a year? If you cut back, what could you do with the money you save?     Take a break.  Choose a day or two each week when you won't drink at all. Notice how you feel on those days, physically and emotionally. How did you sleep? Do you feel better? Over time, add more break days.     Count calories.  Would you like to lose some weight? For some people that's a good motivator for cutting back. Figure out how many calories are in each drink. How many does that add up to in a day? In a week? In a month?     Practice saying no.  Be ready when someone offers you a drink. Try: \"Thanks, I've had enough.\" Or \"Thanks, but I'm cutting back.\" Or \"No, thanks. I feel better when I drink less.\"   Current as of: November 15, 2023  Content Version: 14.3    2024 [employer].   Care instructions adapted under license by your healthcare professional. If you have questions about a medical condition or this instruction, always ask your healthcare professional. [employer] disclaims any warranty or liability for your use of this " information.

## 2025-02-27 DIAGNOSIS — E78.5 HYPERLIPIDEMIA WITH TARGET LDL LESS THAN 160: ICD-10-CM

## 2025-02-27 RX ORDER — ATORVASTATIN CALCIUM 80 MG/1
80 TABLET, FILM COATED ORAL DAILY
Qty: 90 TABLET | Refills: 2 | Status: SHIPPED | OUTPATIENT
Start: 2025-02-27

## 2025-03-17 DIAGNOSIS — N52.03 COMBINED ARTERIAL INSUFFICIENCY AND CORPORO-VENOUS OCCLUSIVE ERECTILE DYSFUNCTION: ICD-10-CM

## 2025-03-17 RX ORDER — TADALAFIL 5 MG/1
TABLET ORAL
Qty: 30 TABLET | Refills: 0 | Status: SHIPPED | OUTPATIENT
Start: 2025-03-17

## 2025-04-04 NOTE — PROGRESS NOTES
CHIEF COMPLAINT:   Chief Complaint   Patient presents with    Right Knee - Pain     Lance is here today for right knee pain, looking for a repeat steroid injection.  Symptoms returned 2.5-3 months after the injection.  Overall he is doing well otherwise.  Swelling has returned as well.           HISTORY OF PRESENT ILLNESS    Lance Hogue is a 64 year old male seen for followup evaluation of ongoing right knee pain with no known injury. Last seen for injections 12/5/2024,  8/15/2024,  2/19/2024. Returns today stating the last injection worked well up until the past week or so. He's been doing a lot of things at work and has been more strenuous on his knee. He'd like another injection today.    He's retiring         Pain has been present for ~4-5 years, or so, progressively getting worse. Pain is constant, day and night, gets worse as the day goes on. Aggravated with bumping the knee, catching the foot, prolonged weight bearing activities. Better with rest, elevation, pillow behind the knee. Treatment has been a brace at work, icy hot at night, repeated cortisone injections.    denies low back pain, denies numbness and tingling, denies hip pain.    Present symptoms: pain medially  and anteriorly, pain sharp, shooting, dull/achy , moderate pain, mild swelling.    Pain severity: 8/10  Frequency of symptoms: are constant  Exacerbating Factors: weight bearing, stairs down more than up, prolonged standing  Relieving Factors: rest, sitting  Night Pain: Yes  Pain while at rest: No   Numbness or tingling: No   Patient has tried:     NSAIDS: No      Physical Therapy: Yes      Activity modification: No      Bracing: Yes      Injections: Yes   12/5/2024, 2/2024 and 8/2023 most recently.     Ice: No      Assistive device:  No     Other: icy hot      Other PMH:  has a past medical history of Hyperlipidemia.  Patient Active Problem List   Diagnosis    Chronic pain of right knee    Primary osteoarthritis of right knee  "      Surgical Hx:  has a past surgical history that includes ENT surgery; Abdomen surgery; tonsillectomy; Laparoscopic herniorrhaphy inguinal (Bilateral, 11/8/2021); and Herniorrhaphy umbilical (N/A, 11/8/2021).    Medications:   Current Outpatient Medications:     atorvastatin (LIPITOR) 80 MG tablet, TAKE 1 TABLET BY MOUTH DAILY, Disp: 90 tablet, Rfl: 2    Cholecalciferol (VITAMIN D3 PO), Take by mouth daily, Disp: , Rfl:     fish oil-omega-3 fatty acids 1000 MG capsule, 1 cap(s), Disp: , Rfl:     Multiple Vitamin (MULTI VITAMIN) TABS, 1 tab(s), Disp: , Rfl:     tadalafil (CIALIS) 5 MG tablet, TAKE ONE TABLET BY MOUTH ONE TIME DAILY, Disp: 30 tablet, Rfl: 0    Allergies:   Allergies   Allergen Reactions    Chocolate Diarrhea and Cramps    Cocoa      Other reaction(s): Abdominal cramping       Social Hx: .   reports that he has never smoked. He has quit using smokeless tobacco.  His smokeless tobacco use included snuff. He reports current alcohol use. He reports that he does not use drugs.    Family Hx: family history includes Alcoholism in his brother; Alzheimer Disease in his mother; Cancer in his mother, paternal grandfather, and paternal grandmother; Other - See Comments in his father.    REVIEW OF SYSTEMS:   CONSTITUTIONAL:NEGATIVE for fever, chills, change in weight  INTEGUMENTARY/SKIN: NEGATIVE for worrisome rashes, moles or lesions  MUSCULOSKELETAL:See HPI above  NEURO: NEGATIVE for weakness, dizziness or paresthesias    PHYSICAL EXAM:  Ht 1.854 m (6' 1\")   Wt 108 kg (238 lb)   BMI 31.40 kg/m     GENERAL APPEARANCE: healthy, alert, no distress   SKIN: no suspicious lesions or rashes  NEURO: Normal strength and tone, mentation intact and speech normal  PSYCH:  mentation appears normal and affect normal, not anxious  RESPIRATORY: No increased work of breathing.      BILATERAL LOWER EXTREMITIES:  Gait: slight favors the right.  Alignment: varus  No Quad atrophy, strength normal.  calf soft " "and nttp or squeeze.  Edema: 1+ bilateral.      LEFT KNEE EXAM:    Skin: intact, no ecchymosis or erythema  ROM: full extension to 125 flexion  Tight hamstrings on straight leg raise.  Effusion: none  Tender: NTTP med/lat joint line, anterior or posterior knee  McMurrays: negative    MCL: stable, and non-painful at both 0 and 30 degrees knee flexion  Varus stress: stable, and non-painful at both 0 and 30 degrees knee flexion  Lachmans: neg, firm endpoint  Posterior Drawer stable  Patellofemoral joint:                Apprehension: negative              Crepitations: minimal      RIGHT KNEE EXAM:    Skin: intact, no ecchymosis or erythema; prepatellar swelling.  Squat: 100 %, not limited by pain.   causes diffuse discomfort.  ROM: full extension to 115 flexion, anterior and medial discomfort. Medial crepitus with range of motion.  Effusion: trace to small  Tender: severe medial joint line, posterior knee  NTTP lateral joint line.  McMurrays: positive medially.    MCL: stable, and non-painful at both 0 and 30 degrees knee flexion  Varus stress: stable, and non-painful at both 0 and 30 degrees knee flexion  Lachmans: neg, firm endpoint  Posterior Drawer stable  Patellofemoral joint:                Apprehension: negative              Crepitations: mild   Grind: positive.        X-RAY: no new images today. 3 views right knee from 10/28/2020 - no obvious fractures or dislocations. Moderate medial compartment narrowing with subchondral sclerosis, mild patello-femoral degenerative changes with marginal osteophytes.      ASSESSMENT/PLAN: Lance Hogue is a 64 year old male with chronic right knee pain, primary osteoarthritis.     * reviewed imaging studies with patient, showing arthritic changes, or wearing of the cartilage in the knee. This can be caused by normal \"wear and tear\" over the years or following prior injury to the knee. Suspect severity of the underlying osteoarthritis has progressed since these images were " "obtained in 2020.  * treatment usually nonsurgical to start, then can progress to surgical with total knee arthroplasty once nonsurgical management effective.    Non-surgical treatment for knee arthritis includes:    * rest, sitting  * Activity modification - avoid impact activities or activities that aggravate symptoms.  * NSAIDS (non-steroidal anti-inflammatory medications; e.g. Aleve, advil, motrin, ibuprofen) - regular use for inflammation ( twice daily or three times daily), with food, as long as no contra-indications Please discuss with primary care doctor if needed  * ice, 15-20 minutes at a time several times a day or as needed.  * Strengthening of quadriceps muscles  * Physical Therapy for strengthening, stretching and range of motion exercises of legs  * Tylenol as needed for pain, consider Tylenol arthritis or similar  * Weight loss: Weight loss:  Body mass index is 31.4 kg/m .. weight loss benefits, not only for the current pain symptoms, but also overall health. Recommend a good diet plan that works for the patient, with the assistance of a dietician or primary care doctor as needed. Also, a good, low-impact exercise program for at least 20 minutes per day, 3 times per week, such as exercise bike, elliptical , or pool.  * Exercise: low impact such as stationary bike, elliptical, pool.  * Injections: cortisone versus viscosupplementation (hyaluronic acid, \"rooster comb\", \"gel shots\"); risks and perceived benefits discussed today. Patient elects to proceed.  * Bracing: bracing the knee may offer some relief of symptoms when worn and provide some stability.  * over the counter supplements such as glucosamine and chondroitin sulfate may help with joint pain.  * topical ointments may help as well    * return to clinic as needed. Consider updated images in the future as needed.      Flash Boggs M.D., M.S.  Dept. of Orthopaedic Surgery  [facility]    Large Joint " Injection/Arthocentesis: R knee joint    Date/Time: 4/10/2025 3:53 PM    Performed by: Flash Boggs MD  Authorized by: Flash Boggs MD    Indications:  Pain  Needle Size:  22 G  Guidance: landmark guided    Approach:  Anteromedial  Location:  Knee      Medications:  80 mg methylPREDNISolone 80 MG/ML; 4 mL BUPivacaine 0.25 %  Outcome:  Tolerated well, no immediate complications  Procedure discussed: discussed risks, benefits, and alternatives    Consent Given by:  Patient  Timeout: timeout called immediately prior to procedure    Prep: patient was prepped and draped in usual sterile fashion

## 2025-04-10 ENCOUNTER — OFFICE VISIT (OUTPATIENT)
Dept: ORTHOPEDICS | Facility: CLINIC | Age: 65
End: 2025-04-10
Payer: COMMERCIAL

## 2025-04-10 VITALS — HEIGHT: 73 IN | BODY MASS INDEX: 31.54 KG/M2 | WEIGHT: 238 LBS

## 2025-04-10 DIAGNOSIS — M17.11 PRIMARY OSTEOARTHRITIS OF RIGHT KNEE: Primary | ICD-10-CM

## 2025-04-10 RX ORDER — BUPIVACAINE HYDROCHLORIDE 2.5 MG/ML
4 INJECTION, SOLUTION INFILTRATION; PERINEURAL
Status: COMPLETED | OUTPATIENT
Start: 2025-04-10 | End: 2025-04-10

## 2025-04-10 RX ORDER — METHYLPREDNISOLONE ACETATE 80 MG/ML
80 INJECTION, SUSPENSION INTRA-ARTICULAR; INTRALESIONAL; INTRAMUSCULAR; SOFT TISSUE
Status: COMPLETED | OUTPATIENT
Start: 2025-04-10 | End: 2025-04-10

## 2025-04-10 RX ADMIN — METHYLPREDNISOLONE ACETATE 80 MG: 80 INJECTION, SUSPENSION INTRA-ARTICULAR; INTRALESIONAL; INTRAMUSCULAR; SOFT TISSUE at 15:53

## 2025-04-10 RX ADMIN — BUPIVACAINE HYDROCHLORIDE 4 ML: 2.5 INJECTION, SOLUTION INFILTRATION; PERINEURAL at 15:53

## 2025-04-10 ASSESSMENT — PAIN SCALES - GENERAL: PAINLEVEL_OUTOF10: SEVERE PAIN (7)

## 2025-04-10 NOTE — LETTER
Service: Orthopaedics     Subjective Data:   ELSA PERKINS is a 56 year old Female who is Hospital Day # 2 and POD #1 for 1. RIGHT TOTAL HIP ARTHROPLASTY;    Additional Information:    Well-controlled right hip pain    Objective Data:     Objective Information:      T   P  R  BP   MAP  SpO2   Value  36.7  77  16  118/79   89  98%  Date/Time 7/22 7:00 7/22 7:55 7/22 7:00 7/22 7:00  7/22 7:00 7/22 7:55  Range  (36.2C - 36.7C )  (65 - 77 )  (16 - 18 )  (105 - 131 )/ (69 - 90 )  (81 - 99 )  (94% - 98% )   As of 22-Jul-2023 07:55:00, patient is on 0 L/min of oxygen via nasal cannula.      Pain reported at 7/22 10:20: 2 = Mild    Physical Exam Narrative:  ·  Physical Exam:    Right hip dressing clean, dry and intact  Intact ankle dorsiflexion and plantarflexion  Calf soft, Homans negative    Medication:    Medications:          Continuous Medications       --------------------------------    1. Lactated Ringers Infusion:  1000  mL  IntraVenous  <Continuous>         Scheduled Medications       --------------------------------    1. Acetaminophen:  650  mg  Oral  Every 6 Hours    2. Aspirin Enteric Coated:  81  mg  Oral  2 Times a Day    3. Budesonide 0.5 mg/ 2 mL Nebulizer Soln:  2  mL  Inhalation  Every 12 Hours    4. Docusate:  100  mg  Oral  2 Times a Day    5. Formoterol 20 microgram/ 2 mL Neb Soln:  2  mL  Inhalation  Every 12 Hours    6. Polyethylene Glycol:  17  gram(s)  Oral  2 Times a Day         PRN Medications       --------------------------------    1. Albuterol 2.5 mg - Ipratropium 0.5 mg/ 3 mL Neb Soln:  3  mL  Inhalation  Every 4 Hours    2. Cyclobenzaprine:  10  mg  Oral  3 Times a Day    3. HYDROmorphone Injectable:  0.4  mg  IntraVenous Push  Every 2 Hours    4. Ketorolac Injectable:  15  mg  IntraVenous Push  Every 6 Hours    5. Magnesium Hydroxide -Al Hydrox -Simethicone Oral Liquid:  30  mL  Oral  Every 6 Hours    6. Ondansetron Injectable:  4  mg  IntraVenous Push  Every 6 Hours    7.  4/10/2025      Lance Hogue  [id number]  [facility] [address]      Dear Colleague,    Thank you for referring your patient, Lance Hogue, to the [facility]. Please see a copy of my visit note below.      CHIEF COMPLAINT:   Chief Complaint   Patient presents with     Right Knee - Pain     Lance is here today for right knee pain, looking for a repeat steroid injection.  Symptoms returned 2.5-3 months after the injection.  Overall he is doing well otherwise.  Swelling has returned as well.           HISTORY OF PRESENT ILLNESS    Lance Hogue is a 64 year old male seen for followup evaluation of ongoing right knee pain with no known injury. Last seen for injections 12/5/2024,  8/15/2024,  2/19/2024. Returns today stating the last injection worked well up until the past week or so. He's been doing a lot of things at work and has been more strenuous on his knee. He'd like another injection today.    He's retiring         Pain has been present for ~4-5 years, or so, progressively getting worse. Pain is constant, day and night, gets worse as the day goes on. Aggravated with bumping the knee, catching the foot, prolonged weight bearing activities. Better with rest, elevation, pillow behind the knee. Treatment has been a brace at work, icy hot at night, repeated cortisone injections.    denies low back pain, denies numbness and tingling, denies hip pain.    Present symptoms: pain medially  and anteriorly, pain sharp, shooting, dull/achy , moderate pain, mild swelling.    Pain severity: 8/10  Frequency of symptoms: are constant  Exacerbating Factors: weight bearing, stairs down more than up, prolonged standing  Relieving Factors: rest, sitting  Night Pain: Yes  Pain while at rest: No   Numbness or tingling: No   Patient has tried:     NSAIDS: No      Physical Therapy: Yes      Activity modification: No      Bracing: Yes      Injections: Yes   12/5/2024, 2/2024 and 8/2023  "most recently.     Ice: No      Assistive device:  No     Other: icy hot      Other PMH:  has a past medical history of Hyperlipidemia.  Patient Active Problem List   Diagnosis     Chronic pain of right knee     Primary osteoarthritis of right knee       Surgical Hx:  has a past surgical history that includes ENT surgery; Abdomen surgery; tonsillectomy; Laparoscopic herniorrhaphy inguinal (Bilateral, 11/8/2021); and Herniorrhaphy umbilical (N/A, 11/8/2021).    Medications:   Current Outpatient Medications:      atorvastatin (LIPITOR) 80 MG tablet, TAKE 1 TABLET BY MOUTH DAILY, Disp: 90 tablet, Rfl: 2     Cholecalciferol (VITAMIN D3 PO), Take by mouth daily, Disp: , Rfl:      fish oil-omega-3 fatty acids 1000 MG capsule, 1 cap(s), Disp: , Rfl:      Multiple Vitamin (MULTI VITAMIN) TABS, 1 tab(s), Disp: , Rfl:      tadalafil (CIALIS) 5 MG tablet, TAKE ONE TABLET BY MOUTH ONE TIME DAILY, Disp: 30 tablet, Rfl: 0    Allergies:   Allergies   Allergen Reactions     Chocolate Diarrhea and Cramps     Cocoa      Other reaction(s): Abdominal cramping       Social Hx: .   reports that he has never smoked. He has quit using smokeless tobacco.  His smokeless tobacco use included snuff. He reports current alcohol use. He reports that he does not use drugs.    Family Hx: family history includes Alcoholism in his brother; Alzheimer Disease in his mother; Cancer in his mother, paternal grandfather, and paternal grandmother; Other - See Comments in his father.    REVIEW OF SYSTEMS:   CONSTITUTIONAL:NEGATIVE for fever, chills, change in weight  INTEGUMENTARY/SKIN: NEGATIVE for worrisome rashes, moles or lesions  MUSCULOSKELETAL:See HPI above  NEURO: NEGATIVE for weakness, dizziness or paresthesias    PHYSICAL EXAM:  Ht 1.854 m (6' 1\")   Wt 108 kg (238 lb)   BMI 31.40 kg/m     GENERAL APPEARANCE: healthy, alert, no distress   SKIN: no suspicious lesions or rashes  NEURO: Normal strength and tone, mentation intact and " oxyCODONE Immediate Release:  5  mg  Oral  Every 4 Hours    8. oxyCODONE Immediate Release:  10  mg  Oral  Every 4 Hours    9. traMADol:  50  mg  Oral  Every 6 Hours        Recent Lab Results:    Results:    CBC: 7/22/2023 04:46              \     Hgb     /                              \     11.2 L    /  WBC  ----------------  Plt               11.5 H    ----------------    176              /     Hct     \                              /     34.5 L    \            RBC: 3.47 L    MCV: 99     Neutrophil %: 79.5    Radiology Results:    Results:        Impression:    Status post right hip arthroplasty.  The hip is reduced, no fracture identified        Xray Pelvis 1 or 2 View [Jul 21 2023  6:15PM]      Assessment and Plan:   Code Status:  ·  Code Status Full Code       Impression 1: Postop day 1 right total hip arthroplasty   Plan for Impression 1: Continue current pain medication  regimen  Aspirin for DVT prophylaxis  Discharge today       Electronic Signatures:  Nitish Zepeda)  (Signed 22-Jul-2023 11:10)   Authored: Service, Subjective Data, Objective Data, Assessment  and Plan, Note Completion      Last Updated: 22-Jul-2023 11:10 by Nitish Zepeda)    speech normal  PSYCH:  mentation appears normal and affect normal, not anxious  RESPIRATORY: No increased work of breathing.      BILATERAL LOWER EXTREMITIES:  Gait: slight favors the right.  Alignment: varus  No Quad atrophy, strength normal.  calf soft and nttp or squeeze.  Edema: 1+ bilateral.      LEFT KNEE EXAM:    Skin: intact, no ecchymosis or erythema  ROM: full extension to 125 flexion  Tight hamstrings on straight leg raise.  Effusion: none  Tender: NTTP med/lat joint line, anterior or posterior knee  McMurrays: negative    MCL: stable, and non-painful at both 0 and 30 degrees knee flexion  Varus stress: stable, and non-painful at both 0 and 30 degrees knee flexion  Lachmans: neg, firm endpoint  Posterior Drawer stable  Patellofemoral joint:                Apprehension: negative              Crepitations: minimal      RIGHT KNEE EXAM:    Skin: intact, no ecchymosis or erythema; prepatellar swelling.  Squat: 100 %, not limited by pain.   causes diffuse discomfort.  ROM: full extension to 115 flexion, anterior and medial discomfort. Medial crepitus with range of motion.  Effusion: trace to small  Tender: severe medial joint line, posterior knee  NTTP lateral joint line.  McMurrays: positive medially.    MCL: stable, and non-painful at both 0 and 30 degrees knee flexion  Varus stress: stable, and non-painful at both 0 and 30 degrees knee flexion  Lachmans: neg, firm endpoint  Posterior Drawer stable  Patellofemoral joint:                Apprehension: negative              Crepitations: mild   Grind: positive.        X-RAY: no new images today. 3 views right knee from 10/28/2020 - no obvious fractures or dislocations. Moderate medial compartment narrowing with subchondral sclerosis, mild patello-femoral degenerative changes with marginal osteophytes.      ASSESSMENT/PLAN: Lance Hogue is a 64 year old male with chronic right knee pain, primary osteoarthritis.     * reviewed imaging studies with patient,  "showing arthritic changes, or wearing of the cartilage in the knee. This can be caused by normal \"wear and tear\" over the years or following prior injury to the knee. Suspect severity of the underlying osteoarthritis has progressed since these images were obtained in 2020.  * treatment usually nonsurgical to start, then can progress to surgical with total knee arthroplasty once nonsurgical management effective.    Non-surgical treatment for knee arthritis includes:    * rest, sitting  * Activity modification - avoid impact activities or activities that aggravate symptoms.  * NSAIDS (non-steroidal anti-inflammatory medications; e.g. Aleve, advil, motrin, ibuprofen) - regular use for inflammation ( twice daily or three times daily), with food, as long as no contra-indications Please discuss with primary care doctor if needed  * ice, 15-20 minutes at a time several times a day or as needed.  * Strengthening of quadriceps muscles  * Physical Therapy for strengthening, stretching and range of motion exercises of legs  * Tylenol as needed for pain, consider Tylenol arthritis or similar  * Weight loss: Weight loss:  Body mass index is 31.4 kg/m .. weight loss benefits, not only for the current pain symptoms, but also overall health. Recommend a good diet plan that works for the patient, with the assistance of a dietician or primary care doctor as needed. Also, a good, low-impact exercise program for at least 20 minutes per day, 3 times per week, such as exercise bike, elliptical , or pool.  * Exercise: low impact such as stationary bike, elliptical, pool.  * Injections: cortisone versus viscosupplementation (hyaluronic acid, \"rooster comb\", \"gel shots\"); risks and perceived benefits discussed today. Patient elects to proceed.  * Bracing: bracing the knee may offer some relief of symptoms when worn and provide some stability.  * over the counter supplements such as glucosamine and chondroitin sulfate may help with " joint pain.  * topical ointments may help as well    * return to clinic as needed. Consider updated images in the future as needed.      Flash Boggs M.D., M.S.  Dept. of Orthopaedic Surgery  [facility]    Large Joint Injection/Arthocentesis: R knee joint    Date/Time: 4/10/2025 3:53 PM    Performed by: Flash Boggs MD  Authorized by: Flash Boggs MD    Indications:  Pain  Needle Size:  22 G  Guidance: landmark guided    Approach:  Anteromedial  Location:  Knee      Medications:  80 mg methylPREDNISolone 80 MG/ML; 4 mL BUPivacaine 0.25 %  Outcome:  Tolerated well, no immediate complications  Procedure discussed: discussed risks, benefits, and alternatives    Consent Given by:  Patient  Timeout: timeout called immediately prior to procedure    Prep: patient was prepped and draped in usual sterile fashion          Again, thank you for allowing me to participate in the care of your patient.        Sincerely,        Flash Boggs MD    Electronically signed

## 2025-07-09 DIAGNOSIS — N52.03 COMBINED ARTERIAL INSUFFICIENCY AND CORPORO-VENOUS OCCLUSIVE ERECTILE DYSFUNCTION: ICD-10-CM

## 2025-07-10 RX ORDER — TADALAFIL 5 MG/1
5 TABLET ORAL DAILY
Qty: 30 TABLET | Refills: 0 | Status: SHIPPED | OUTPATIENT
Start: 2025-07-10

## 2025-08-28 ENCOUNTER — OFFICE VISIT (OUTPATIENT)
Dept: ORTHOPEDICS | Facility: CLINIC | Age: 65
End: 2025-08-28
Payer: COMMERCIAL

## 2025-08-28 VITALS — BODY MASS INDEX: 31.42 KG/M2 | WEIGHT: 237.1 LBS | HEIGHT: 73 IN

## 2025-08-28 DIAGNOSIS — M17.11 PRIMARY OSTEOARTHRITIS OF RIGHT KNEE: Primary | ICD-10-CM

## 2025-08-28 RX ORDER — METHYLPREDNISOLONE ACETATE 80 MG/ML
80 INJECTION, SUSPENSION INTRA-ARTICULAR; INTRALESIONAL; INTRAMUSCULAR; SOFT TISSUE
Status: COMPLETED | OUTPATIENT
Start: 2025-08-28 | End: 2025-08-28

## 2025-08-28 RX ORDER — BUPIVACAINE HYDROCHLORIDE 2.5 MG/ML
4 INJECTION, SOLUTION INFILTRATION; PERINEURAL
Status: COMPLETED | OUTPATIENT
Start: 2025-08-28 | End: 2025-08-28

## 2025-08-28 RX ADMIN — BUPIVACAINE HYDROCHLORIDE 4 ML: 2.5 INJECTION, SOLUTION INFILTRATION; PERINEURAL at 13:59

## 2025-08-28 RX ADMIN — METHYLPREDNISOLONE ACETATE 80 MG: 80 INJECTION, SUSPENSION INTRA-ARTICULAR; INTRALESIONAL; INTRAMUSCULAR; SOFT TISSUE at 13:59

## 2025-08-28 ASSESSMENT — PAIN SCALES - GENERAL: PAINLEVEL_OUTOF10: SEVERE PAIN (7)

## (undated) DEVICE — SU VICRYL 3-0 SH 27" UND J416H

## (undated) DEVICE — Device

## (undated) DEVICE — SOL WATER IRRIG 1000ML BOTTLE 07139-09

## (undated) DEVICE — DEVICE ABSORBABLE TACK 5MM SINGLE ABSTACK30

## (undated) DEVICE — GLOVE PROTEXIS W/NEU-THERA 7.5  2D73TE75

## (undated) DEVICE — SU MONOCRYL 4-0 PS-2 18" UND Y496G

## (undated) DEVICE — ENDO TROCAR SLEEVE KII ADV FIXATION 05X100MM CFS02

## (undated) DEVICE — DECANTER VIAL 2006S

## (undated) DEVICE — NDL 22GA 1.5"

## (undated) DEVICE — SU VICRYL 0 UR-6 27" J603H

## (undated) DEVICE — SU ENDO POLYSORB 0 3" LOOP 21" EL-21-L

## (undated) DEVICE — PREP CHLORAPREP 26ML TINTED ORANGE  260815

## (undated) DEVICE — GOWN XLG DISP 9545

## (undated) DEVICE — PACK LAPAROTOMY CUSTOM LAKES

## (undated) DEVICE — ESU ENDO SCISSORS 5MM CVD 5DCS

## (undated) DEVICE — BLADE CLIPPER 4406

## (undated) DEVICE — ESU PENCIL W/COATED BLADE E2450H

## (undated) DEVICE — ENDO TROCAR FIRST ENTRY KII FIOS ADV FIX 12X100MM CFF73

## (undated) DEVICE — GLOVE PROTEXIS BLUE W/NEU-THERA 8.0  2D73EB80

## (undated) DEVICE — STOCKING SLEEVE COMPRESSION CALF MED

## (undated) DEVICE — ADH SKIN CLOSURE PREMIERPRO EXOFIN 1.0ML 3470

## (undated) DEVICE — SOL NACL 0.9% IRRIG 1000ML BOTTLE 07138-09

## (undated) DEVICE — ENDO TROCAR FIRST ENTRY KII FIOS ADV FIX 05X100MM CFF03

## (undated) RX ORDER — FENTANYL CITRATE 50 UG/ML
INJECTION, SOLUTION INTRAMUSCULAR; INTRAVENOUS
Status: DISPENSED
Start: 2021-11-08

## (undated) RX ORDER — LIDOCAINE HYDROCHLORIDE 20 MG/ML
JELLY TOPICAL
Status: DISPENSED
Start: 2021-11-08

## (undated) RX ORDER — CEFAZOLIN SODIUM 2 G/100ML
INJECTION, SOLUTION INTRAVENOUS
Status: DISPENSED
Start: 2021-11-08

## (undated) RX ORDER — ONDANSETRON 2 MG/ML
INJECTION INTRAMUSCULAR; INTRAVENOUS
Status: DISPENSED
Start: 2021-11-08

## (undated) RX ORDER — BUPIVACAINE HYDROCHLORIDE 5 MG/ML
INJECTION, SOLUTION PERINEURAL
Status: DISPENSED
Start: 2021-11-08

## (undated) RX ORDER — DEXAMETHASONE SODIUM PHOSPHATE 4 MG/ML
INJECTION, SOLUTION INTRA-ARTICULAR; INTRALESIONAL; INTRAMUSCULAR; INTRAVENOUS; SOFT TISSUE
Status: DISPENSED
Start: 2021-11-08

## (undated) RX ORDER — GLYCOPYRROLATE 0.2 MG/ML
INJECTION, SOLUTION INTRAMUSCULAR; INTRAVENOUS
Status: DISPENSED
Start: 2021-11-08

## (undated) RX ORDER — KETOROLAC TROMETHAMINE 30 MG/ML
INJECTION, SOLUTION INTRAMUSCULAR; INTRAVENOUS
Status: DISPENSED
Start: 2021-11-08

## (undated) RX ORDER — ACETAMINOPHEN 325 MG/1
TABLET ORAL
Status: DISPENSED
Start: 2021-11-08

## (undated) RX ORDER — MAGNESIUM SULFATE HEPTAHYDRATE 40 MG/ML
INJECTION, SOLUTION INTRAVENOUS
Status: DISPENSED
Start: 2021-11-08

## (undated) RX ORDER — LIDOCAINE HYDROCHLORIDE 10 MG/ML
INJECTION, SOLUTION EPIDURAL; INFILTRATION; INTRACAUDAL; PERINEURAL
Status: DISPENSED
Start: 2021-11-08

## (undated) RX ORDER — LIDOCAINE HYDROCHLORIDE AND EPINEPHRINE 10; 10 MG/ML; UG/ML
INJECTION, SOLUTION INFILTRATION; PERINEURAL
Status: DISPENSED
Start: 2021-11-08

## (undated) RX ORDER — PROPOFOL 10 MG/ML
INJECTION, EMULSION INTRAVENOUS
Status: DISPENSED
Start: 2021-11-08

## (undated) RX ORDER — GABAPENTIN 300 MG/1
CAPSULE ORAL
Status: DISPENSED
Start: 2021-11-08